# Patient Record
Sex: FEMALE | Race: WHITE | Employment: OTHER | URBAN - METROPOLITAN AREA
[De-identification: names, ages, dates, MRNs, and addresses within clinical notes are randomized per-mention and may not be internally consistent; named-entity substitution may affect disease eponyms.]

---

## 2020-07-19 ENCOUNTER — HOSPITAL ENCOUNTER (INPATIENT)
Age: 85
LOS: 2 days | Discharge: HOME HEALTH | DRG: 291 | End: 2020-07-24
Attending: EMERGENCY MEDICINE | Admitting: HOSPITALIST
Payer: MEDICARE

## 2020-07-19 ENCOUNTER — APPOINTMENT (OUTPATIENT)
Dept: GENERAL RADIOLOGY | Age: 85
DRG: 291 | End: 2020-07-19
Attending: EMERGENCY MEDICINE
Payer: MEDICARE

## 2020-07-19 DIAGNOSIS — I50.21 ACUTE SYSTOLIC CONGESTIVE HEART FAILURE (HCC): Primary | ICD-10-CM

## 2020-07-19 PROBLEM — I50.9 CHF (CONGESTIVE HEART FAILURE) (HCC): Status: ACTIVE | Noted: 2020-07-19

## 2020-07-19 LAB
ALBUMIN SERPL-MCNC: 3.2 G/DL (ref 3.5–5)
ALBUMIN/GLOB SERPL: 0.9 {RATIO} (ref 1.1–2.2)
ALP SERPL-CCNC: 53 U/L (ref 45–117)
ALT SERPL-CCNC: 30 U/L (ref 12–78)
ANION GAP SERPL CALC-SCNC: 7 MMOL/L (ref 5–15)
APPEARANCE UR: CLEAR
APTT PPP: 25.2 SEC (ref 22.1–32)
AST SERPL-CCNC: 31 U/L (ref 15–37)
BACTERIA URNS QL MICRO: NEGATIVE /HPF
BASOPHILS # BLD: 0.1 K/UL (ref 0–0.1)
BASOPHILS NFR BLD: 1 % (ref 0–1)
BILIRUB SERPL-MCNC: 0.7 MG/DL (ref 0.2–1)
BILIRUB UR QL: NEGATIVE
BNP SERPL-MCNC: 2170 PG/ML (ref 0–450)
BUN SERPL-MCNC: 11 MG/DL (ref 6–20)
BUN/CREAT SERPL: 11 (ref 12–20)
CALCIUM SERPL-MCNC: 8.9 MG/DL (ref 8.5–10.1)
CHLORIDE SERPL-SCNC: 101 MMOL/L (ref 97–108)
CO2 SERPL-SCNC: 33 MMOL/L (ref 21–32)
COLOR UR: ABNORMAL
CREAT SERPL-MCNC: 0.98 MG/DL (ref 0.55–1.02)
DIFFERENTIAL METHOD BLD: ABNORMAL
EOSINOPHIL # BLD: 0.5 K/UL (ref 0–0.4)
EOSINOPHIL NFR BLD: 6 % (ref 0–7)
EPITH CASTS URNS QL MICRO: ABNORMAL /LPF
ERYTHROCYTE [DISTWIDTH] IN BLOOD BY AUTOMATED COUNT: 13.5 % (ref 11.5–14.5)
GLOBULIN SER CALC-MCNC: 3.5 G/DL (ref 2–4)
GLUCOSE SERPL-MCNC: 126 MG/DL (ref 65–100)
GLUCOSE UR STRIP.AUTO-MCNC: NEGATIVE MG/DL
HCT VFR BLD AUTO: 37.8 % (ref 35–47)
HGB BLD-MCNC: 12.1 G/DL (ref 11.5–16)
HGB UR QL STRIP: NEGATIVE
IMM GRANULOCYTES # BLD AUTO: 0 K/UL (ref 0–0.04)
IMM GRANULOCYTES NFR BLD AUTO: 0 % (ref 0–0.5)
INR PPP: 1.3 (ref 0.9–1.1)
KETONES UR QL STRIP.AUTO: NEGATIVE MG/DL
LEUKOCYTE ESTERASE UR QL STRIP.AUTO: ABNORMAL
LYMPHOCYTES # BLD: 3 K/UL (ref 0.8–3.5)
LYMPHOCYTES NFR BLD: 36 % (ref 12–49)
MCH RBC QN AUTO: 30.1 PG (ref 26–34)
MCHC RBC AUTO-ENTMCNC: 32 G/DL (ref 30–36.5)
MCV RBC AUTO: 94 FL (ref 80–99)
MONOCYTES # BLD: 0.9 K/UL (ref 0–1)
MONOCYTES NFR BLD: 11 % (ref 5–13)
NEUTS SEG # BLD: 3.8 K/UL (ref 1.8–8)
NEUTS SEG NFR BLD: 46 % (ref 32–75)
NITRITE UR QL STRIP.AUTO: NEGATIVE
NRBC # BLD: 0 K/UL (ref 0–0.01)
NRBC BLD-RTO: 0 PER 100 WBC
PH UR STRIP: 6 [PH] (ref 5–8)
PLATELET # BLD AUTO: 180 K/UL (ref 150–400)
PMV BLD AUTO: 11.6 FL (ref 8.9–12.9)
POTASSIUM SERPL-SCNC: 3.3 MMOL/L (ref 3.5–5.1)
PROT SERPL-MCNC: 6.7 G/DL (ref 6.4–8.2)
PROT UR STRIP-MCNC: NEGATIVE MG/DL
PROTHROMBIN TIME: 12.6 SEC (ref 9–11.1)
RBC # BLD AUTO: 4.02 M/UL (ref 3.8–5.2)
RBC #/AREA URNS HPF: ABNORMAL /HPF (ref 0–5)
RBC MORPH BLD: ABNORMAL
SODIUM SERPL-SCNC: 141 MMOL/L (ref 136–145)
SP GR UR REFRACTOMETRY: 1.01 (ref 1–1.03)
THERAPEUTIC RANGE,PTTT: NORMAL SECS (ref 58–77)
TROPONIN I SERPL-MCNC: <0.05 NG/ML
UA: UC IF INDICATED,UAUC: ABNORMAL
UROBILINOGEN UR QL STRIP.AUTO: 1 EU/DL (ref 0.2–1)
WBC # BLD AUTO: 8.3 K/UL (ref 3.6–11)
WBC URNS QL MICRO: ABNORMAL /HPF (ref 0–4)

## 2020-07-19 PROCEDURE — 85730 THROMBOPLASTIN TIME PARTIAL: CPT

## 2020-07-19 PROCEDURE — 83880 ASSAY OF NATRIURETIC PEPTIDE: CPT

## 2020-07-19 PROCEDURE — 84484 ASSAY OF TROPONIN QUANT: CPT

## 2020-07-19 PROCEDURE — 99218 HC RM OBSERVATION: CPT

## 2020-07-19 PROCEDURE — 74011250636 HC RX REV CODE- 250/636: Performed by: EMERGENCY MEDICINE

## 2020-07-19 PROCEDURE — 87635 SARS-COV-2 COVID-19 AMP PRB: CPT

## 2020-07-19 PROCEDURE — 80053 COMPREHEN METABOLIC PANEL: CPT

## 2020-07-19 PROCEDURE — 85025 COMPLETE CBC W/AUTO DIFF WBC: CPT

## 2020-07-19 PROCEDURE — 93005 ELECTROCARDIOGRAM TRACING: CPT

## 2020-07-19 PROCEDURE — 85610 PROTHROMBIN TIME: CPT

## 2020-07-19 PROCEDURE — 71045 X-RAY EXAM CHEST 1 VIEW: CPT

## 2020-07-19 PROCEDURE — 96374 THER/PROPH/DIAG INJ IV PUSH: CPT

## 2020-07-19 PROCEDURE — 36415 COLL VENOUS BLD VENIPUNCTURE: CPT

## 2020-07-19 PROCEDURE — 81001 URINALYSIS AUTO W/SCOPE: CPT

## 2020-07-19 PROCEDURE — 99285 EMERGENCY DEPT VISIT HI MDM: CPT

## 2020-07-19 RX ORDER — FUROSEMIDE 10 MG/ML
40 INJECTION INTRAMUSCULAR; INTRAVENOUS
Status: COMPLETED | OUTPATIENT
Start: 2020-07-19 | End: 2020-07-19

## 2020-07-19 RX ORDER — FUROSEMIDE 40 MG/1
20 TABLET ORAL DAILY
COMMUNITY
Start: 2020-08-01 | End: 2020-08-17

## 2020-07-19 RX ORDER — IRBESARTAN 300 MG/1
150 TABLET ORAL DAILY
COMMUNITY
Start: 2020-08-01

## 2020-07-19 RX ORDER — CARVEDILOL 12.5 MG/1
3.24 TABLET ORAL 2 TIMES DAILY
COMMUNITY
Start: 2020-08-01

## 2020-07-19 RX ADMIN — FUROSEMIDE 40 MG: 10 INJECTION, SOLUTION INTRAMUSCULAR; INTRAVENOUS at 21:00

## 2020-07-19 NOTE — ED NOTES
Patient assisted up to bedside commode. Patient became dyspneic when getting back in bed spO2 88-90%. Patient reminded to try and take deep breaths. SpO2 keke to 94% within a minute of being back in bed. Daughter is concerned. MD notified.

## 2020-07-19 NOTE — ED PROVIDER NOTES
HPI   The patient is a 71-year-old white female who had been in independent living facility in [place] which had no [place] cases and was picked up by her daughter 5 days ago and driven back from [place] to live with her daughter. Since her daughter got her she seems to be not as strong as usual according to her. She had been admitted 2 months ago to the hospital when she fell and was found to have an infiltrate on chest x-ray. She has a mild cough but no significant production. She has a history of atrial fibrillation and congestive heart failure hypertension and a TIA in the past.  She has no new focal neurologic deficits. She was checked twice for COVID and was negative in [place]. She recently had her Lasix doubled by her PCP. Past Medical History:   Diagnosis Date    A-fib [facility])     CHF (congestive heart failure) (HCC)     EF of 55-60%    Hypertension     TIA (transient ischemic attack)        Past Surgical History:   Procedure Laterality Date    HX CATARACT REMOVAL      HX TONSILLECTOMY           History reviewed. No pertinent family history.     Social History     Socioeconomic History    Marital status: Not on file     Spouse name: Not on file    Number of children: Not on file    Years of education: Not on file    Highest education level: Not on file   Occupational History    Not on file   Social Needs    Financial resource strain: Not on file    Food insecurity     Worry: Not on file     Inability: Not on file    Transportation needs     Medical: Not on file     Non-medical: Not on file   Tobacco Use    Smoking status: Former Smoker     Years: 30.00    Smokeless tobacco: Never Used   Substance and Sexual Activity    Alcohol use: Not Currently    Drug use: Not on file    Sexual activity: Not on file   Lifestyle    Physical activity     Days per week: Not on file     Minutes per session: Not on file    Stress: Not on file   Relationships    Social connections     Talks on phone: Not on file     Gets together: Not on file     Attends Scientologist service: Not on file     Active member of club or organization: Not on file     Attends meetings of clubs or organizations: Not on file     Relationship status: Not on file    Intimate partner violence     Fear of current or ex partner: Not on file     Emotionally abused: Not on file     Physically abused: Not on file     Forced sexual activity: Not on file   Other Topics Concern    Not on file   Social History Narrative    Not on file         ALLERGIES: Pcn [penicillins]    Review of Systems   All other systems reviewed and are negative. Vitals:    07/19/20 1741   BP: (!) 149/92   Pulse: 82   Resp: 21   Temp: 98.5 °F (36.9 °C)   SpO2: 92%   Weight: 63.6 kg (140 lb 3.4 oz)   Height: 5' 3\" (1.6 m)            Physical Exam  Vitals signs and nursing note reviewed. Constitutional:       Appearance: She is well-developed. Comments: Elderly chronically ill debilitated rales bilateral in the bases   HENT:      Head: Normocephalic and atraumatic. Mouth/Throat:      Pharynx: No oropharyngeal exudate. Eyes:      General: No scleral icterus. Conjunctiva/sclera: Conjunctivae normal.   Neck:      Musculoskeletal: Neck supple. Thyroid: No thyromegaly. Cardiovascular:      Rate and Rhythm: Normal rate and regular rhythm. Heart sounds: Normal heart sounds. No murmur. No friction rub. No gallop. Pulmonary:      Effort: Pulmonary effort is normal. No respiratory distress. Breath sounds: Normal breath sounds. No stridor. No wheezing or rales. Abdominal:      General: Bowel sounds are normal.      Palpations: Abdomen is soft. Tenderness: There is no abdominal tenderness. There is no guarding or rebound. Musculoskeletal: Normal range of motion. Lymphadenopathy:      Cervical: No cervical adenopathy. Skin:     General: Skin is warm and dry.    Neurological:      Mental Status: She is alert and oriented to person, place, and time.          MDM       Procedures

## 2020-07-19 NOTE — ED TRIAGE NOTES
Triage Note: Patient arrives to ER complaining of lightheadedness and nausea. Recently had pneumonia 2 months ago. On Friday she was rediagnosed with pneumonia again and started on z-pack. Daughter states she has been more tired since Friday, worse in the morning. Daughter states she had an episode of low BP this morning. She is concern that the patient need IV antibiotics. Denies fever. +productive cough. Patient just arrived from [place] Wednesday. no

## 2020-07-20 LAB
ANION GAP SERPL CALC-SCNC: 7 MMOL/L (ref 5–15)
ATRIAL RATE: 78 BPM
BASOPHILS # BLD: 0.1 K/UL (ref 0–0.1)
BASOPHILS NFR BLD: 1 % (ref 0–1)
BNP SERPL-MCNC: 2120 PG/ML
BUN SERPL-MCNC: 9 MG/DL (ref 6–20)
BUN/CREAT SERPL: 14 (ref 12–20)
CALCIUM SERPL-MCNC: 8.5 MG/DL (ref 8.5–10.1)
CALCULATED R AXIS, ECG10: 78 DEGREES
CALCULATED T AXIS, ECG11: 79 DEGREES
CHLORIDE SERPL-SCNC: 100 MMOL/L (ref 97–108)
CO2 SERPL-SCNC: 33 MMOL/L (ref 21–32)
CREAT SERPL-MCNC: 0.64 MG/DL (ref 0.55–1.02)
DIAGNOSIS, 93000: NORMAL
DIFFERENTIAL METHOD BLD: ABNORMAL
EOSINOPHIL # BLD: 0.6 K/UL (ref 0–0.4)
EOSINOPHIL NFR BLD: 7 % (ref 0–7)
ERYTHROCYTE [DISTWIDTH] IN BLOOD BY AUTOMATED COUNT: 14 % (ref 11.5–14.5)
GLUCOSE SERPL-MCNC: 93 MG/DL (ref 65–100)
HCT VFR BLD AUTO: 40.7 % (ref 35–47)
HGB BLD-MCNC: 12.3 G/DL (ref 11.5–16)
IMM GRANULOCYTES # BLD AUTO: 0 K/UL (ref 0–0.04)
IMM GRANULOCYTES NFR BLD AUTO: 0 % (ref 0–0.5)
LYMPHOCYTES # BLD: 3 K/UL (ref 0.8–3.5)
LYMPHOCYTES NFR BLD: 36 % (ref 12–49)
MAGNESIUM SERPL-MCNC: 2 MG/DL (ref 1.6–2.4)
MCH RBC QN AUTO: 30.5 PG (ref 26–34)
MCHC RBC AUTO-ENTMCNC: 30.2 G/DL (ref 30–36.5)
MCV RBC AUTO: 101 FL (ref 80–99)
MONOCYTES # BLD: 0.9 K/UL (ref 0–1)
MONOCYTES NFR BLD: 11 % (ref 5–13)
NEUTS SEG # BLD: 3.7 K/UL (ref 1.8–8)
NEUTS SEG NFR BLD: 45 % (ref 32–75)
NRBC # BLD: 0 K/UL (ref 0–0.01)
NRBC BLD-RTO: 0 PER 100 WBC
PLATELET # BLD AUTO: 147 K/UL (ref 150–400)
PMV BLD AUTO: 12.8 FL (ref 8.9–12.9)
POTASSIUM SERPL-SCNC: 3.2 MMOL/L (ref 3.5–5.1)
Q-T INTERVAL, ECG07: 440 MS
QRS DURATION, ECG06: 82 MS
QTC CALCULATION (BEZET), ECG08: 478 MS
RBC # BLD AUTO: 4.03 M/UL (ref 3.8–5.2)
RBC MORPH BLD: ABNORMAL
SARS-COV-2, COV2: NOT DETECTED
SODIUM SERPL-SCNC: 140 MMOL/L (ref 136–145)
SOURCE, COVRS: NORMAL
SPECIMEN SOURCE, FCOV2M: NORMAL
VENTRICULAR RATE, ECG03: 71 BPM
WBC # BLD AUTO: 8.3 K/UL (ref 3.6–11)

## 2020-07-20 PROCEDURE — 97530 THERAPEUTIC ACTIVITIES: CPT

## 2020-07-20 PROCEDURE — 74011250637 HC RX REV CODE- 250/637: Performed by: INTERNAL MEDICINE

## 2020-07-20 PROCEDURE — 85025 COMPLETE CBC W/AUTO DIFF WBC: CPT

## 2020-07-20 PROCEDURE — 97535 SELF CARE MNGMENT TRAINING: CPT

## 2020-07-20 PROCEDURE — 99218 HC RM OBSERVATION: CPT

## 2020-07-20 PROCEDURE — 74011250637 HC RX REV CODE- 250/637: Performed by: HOSPITALIST

## 2020-07-20 PROCEDURE — 74011250636 HC RX REV CODE- 250/636: Performed by: INTERNAL MEDICINE

## 2020-07-20 PROCEDURE — 97116 GAIT TRAINING THERAPY: CPT

## 2020-07-20 PROCEDURE — 77010033678 HC OXYGEN DAILY

## 2020-07-20 PROCEDURE — 96376 TX/PRO/DX INJ SAME DRUG ADON: CPT

## 2020-07-20 PROCEDURE — 94760 N-INVAS EAR/PLS OXIMETRY 1: CPT

## 2020-07-20 PROCEDURE — 97166 OT EVAL MOD COMPLEX 45 MIN: CPT

## 2020-07-20 PROCEDURE — 97161 PT EVAL LOW COMPLEX 20 MIN: CPT

## 2020-07-20 PROCEDURE — 83880 ASSAY OF NATRIURETIC PEPTIDE: CPT

## 2020-07-20 PROCEDURE — 36415 COLL VENOUS BLD VENIPUNCTURE: CPT

## 2020-07-20 PROCEDURE — 83735 ASSAY OF MAGNESIUM: CPT

## 2020-07-20 PROCEDURE — 80048 BASIC METABOLIC PNL TOTAL CA: CPT

## 2020-07-20 RX ORDER — CARVEDILOL 12.5 MG/1
12.5 TABLET ORAL 2 TIMES DAILY
Status: DISCONTINUED | OUTPATIENT
Start: 2020-07-20 | End: 2020-07-24 | Stop reason: HOSPADM

## 2020-07-20 RX ORDER — POTASSIUM CHLORIDE 750 MG/1
40 TABLET, FILM COATED, EXTENDED RELEASE ORAL DAILY
Status: COMPLETED | OUTPATIENT
Start: 2020-07-20 | End: 2020-07-22

## 2020-07-20 RX ORDER — LOSARTAN POTASSIUM 50 MG/1
100 TABLET ORAL DAILY
Status: DISCONTINUED | OUTPATIENT
Start: 2020-07-20 | End: 2020-07-24 | Stop reason: HOSPADM

## 2020-07-20 RX ORDER — FUROSEMIDE 10 MG/ML
40 INJECTION INTRAMUSCULAR; INTRAVENOUS ONCE
Status: COMPLETED | OUTPATIENT
Start: 2020-07-20 | End: 2020-07-20

## 2020-07-20 RX ORDER — FUROSEMIDE 10 MG/ML
40 INJECTION INTRAMUSCULAR; INTRAVENOUS 2 TIMES DAILY
Status: DISCONTINUED | OUTPATIENT
Start: 2020-07-20 | End: 2020-07-21

## 2020-07-20 RX ORDER — HYDRALAZINE HYDROCHLORIDE 20 MG/ML
10 INJECTION INTRAMUSCULAR; INTRAVENOUS
Status: DISCONTINUED | OUTPATIENT
Start: 2020-07-20 | End: 2020-07-24 | Stop reason: HOSPADM

## 2020-07-20 RX ORDER — SPIRONOLACTONE 25 MG/1
25 TABLET ORAL DAILY
Status: DISCONTINUED | OUTPATIENT
Start: 2020-07-20 | End: 2020-07-24 | Stop reason: HOSPADM

## 2020-07-20 RX ORDER — POTASSIUM CHLORIDE 750 MG/1
40 TABLET, FILM COATED, EXTENDED RELEASE ORAL
Status: COMPLETED | OUTPATIENT
Start: 2020-07-20 | End: 2020-07-20

## 2020-07-20 RX ADMIN — POTASSIUM CHLORIDE 40 MEQ: 750 TABLET, FILM COATED, EXTENDED RELEASE ORAL at 06:00

## 2020-07-20 RX ADMIN — CARVEDILOL 12.5 MG: 12.5 TABLET, FILM COATED ORAL at 09:00

## 2020-07-20 RX ADMIN — LOSARTAN POTASSIUM 100 MG: 50 TABLET, FILM COATED ORAL at 09:00

## 2020-07-20 RX ADMIN — POTASSIUM CHLORIDE 40 MEQ: 750 TABLET, FILM COATED, EXTENDED RELEASE ORAL at 13:23

## 2020-07-20 RX ADMIN — CARVEDILOL 12.5 MG: 12.5 TABLET, FILM COATED ORAL at 17:07

## 2020-07-20 RX ADMIN — FUROSEMIDE 40 MG: 10 INJECTION, SOLUTION INTRAMUSCULAR; INTRAVENOUS at 17:07

## 2020-07-20 RX ADMIN — RIVAROXABAN 15 MG: 15 TABLET, FILM COATED ORAL at 09:00

## 2020-07-20 RX ADMIN — SPIRONOLACTONE 25 MG: 25 TABLET ORAL at 10:00

## 2020-07-20 RX ADMIN — FUROSEMIDE 40 MG: 10 INJECTION, SOLUTION INTRAMUSCULAR; INTRAVENOUS at 09:00

## 2020-07-20 NOTE — CARDIO/PULMONARY
Cardiac Rehab: Chart review for Michelle Silva based on Cardiac Rehab consult. Michelle Silva with history of DHF and significant dementia. DHF not covered by insurance for OP program and dementia would be an issue with safety concerns as well. Deanne Castillo RN

## 2020-07-20 NOTE — H&P
[address] [facility] Adult  Hospitalist Group  History and Physical    Primary Care Provider: None  Date of Service:  7/20/2020    Subjective:     Tabitha Patrick is a 80 y.o. female with atrial fibrillation, congestive heart failure, hypertension presented emergency room for evaluation of shortness of breath. Patient has significant dementia and is a poor historian unable to provide any history. She denies any complaints at this time. History is provided by her daughter over the phone. Daughter states that several weeks ago patient fell and went to the ER in [place]. At that time she was thought that she had a pneumonia and was sent home with antibiotic therapy. Despite compliance with medication patient still was feeling short of breath and noted to be dyspneic on exertion. She was seen by her primary care who recommended to increase her dose of Lasix from 20-40 as for suspected to be mostly secondary to CHF. Patient was also seen by her cardiologist at [place] about a week ago also recommended to increase the dose of carvedilol but patient has not done it. Family decided to bring the patient from [place] to [place] last Sunday as they feel that she was not getting appropriate care at the independent facility she was she was living. Daughter felt like in the last few days patient has been more short of breath having shallow breathing and this on exertion. Patient has not complained of any chest pain and she has not noted any fever. Daughter has been checking the patient's blood pressures at home which as per report has been noted in the morning but mostly elevated in the afternoon around the 432 systolic for which she has not increased the dose of Coreg. In the emergency room today patient had a chest x-ray that was suggestive of CHF/pulmonary edema. Also she had an elevated proBNP of 2000. She was given a dose of Lasix 40 mg IV. Patient resting comfortable at this time. Still requiring 2 L of oxygen. She does not use oxygen at home. Review of Systems:    Review of systems not obtained due to patient factors. Past Medical History:   Diagnosis Date    A-fib [facility])     CHF (congestive heart failure) (HCC)     EF of 55-60%    Hypertension     TIA (transient ischemic attack)       Past Surgical History:   Procedure Laterality Date    HX CATARACT REMOVAL      HX TONSILLECTOMY       Prior to Admission medications    Medication Sig Start Date End Date Taking? Authorizing Provider   irbesartan (AVAPRO) 300 mg tablet Take 300 mg by mouth daily. Yes Other, MD Sharon   carvediloL (COREG) 12.5 mg tablet Take 12.5 mg by mouth two (2) times a day. Yes Other, MD Sharon   rivaroxaban (Xarelto) 15 mg tab tablet Take 15 mg by mouth daily. Yes Dixie, MD Sharon   furosemide (Lasix) 40 mg tablet Take 40 mg by mouth daily. Yes Other, MD Sharon     Allergies   Allergen Reactions    Pcn [Penicillins] Hives     In childhood        History reviewed. No pertinent family history. SOCIAL HISTORY:  Patient resides at   Patient ambulates with walker  Smoking history: never  Alcohol history: none        Objective:       Physical Exam:   Patient Vitals for the past 12 hrs:   Temp Pulse Resp BP SpO2   07/20/20 0055 97.9 °F (36.6 °C) 80 24 174/78 100 %   07/19/20 2350 97.7 °F (36.5 °C) 79 24 135/74 98 %   07/19/20 2300 98.2 °F (36.8 °C) 81 24 125/68 95 %   07/19/20 2145  88 20 (!) 137/91 98 %   07/19/20 2121     98 %   07/19/20 2100  81 23 (!) 144/95 93 %   07/19/20 2015  85 20 (!) 156/92 95 %   07/19/20 1930  81 23 150/76 93 %   07/19/20 1845  76 23 142/84 93 %   07/19/20 1800  72 27 134/72 94 %   07/19/20 1741 98.5 °F (36.9 °C) 82 21 (!) 149/92 92 %     GEN APPEARANCE: Patient resting in bed in NAD  HEENT: Conjunctiva Clear  CVS: Irregular  LUNGS: Diffuse crackles. ABD: Soft; No TTP; + Normoactive BS  EXT: 1+ pitting edema.   Skin exam: No gross lesions noted on exposed skin surfaces  MENTAL STATUS: Oriented only to person. Not oriented to place or situation. responds to commands. NEURO:  No gross motor or sensory deficits      Data Review:   Recent Results (from the past 24 hour(s))   CBC WITH AUTOMATED DIFF    Collection Time: 07/19/20  5:57 PM   Result Value Ref Range    WBC 8.3 3.6 - 11.0 K/uL    RBC 4.02 3.80 - 5.20 M/uL    HGB 12.1 11.5 - 16.0 g/dL    HCT 37.8 35.0 - 47.0 %    MCV 94.0 80.0 - 99.0 FL    MCH 30.1 26.0 - 34.0 PG    MCHC 32.0 30.0 - 36.5 g/dL    RDW 13.5 11.5 - 14.5 %    PLATELET 958 671 - 500 K/uL    MPV 11.6 8.9 - 12.9 FL    NRBC 0.0 0  WBC    ABSOLUTE NRBC 0.00 0.00 - 0.01 K/uL    NEUTROPHILS 46 32 - 75 %    LYMPHOCYTES 36 12 - 49 %    MONOCYTES 11 5 - 13 %    EOSINOPHILS 6 0 - 7 %    BASOPHILS 1 0 - 1 %    IMMATURE GRANULOCYTES 0 0.0 - 0.5 %    ABS. NEUTROPHILS 3.8 1.8 - 8.0 K/UL    ABS. LYMPHOCYTES 3.0 0.8 - 3.5 K/UL    ABS. MONOCYTES 0.9 0.0 - 1.0 K/UL    ABS. EOSINOPHILS 0.5 (H) 0.0 - 0.4 K/UL    ABS. BASOPHILS 0.1 0.0 - 0.1 K/UL    ABS. IMM. GRANS. 0.0 0.00 - 0.04 K/UL    DF SMEAR SCANNED      RBC COMMENTS NORMOCYTIC, NORMOCHROMIC     METABOLIC PANEL, COMPREHENSIVE    Collection Time: 07/19/20  5:57 PM   Result Value Ref Range    Sodium 141 136 - 145 mmol/L    Potassium 3.3 (L) 3.5 - 5.1 mmol/L    Chloride 101 97 - 108 mmol/L    CO2 33 (H) 21 - 32 mmol/L    Anion gap 7 5 - 15 mmol/L    Glucose 126 (H) 65 - 100 mg/dL    BUN 11 6 - 20 MG/DL    Creatinine 0.98 0.55 - 1.02 MG/DL    BUN/Creatinine ratio 11 (L) 12 - 20      GFR est AA >60 >60 ml/min/1.73m2    GFR est non-AA 53 (L) >60 ml/min/1.73m2    Calcium 8.9 8.5 - 10.1 MG/DL    Bilirubin, total 0.7 0.2 - 1.0 MG/DL    ALT (SGPT) 30 12 - 78 U/L    AST (SGOT) 31 15 - 37 U/L    Alk.  phosphatase 53 45 - 117 U/L    Protein, total 6.7 6.4 - 8.2 g/dL    Albumin 3.2 (L) 3.5 - 5.0 g/dL    Globulin 3.5 2.0 - 4.0 g/dL    A-G Ratio 0.9 (L) 1.1 - 2.2     NT-PRO BNP    Collection Time: 07/19/20  5:57 PM   Result Value Ref Range    NT pro-BNP 2,170 (H) 0 - 450 PG/ML   PROTHROMBIN TIME + INR    Collection Time: 07/19/20  5:57 PM   Result Value Ref Range    INR 1.3 (H) 0.9 - 1.1      Prothrombin time 12.6 (H) 9.0 - 11.1 sec   PTT    Collection Time: 07/19/20  5:57 PM   Result Value Ref Range    aPTT 25.2 22.1 - 32.0 sec    aPTT, therapeutic range     58.0 - 77.0 SECS   TROPONIN I    Collection Time: 07/19/20  5:57 PM   Result Value Ref Range    Troponin-I, Qt. <0.05 <0.05 ng/mL   URINALYSIS W/ REFLEX CULTURE    Collection Time: 07/19/20  6:59 PM    Specimen: Urine   Result Value Ref Range    Color YELLOW/STRAW      Appearance CLEAR CLEAR      Specific gravity 1.010 1.003 - 1.030      pH (UA) 6.0 5.0 - 8.0      Protein Negative NEG mg/dL    Glucose Negative NEG mg/dL    Ketone Negative NEG mg/dL    Bilirubin Negative NEG      Blood Negative NEG      Urobilinogen 1.0 0.2 - 1.0 EU/dL    Nitrites Negative NEG      Leukocyte Esterase MODERATE (A) NEG      WBC 0-4 0 - 4 /hpf    RBC 0-5 0 - 5 /hpf    Epithelial cells MODERATE (A) FEW /lpf    Bacteria Negative NEG /hpf    UA:UC IF INDICATED CULTURE NOT INDICATED BY UA RESULT     SARS-COV-2    Collection Time: 07/19/20  8:55 PM   Result Value Ref Range    Specimen source Nasopharyngeal      SARS-CoV-2 PENDING     SARS-CoV-2 PENDING     Specimen source Nasopharyngeal      COVID-19 rapid test PENDING     COVID-19 PENDING NEG     Xr Chest Port    Result Date: 7/19/2020  IMPRESSION: Congestive failure with interstitial edema and small bilateral pleural effusions. Assessment:     Principal Problem:    CHF (congestive heart failure) ([facility] (7/19/2020)        Plan:     1. Acute hypoxic respiratory failure: H/o diastolic HF   - As per daughter report patient had an echocardiogram back in May 11, 2020 in [place] that showed ejection fraction of 60 to 65%. Patient has known diastolic dysfunction.  -We will hold on echocardiogram today and will try to obtain records.  Cardiology consult.    Repeat dose of Lasix 40 mg IV in the morning  Monitor renal function daily given diuresis  Strict in and outs   Daily weights  Wean off O2 as tolerated    2. Atrial fibrillation: Rate controlled. Will continue with Coreg. Patient on Xarelto. We will continue for now. 3. Hypertension: Elevated at this time. We will continue with home medication. 4.  Dementia: not on medications at home. DVT prophylaxis: Xarelto  CODE STATUS: Full code as per discussion with daughter  Surrogate decision maker is Onel Zhu (daughter and POA). Phone number [phone]. I have discussed case with daughter and updated her with treatment plan.       FUNCTIONAL STATUS PRIOR TO HOSPITALIZATION Ambulatory with Use of Assistive Devices    Signed By: Carolin Knutson MD     July 20, 2020

## 2020-07-20 NOTE — ROUTINE PROCESS
TRANSFER - OUT REPORT:    Verbal report given to Krzysztof Lyons RN (name) on Dede Martin  being transferred to [facility] (unit) for routine progression of care       Report consisted of patients Situation, Background, Assessment and   Recommendations(SBAR). Information from the following report(s) SBAR, ED Summary, Intake/Output, MAR and Recent Results was reviewed with the receiving nurse. Lines:   Peripheral IV 07/19/20 Right Hand (Active)   Site Assessment Clean, dry, & intact 07/19/20 1756   Phlebitis Assessment 0 07/19/20 1756   Infiltration Assessment 0 07/19/20 1756   Dressing Status Clean, dry, & intact 07/19/20 1756   Dressing Type Transparent 07/19/20 1756   Hub Color/Line Status Pink;Flushed;Patent 07/19/20 1756   Action Taken Blood drawn 07/19/20 1756        Opportunity for questions and clarification was provided. Patient transported with:   Monitor     UPDATE: Patient assessment unchanged, no changes to mental status while the ER, tolerating O2 well, VSS, no complaints at this time, waiting on transport to inpatient facility.      Visit Vitals  BP (!) 137/91 (BP 1 Location: Left arm, BP Patient Position: At rest)   Pulse 88   Temp 98.5 °F (36.9 °C)   Resp 20   Ht 5' 3\" (1.6 m)   Wt 63.6 kg (140 lb 3.4 oz)   SpO2 98%   BMI 24.84 kg/m²

## 2020-07-20 NOTE — PROGRESS NOTES
Bedside shift change report given to Nanci (oncoming nurse) by Shaniqua Tadeo (offgoing nurse). Report included the following information SBAR, Kardex, ED Summary, Intake/Output and Accordion. Request form for Medical records faxed to [facility].           Problem: Falls - Risk of  Goal: *Absence of Falls  Description: Document Rosey Sanchez Fall Risk and appropriate interventions in the flowsheet. Outcome: Progressing Towards Goal  Note: Fall Risk Interventions:  Mobility Interventions: Communicate number of staff needed for ambulation/transfer, Patient to call before getting OOB, PT Consult for assist device competence, PT Consult for mobility concerns         Medication Interventions: Evaluate medications/consider consulting pharmacy, Patient to call before getting OOB, Teach patient to arise slowly    Elimination Interventions: Call light in reach, Patient to call for help with toileting needs, Toileting schedule/hourly rounds    History of Falls Interventions: Door open when patient unattended, Evaluate medications/consider consulting pharmacy, Investigate reason for fall, Room close to nurse's station         Problem: Heart Failure: Day 2  Goal: Treatments/Interventions/Procedures  Outcome: Progressing Towards Goal      Diuresed on IV lasix. Strict I&0's. Educated on daily weights, low Na diet and I&O's.

## 2020-07-20 NOTE — CONSULTS
CARDIOLOGY CONSULT                  Subjective:    Date of  Admission: 7/19/2020  5:34 PM     Admission type:Emergency    River Guerra is a 80 y.o. female admitted for CHF (congestive heart failure) ([facility] [I50.9]. Has a known history of HFpEF, AF. She had been having worsening dyspnea, initially with exertion but then with rest. Recently had her lasix increased. She had also recently seen her cardiologist in [place] who had been making adjustments to meds. ED CXR showed pulmonary edema and labs showed an elevated BNP and a normal troponin. Received IV lasix and has had improvement. Patient Active Problem List    Diagnosis Date Noted    CHF (congestive heart failure) ([facility] 07/19/2020      None  Past Medical History:   Diagnosis Date    A-fib ([facility]     CH[facility]congestive [facility]) ([facility])     EF of 55-60%    Hypertension     TIA (transient ischemic attack)       Past Surgical History:   Procedure Laterality Date    HX CATARACT REMOVAL      HX TONSILLECTOMY       Allergies   Allergen Reactions    Pcn [Penicillins] Hives     In childhood        History reviewed. No pertinent family history.    Current Facility-Administered Medications   Medication Dose Route Frequency    carvediloL (COREG) tablet 12.5 mg  12.5 mg Oral BID    losartan (COZAAR) tablet 100 mg  100 mg Oral DAILY    hydrALAZINE (APRESOLINE) 20 mg/mL injection 10 mg  10 mg IntraVENous Q6H PRN    rivaroxaban (XARELTO) tablet 15 mg  15 mg Oral DAILY    furosemide (LASIX) injection 40 mg  40 mg IntraVENous BID    spironolactone (ALDACTONE) tablet 25 mg  25 mg Oral DAILY         Review of Symptoms:  Gen - no F/C/S  Eyes - no vision changes  ENT - no sore throat, rhinorrhea, otalgia  CV - no CP, no palpitations, no orthopnea, no PND, no NARENDRA  Resp no cough, no SOB/COREY  GI - no AP, no n/v/d/c   - no dysuria, no hematuria  MSK - no abnormal joint pains  Skin - no rashes  Neuro - no HA, no numbness, no weakness, no slurred speech  Psych - no change in mood         Physical Exam    Visit Vitals  /71   Pulse 94   Temp 97.8 °F (36.6 °C)   Resp 26   Ht 5' 3\" (1.6 m)   Wt 62.5 kg (137 lb 11.2 oz)   SpO2 94%   BMI 24.39 kg/m²     EXAM PERFORMED VIA INTOUCH DEVICE WITH ASSISTANCE OF RN STAFF    NAD  Skin warm and dry  Nl conjunctiva  Oropharynx without exudate. Neck supple  No resp distress  Abdomen soft and non tender  Pulses 2+ radials  +LLE  Neuro:  Grossly intact  Appropriate      Cardiographics    Telemetry: AFIB  ECG: atrial fibrillation    Labs:   Recent Results (from the past 24 hour(s))   EKG, 12 LEAD, INITIAL    Collection Time: 07/19/20  5:45 PM   Result Value Ref Range    Ventricular Rate 71 BPM    Atrial Rate 78 BPM    QRS Duration 82 ms    Q-T Interval 440 ms    QTC Calculation (Bezet) 478 ms    Calculated R Axis 78 degrees    Calculated T Axis 79 degrees    Diagnosis       Atrial fibrillation  Cannot rule out Anterior infarct , age undetermined  Abnormal ECG  No previous ECGs available  Confirmed by Nahid Kee MD, Danette Villalba ([id number]) on 7/20/2020 9:26:09 AM     CBC WITH AUTOMATED DIFF    Collection Time: 07/19/20  5:57 PM   Result Value Ref Range    WBC 8.3 3.6 - 11.0 K/uL    RBC 4.02 3.80 - 5.20 M/uL    HGB 12.1 11.5 - 16.0 g/dL    HCT 37.8 35.0 - 47.0 %    MCV 94.0 80.0 - 99.0 FL    MCH 30.1 26.0 - 34.0 PG    MCHC 32.0 30.0 - 36.5 g/dL    RDW 13.5 11.5 - 14.5 %    PLATELET 947 476 - 077 K/uL    MPV 11.6 8.9 - 12.9 FL    NRBC 0.0 0  WBC    ABSOLUTE NRBC 0.00 0.00 - 0.01 K/uL    NEUTROPHILS 46 32 - 75 %    LYMPHOCYTES 36 12 - 49 %    MONOCYTES 11 5 - 13 %    EOSINOPHILS 6 0 - 7 %    BASOPHILS 1 0 - 1 %    IMMATURE GRANULOCYTES 0 0.0 - 0.5 %    ABS. NEUTROPHILS 3.8 1.8 - 8.0 K/UL    ABS. LYMPHOCYTES 3.0 0.8 - 3.5 K/UL    ABS. MONOCYTES 0.9 0.0 - 1.0 K/UL    ABS. EOSINOPHILS 0.5 (H) 0.0 - 0.4 K/UL    ABS. BASOPHILS 0.1 0.0 - 0.1 K/UL    ABS. IMM.  GRANS. 0.0 0.00 - 0.04 K/UL    DF SMEAR SCANNED      RBC COMMENTS NORMOCYTIC, NORMOCHROMIC METABOLIC PANEL, COMPREHENSIVE    Collection Time: 07/19/20  5:57 PM   Result Value Ref Range    Sodium 141 136 - 145 mmol/L    Potassium 3.3 (L) 3.5 - 5.1 mmol/L    Chloride 101 97 - 108 mmol/L    CO2 33 (H) 21 - 32 mmol/L    Anion gap 7 5 - 15 mmol/L    Glucose 126 (H) 65 - 100 mg/dL    BUN 11 6 - 20 MG/DL    Creatinine 0.98 0.55 - 1.02 MG/DL    BUN/Creatinine ratio 11 (L) 12 - 20      GFR est AA >60 >60 ml/min/1.73m2    GFR est non-AA 53 (L) >60 ml/min/1.73m2    Calcium 8.9 8.5 - 10.1 MG/DL    Bilirubin, total 0.7 0.2 - 1.0 MG/DL    ALT (SGPT) 30 12 - 78 U/L    AST (SGOT) 31 15 - 37 U/L    Alk.  phosphatase 53 45 - 117 U/L    Protein, total 6.7 6.4 - 8.2 g/dL    Albumin 3.2 (L) 3.5 - 5.0 g/dL    Globulin 3.5 2.0 - 4.0 g/dL    A-G Ratio 0.9 (L) 1.1 - 2.2     NT-PRO BNP    Collection Time: 07/19/20  5:57 PM   Result Value Ref Range    NT pro-BNP 2,170 (H) 0 - 450 PG/ML   PROTHROMBIN TIME + INR    Collection Time: 07/19/20  5:57 PM   Result Value Ref Range    INR 1.3 (H) 0.9 - 1.1      Prothrombin time 12.6 (H) 9.0 - 11.1 sec   PTT    Collection Time: 07/19/20  5:57 PM   Result Value Ref Range    aPTT 25.2 22.1 - 32.0 sec    aPTT, therapeutic range     58.0 - 77.0 SECS   TROPONIN I    Collection Time: 07/19/20  5:57 PM   Result Value Ref Range    Troponin-I, Qt. <0.05 <0.05 ng/mL   URINALYSIS W/ REFLEX CULTURE    Collection Time: 07/19/20  6:59 PM    Specimen: Urine   Result Value Ref Range    Color YELLOW/STRAW      Appearance CLEAR CLEAR      Specific gravity 1.010 1.003 - 1.030      pH (UA) 6.0 5.0 - 8.0      Protein Negative NEG mg/dL    Glucose Negative NEG mg/dL    Ketone Negative NEG mg/dL    Bilirubin Negative NEG      Blood Negative NEG      Urobilinogen 1.0 0.2 - 1.0 EU/dL    Nitrites Negative NEG      Leukocyte Esterase MODERATE (A) NEG      WBC 0-4 0 - 4 /hpf    RBC 0-5 0 - 5 /hpf    Epithelial cells MODERATE (A) FEW /lpf    Bacteria Negative NEG /hpf    UA:UC IF INDICATED CULTURE NOT INDICATED BY UA RESULT     SARS-COV-2    Collection Time: 07/19/20  8:55 PM   Result Value Ref Range    Specimen source Nasopharyngeal      SARS-CoV-2 PENDING     Specimen source Nasopharyngeal     METABOLIC PANEL, BASIC    Collection Time: 07/20/20  6:16 AM   Result Value Ref Range    Sodium 140 136 - 145 mmol/L    Potassium 3.2 (L) 3.5 - 5.1 mmol/L    Chloride 100 97 - 108 mmol/L    CO2 33 (H) 21 - 32 mmol/L    Anion gap 7 5 - 15 mmol/L    Glucose 93 65 - 100 mg/dL    BUN 9 6 - 20 MG/DL    Creatinine 0.64 0.55 - 1.02 MG/DL    BUN/Creatinine ratio 14 12 - 20      GFR est AA >60 >60 ml/min/1.73m2    GFR est non-AA >60 >60 ml/min/1.73m2    Calcium 8.5 8.5 - 10.1 MG/DL   MAGNESIUM    Collection Time: 07/20/20  6:16 AM   Result Value Ref Range    Magnesium 2.0 1.6 - 2.4 mg/dL   NT-PRO BNP    Collection Time: 07/20/20  6:16 AM   Result Value Ref Range    NT pro-BNP 2,120 (H) <450 PG/ML        Assessment and Plan:     This is a 80 yof with acute on chronic diastolic CHF    Increased lasix to BID  Daily BMP  Cont other cardiac meds  Started spironolactone  On NOAC for AF  Had recent echo which showed normal LVEF, no need to repeat    Thank you for the consult, please call with questions     Assessment:

## 2020-07-20 NOTE — ED NOTES
ZARI present in department. Given EMTALA paperwork and facesheet. Patient transferred to transport stretcher without difficulty. No acute distress at time of departure.

## 2020-07-20 NOTE — ED NOTES
RN at bedside updating and explaining admission process to daughter. IV lasix given and purwick in place. Patient requires continuous redirection on purwick due to baseline confusion. RN noticed that spO2 ranging between 89-91% on room air while resting. RN placed patient on O2 2L NC, spO2 now 98%. MD notified. Patient and daughter deny and needs at this time. VSS on monitor x 3, call bell within reach will continue reassessing as needed.

## 2020-07-20 NOTE — PROGRESS NOTES
[facility] Adult  Hospitalist Group                                                                                          Hospitalist Progress Note  Ezio Paredes MD  Answering service: [phone] OR [phone] from in house phone        Date of Service:  2020  NAME:  Tera Gilman  :  1923  MRN:  [id number]    This documentation was facilitated by a Voice Recognition software and may contain inadvertent typographical errors. Admission Summary:   Patient was brought to the ED due to worsening dyspnea. She was diagnosed with pneumonia last Friday and is on antibiotics. She was also treated for presumed pneumonia several weeks ago in [place]. Patient lives in [place] was brought to live with daughter last week. Her significant medical history includes atrial fibrillation congestive heart failure. With her ongoing symptoms of dyspnea and shortness of breath, her doctors (PCP and cardiologist) were increasing her diuretic and carvedilol. Interval history / Subjective:   Ms. Carter Rollins is doing well. She is lying in the bed comfortably. Weaing oxygen via nasal canula 1-2 l/min. Assessment & Plan:    Acute hypoxic respiratory failure likely due to CHF, undetermined if systolic or diastolic, NYHA class IV  -Responded to diuretics, continue Lasix  - As per daughter report patient had an echocardiogram back in May 11, 2020 in [place] that showed ejection fraction of 60 to 65%. Patient has known diastolic dysfunction.  -If records are not available, we may need to obtain echocardiogram once COVID is ruled out  Monitor renal function daily given diuresis  Strict in and outs   Daily weights  Wean off O2 as tolerated  -Cardiology consult by admitting team  -SARS-CoV-2 result pending           Chronic atrial fibrillation: Rate controlled. -Continue beta-blocker carvedilol  -Continue anticoagulation with Xarelto.         Hypertension: Continue carvedilol     Dementia: not on medications at home.   Patient is alert and oriented. If SARS-CoV-2  test is negative, can transfer to non-COVID  telemetry unit     DVT prophylaxis: Xarelto  CODE STATUS: Full code as per discussion with daughter  Surrogate decision maker is Caprice Mims (daughter and POA). Phone number [phone]. I have discussed case with daughter and updated her with treatment plan. Disposition: To be determined based on clinical progress and therapy evaluation  Anticipated discharge: 2-3 days. Hospital Problems  Date Reviewed: 7/19/2020          Codes Class Noted POA    * (Principal) CHF (congestive heart failure) ([address] ICD-10-CM: I50.9  ICD-9-CM: 428.0  7/19/2020 Unknown                Review of Systems:   A comprehensive review of systems was negative except for that written in the HPI. Vital Signs:    Last 24hrs VS reviewed since prior progress note. Most recent are:  Visit Vitals  /63 (BP 1 Location: Left arm, BP Patient Position: At rest)   Pulse 84   Temp 97.8 °F (36.6 °C)   Resp 29   Ht 5' 3\" (1.6 m)   Wt 62.5 kg (137 lb 11.2 oz)   SpO2 92%   BMI 24.39 kg/m²         Intake/Output Summary (Last 24 hours) at 7/20/2020 0656  Last data filed at 7/19/2020 2314  Gross per 24 hour   Intake    Output 900 ml   Net -900 ml        Physical Examination:             Constitutional:  No acute distress, cooperative, pleasant    HEENT:  Atraumatic. Oral mucosa moist,. Non icteric sclera. No pallor. Resp:  Not in distress. On oxygen via nasal cannula. Basal crackles right greater than left   Chest Wall: No deformity   CV:  Regular rhythm, normal rate, no murmurs, gallops, rubs    GI:  Soft, non distended, non tender.  normoactive bowel sounds, no hepatosplenomegaly    :  No CVA or suprapubic tenderness    Musculoskeletal:  + Edema, warm, 2+ pulses throughout    Neurologic:  Mental status:AAOx3,   Cranial nerves II-XII : WNL  Motor exam:Moves all extremities symmetrically       Psych:  Good insight, Not anxious nor agitated. Data Review:    Review and/or order of clinical lab test  Review and/or order of tests in the radiology section of CPT  Review and/or order of tests in the medicine section of [facility]      Labs:     Recent Labs     07/19/20 1757   WBC 8.3   HGB 12.1   HCT 37.8        Recent Labs     07/19/20 1757      K 3.3*      CO2 33*   BUN 11   CREA 0.98   *   CA 8.9     Recent Labs     07/19/20 1757   ALT 30   AP 53   TBILI 0.7   TP 6.7   ALB 3.2*   GLOB 3.5     Recent Labs     07/19/20 1757   INR 1.3*   PTP 12.6*   APTT 25.2      No results for input(s): FE, TIBC, PSAT, FERR in the last 72 hours. No results found for: FOL, RBCF   No results for input(s): PH, PCO2, PO2 in the last 72 hours.   Recent Labs     07/19/20 1757   TROIQ <0.05     No results found for: CHOL, CHOLX, CHLST, CHOLV, HDL, HDLP, LDL, LDLC, DLDLP, TGLX, TRIGL, TRIGP, CHHD, CHHDX  No results found for: [facility]  Lab Results   Component Value Date/Time    Color YELLOW/STRAW 07/19/2020 06:59 PM    Appearance CLEAR 07/19/2020 06:59 PM    Specific gravity 1.010 07/19/2020 06:59 PM    pH (UA) 6.0 07/19/2020 06:59 PM    Protein Negative 07/19/2020 06:59 PM    Glucose Negative 07/19/2020 06:59 PM    Ketone Negative 07/19/2020 06:59 PM    Bilirubin Negative 07/19/2020 06:59 PM    Urobilinogen 1.0 07/19/2020 06:59 PM    Nitrites Negative 07/19/2020 06:59 PM    Leukocyte Esterase MODERATE (A) 07/19/2020 06:59 PM    Epithelial cells MODERATE (A) 07/19/2020 06:59 PM    Bacteria Negative 07/19/2020 06:59 PM    WBC 0-4 07/19/2020 06:59 PM    RBC 0-5 07/19/2020 06:59 PM         Medications Reviewed:     Current Facility-Administered Medications   Medication Dose Route Frequency    carvediloL (COREG) tablet 12.5 mg  12.5 mg Oral BID    losartan (COZAAR) tablet 100 mg  100 mg Oral DAILY    hydrALAZINE (APRESOLINE) 20 mg/mL injection 10 mg  10 mg IntraVENous Q6H PRN    furosemide (LASIX) injection 40 mg  40 mg IntraVENous ONCE ______________________________________________________________________  EXPECTED LENGTH OF STAY: - - -  ACTUAL LENGTH OF STAY:          0                 Rafat Last MD

## 2020-07-20 NOTE — ED NOTES
Daughter Annette Quintero [facility]) can be reached @ [phone]. She states she is available for calls at anytime.

## 2020-07-20 NOTE — PROGRESS NOTES
Problem: Mobility Impaired (Adult and Pediatric)  Goal: *Acute Goals and Plan of Care (Insert Text)  Description: FUNCTIONAL STATUS PRIOR TO ADMISSION: Pt lived at an independent living facility. She was independent with mobility and ADLs, used a RW as needed     HOME SUPPORT PRIOR TO ADMISSION: Pt lives alone in [place], does not drive, daughter lives in [place] and is involved in her care    Physical Therapy Goals  Initiated 7/20/2020  1. Patient will move from supine to sit and sit to supine , scoot up and down, and roll side to side in bed with independence within 7 day(s). 2.  Patient will transfer from bed to chair and chair to bed with modified independence using the least restrictive device within 7 day(s). 3.  Patient will perform sit to stand with modified independence within 7 day(s). 4.  Patient will ambulate with contact guard assist for 50 feet with the least restrictive device within 7 day(s). 5.  Patient will ascend/descend 4 stairs with single handrail(s) with minimal assistance/contact guard assist within 7 day(s). Outcome: Progressing Towards Goal  PHYSICAL THERAPY EVALUATION  Patient: Tera Gilman (63 y.o. female)  Date: 7/20/2020  Primary Diagnosis: [facility] [I50.9]        Precautions: Fall    ASSESSMENT  Based on the objective data described below, the patient presents with generalized weakness, dizziness, impaired balance, decreased endurance, and overall decline in functional mobility compared to baseline function. At baseline, pt lives alone at an independent living facility and was independent with mobility. This date, she transferred supine to sit with mod I, sit<>stand with Maurisio, and took a few steps bed to [facility] to chair with Maurisio and HHA x1. She demonstrated overall unsteadiness, heavy reliance on HHA, and posterior lean during transfer.   Pt performed additional sit<>stand transfers from chair with Maurisio and verbal cuing for sequencing/technique. Recommending HH PT and increased assist from family for mobility upon discharge. Current Level of Function Impacting Discharge (mobility/balance): Maurisio for transfers and short distance ambulation     Functional Outcome Measure: The patient scored 40/100 on the Barthel Index outcome measure which is indicative of 60% impairment and dependence on others for basic mobility and self care tasks. Other factors to consider for discharge: fall risk, lives alone     Patient will benefit from skilled therapy intervention to address the above noted impairments. PLAN :  Recommendations and Planned Interventions: bed mobility training, transfer training, gait training, therapeutic exercises, patient and family training/education, and therapeutic activities      Frequency/Duration: Patient will be followed by physical therapy:  4 times a week to address goals. Recommendation for discharge: (in order for the patient to meet his/her long term goals)  Physical therapy at least 2 days/week in the home AND ensure assist and/or supervision for safety with mobility and ADLs    This discharge recommendation:  Has not yet been discussed the attending provider and/or case management    IF patient discharges home will need the following DME: patient owns DME required for discharge         SUBJECTIVE:   Patient stated I'm just so happy to be out of bed.     OBJECTIVE DATA SUMMARY:   HISTORY:    Past Medical History:   Diagnosis Date    A-fib ([address]     CHF (congestive heart failure) (HCC)     EF of 55-60%    Hypertension     TIA (transient ischemic attack)      Past Surgical History:   Procedure Laterality Date    HX CATARACT REMOVAL      HX TONSILLECTOMY       Home Situation  Home Environment: Independent living  # Steps to Enter: 0  One/Two Story Residence: One story  Living Alone: Yes  Support Systems: Family member(s), Child(anabelle)  Patient Expects to be Discharged to[de-identified] Apartment  Current DME Used/Available at Home: Eliza Dueñas, rollator  Tub or Shower Type: Tub/Shower combination    EXAMINATION/PRESENTATION/DECISION MAKING:   Critical Behavior:  Neurologic State: Alert  Orientation Level: Oriented X4(forgeful sometimes)  Cognition: Follows commands  Safety/Judgement: Awareness of environment, Good awareness of safety precautions  Hearing: Auditory  Auditory Impairment: Hard of hearing, bilateral    Range Of Motion:  AROM: Within functional limits                       Strength:    Strength: Generally decreased, functional                    Tone & Sensation:   Tone: Normal              Sensation: Intact               Coordination:  Coordination: Within functional limits  Vision:      Functional Mobility:  Bed Mobility:     Supine to Sit: Modified independent     Scooting: Modified independent  Transfers:  Sit to Stand: Minimum assistance(VC for positioniong, hand placement, and technique)  Stand to Sit: Minimum assistance        Bed to Chair: Minimum assistance              Balance:   Sitting: Intact; Without support  Standing: Impaired; With support  Standing - Static: Fair;Constant support  Standing - Dynamic : Fair;Constant support  Ambulation/Gait Training:  Distance (ft): 3 Feet (ft)(x2 reps)  Assistive Device: (HHA)  Ambulation - Level of Assistance: Minimal assistance;Assist x1        Gait Abnormalities: Decreased step clearance;Shuffling gait;Trunk sway increased        Base of Support: Narrowed  Stance: Left decreased;Right decreased  Speed/Bethany: Slow;Shuffled  Step Length: Right shortened;Left shortened  Swing Pattern: Left asymmetrical;Right asymmetrical    Functional Measure:  Barthel Index:    Bathin  Bladder: 0  Bowels: 10  Groomin  Dressin  Feeding: 10  Mobility: 0  Stairs: 0  Toilet Use: 5  Transfer (Bed to Chair and Back): 10  Total: 40/100       The Barthel ADL Index: Guidelines  1.  The index should be used as a record of what a patient does, not as a record of what a patient could do. 2. The main aim is to establish degree of independence from any help, physical or verbal, however minor and for whatever reason. 3. The need for supervision renders the patient not independent. 4. A patient's performance should be established using the best available evidence. Asking the patient, friends/relatives and nurses are the usual sources, but direct observation and common sense are also important. However direct testing is not needed. 5. Usually the patient's performance over the preceding 24-48 hours is important, but occasionally longer periods will be relevant. 6. Middle categories imply that the patient supplies over 50 per cent of the effort. 7. Use of aids to be independent is allowed. Collin Bravo., Barthel, D.W. ([id number]). Functional evaluation: the Barthel Index. [facility] (14)2. Kevin Willams lilliam CANDY Avila, Terese Castleman., Nacho Landt., [place], [facility] (1999). Measuring the change indisability after inpatient rehabilitation; comparison of the responsiveness of the Barthel Index and Functional [place] Measure. Journal of Neurology, Neurosurgery, and Psychiatry, 66(4), 832-667. Maria A Lowry, N.J.A, AYO Perez.BARBI, & Abbey Devi, M.A. (2004.) Assessment of post-stroke quality of life in cost-effectiveness studies: The usefulness of the Barthel Index and the EuroQoL-5D.  Quality of Life Research, 15, 180-18           Physical Therapy Evaluation Charge Determination   History Examination Presentation Decision-Making   MEDIUM  Complexity : 1-2 comorbidities / personal factors will impact the outcome/ POC  HIGH Complexity : 4+ Standardized tests and measures addressing body structure, function, activity limitation and / or participation in recreation  LOW Complexity : Stable, uncomplicated  Other outcome measures Barthel  MEDIUM      Based on the above components, the patient evaluation is determined to be of the following complexity level: LOW     Activity Tolerance:   Fair, on 2L O2  Please refer to the flowsheet for vital signs taken during this treatment. After treatment patient left in no apparent distress:   Sitting in chair, Call bell within reach, and Bed / chair alarm activated    COMMUNICATION/EDUCATION:   The patients plan of care was discussed with: Occupational therapist and Registered nurse. Fall prevention education was provided and the patient/caregiver indicated understanding., Patient/family have participated as able in goal setting and plan of care. , and Patient/family agree to work toward stated goals and plan of care.     Thank you for this referral.  Flory Tubbs, PT, DPT   Time Calculation: 32 mins

## 2020-07-20 NOTE — PROGRESS NOTES
Problem: Self Care Deficits Care Plan (Adult)  Goal: *Acute Goals and Plan of Care (Insert Text)  Description:   FUNCTIONAL STATUS PRIOR TO ADMISSION: Patient was modified independent using a walker PRN for functional mobility in the apartment and RW mostly out of apartment. Patient was modified independent for basic ADLs sitting to bath and dress. Moderate A instrumental ADLs - completes her own grocery and medication delivery. Plays bridge - walks down to attend. Family is very supportive. Patient is from [place], visiting family in [place]. HOME SUPPORT: The patient lived alone with family to provide assistance PRN. Occupational Therapy Goals  Initiated 7/20/2020  1. Patient will perform lower body dressing with modified independence within 7 day(s). 2.  Patient will perform gathering ADLs waist height and above 4/4 with modified independence within 7 day(s). 3.  Patient will perform standing at sink to groom 2 mins with supervision/set-up within 7 day(s). 4.  Patient will perform toilet transfers with modified independence within 7 day(s). 5.  Patient will participate in upper extremity therapeutic exercise/activities with supervision/set-up within 7 day(s). 6.  Patient will utilize energy conservation techniques during functional activities with verbal cues within 7 day(s). Outcome: Not Met   OCCUPATIONAL THERAPY EVALUATION  Patient: Dede Martin (61 y.o. female)  Date: 7/20/2020  Primary Diagnosis: [facility] [I50.9]       Precautions: chair alarm       ASSESSMENT  Based on the objective data described below, the patient presents with decreased standing tolerance, pulmonary tolerance, decreased functional reach to lower body, cognition (complex processing, minimal STM loss) and anxiety. New onset, per patient, urinary incontinence. However, she is 80, out of her familiar environment.      Current Level of Function Impacting Discharge (ADLs/self-care): setup upper body ADLs, moderate A lower body ADLs. Other factors to consider for discharge: lives alone but has family to A PRN     Patient will benefit from skilled therapy intervention to address the above noted impairments. PLAN :  Recommendations and Planned Interventions: self care training, functional mobility training, therapeutic exercise, balance training, therapeutic activities, endurance activities, patient education, home safety training and family training/education  Frequency/Duration: Patient will be followed by occupational therapy 3 times a week to address goals. Recommend with staff: allow patient increased time to hear you [facility]), process information and respond cognitively and physically. Patient is able to complete ADLs and functional mobility with increased time, one person assist for stability only. BSC for urinary incontinence, try to wean PureWick. Recommendation for discharge: (in order for the patient to meet his/her long term goals) if discharges home today:   Occupational therapy at least 2 days/week in the home AND ensure assist and/or supervision for safety with functional mobility, basic ADLs, total A instrumental ADLs; assist with O2 line if discharges home with it. Patient is highly motivated to discharge to her home in [facility], she understands that she may need to discharge to local family's home first.     This discharge recommendation:  Has not yet been discussed the attending provider and/or case management    IF patient discharges home will need the following DME: AE: long handled bathing, AE: long handled dressing, and bedside commode       SUBJECTIVE:   Patient stated It feels so good to be out of the bed! It has been a whole day - 24 hours!     OBJECTIVE DATA SUMMARY:   HISTORY:   Past Medical History:   Diagnosis Date    A-fib ([address]     CH[facility]congestive [facility])     EF of 55-60%    Hypertension     TIA (transient ischemic attack)      Past Surgical History: Procedure Laterality Date    HX CATARACT REMOVAL      HX TONSILLECTOMY         Expanded or extensive additional review of patient history:     Home Situation  Home Environment: Independent living  # Steps to Enter: 0  One/Two Story Residence: One story  Living Alone: Yes  Support Systems: Family member(s)  Patient Expects to be Discharged to[de-identified] Apartment  Current DME Used/Available at Home: None  Tub or Shower Type: Tub/Shower combination    Hand dominance: Right    EXAMINATION OF PERFORMANCE DEFICITS:  Cognitive/Behavioral Status:  Neurologic State: Alert  Orientation Level: Oriented X4(forgeful sometimes)  Cognition: Follows commands  Perception: Appears intact  Perseveration: No perseveration noted  Safety/Judgement: Awareness of environment;Good awareness of safety precautions    Skin: PIV R slight blood    Edema: intact    Hearing: Auditory  Auditory Impairment: Hard of hearing, bilateral    Vision/Perceptual:                 Able to read badge and board without difficulty. Range of Motion:    AROM: Within functional limits                         Strength:    Strength: Generally decreased, functional                Coordination:  Coordination: Within functional limits  Fine Motor Skills-Upper: Right Intact; Left Intact    Gross Motor Skills-Upper: Left Intact; Right Intact    Tone & Sensation:    Tone: Normal  Sensation: Intact                      Balance:  Sitting: Intact; Without support  Standing: Impaired; Without support  Standing - Static: Fair  Standing - Dynamic : Fair    Functional Mobility and Transfers for ADLs:  Bed Mobility:  Supine to Sit: Modified independent  Scooting: Modified independent    Transfers:  Sit to Stand: Minimum assistance(EOB)  Stand to Sit: Minimum assistance(Chair)  Bed to Chair: Minimum assistance  Toilet Transfer : Minimum assistance(BSC)  Tub Transfer:  Total assistance(not safe at this time)    ADL Assessment:  Feeding: Independent nurse stating no assist    Oral Facial Hygiene/Grooming: Supervision - setup on beside tray, sitting    Bathing: Moderate assistance(A back, feet and standing balance) CHG bath    Upper Body Dressing: Supervision, setup    Lower Body Dressing: Moderate assistance(standing balance fair, reach to ankles) sitting EOB    Toileting: Moderate assistance(standing balance fair)                ADL Intervention and task modifications: Addressed call bell - able to process all items with increased time and then recall channel changing 50% of time. 100% of scenarios for nurse button use. Cognitive Retraining  Safety/Judgement: Awareness of environment;Good awareness of safety precautions    Therapeutic Exercise:     Functional Measure:      Occupational Therapy Evaluation Charge Determination   History Examination Decision-Making           Based on the above components, the patient evaluation is determined to be of the following complexity level:   Pain Rating:      Activity Tolerance:    requires rest breaks   Vitals:    07/20/20 1104 07/20/20 1433 07/20/20 1440 07/20/20 1453   BP: 102/43 101/42 162/84 125/62   Pulse: 77  81 83   Resp: 27  18 15   Temp: 97.8 °F (36.6 °C)  HOWEVER, PATIENT MOVING ARM AND ANXIOUS. SpO2: 94%  97% 94%   Weight:       Height:           Please refer to the flowsheet for vital signs taken during this treatment. After treatment patient left in no apparent distress:    Sitting in chair, Call bell within reach and Bed / chair alarm activated    COMMUNICATION/EDUCATION:   The patients plan of care was discussed with: Physical therapist and Registered nurse. Home safety education was provided and the patient/caregiver indicated understanding., Patient/family have participated as able in goal setting and plan of care. and Patient/family agree to work toward stated goals and plan of care. This patients plan of care is appropriate for delegation to [facility].     Thank you for this referral.  Lina Perry   Total time 30 mins

## 2020-07-20 NOTE — PROGRESS NOTES
KELLY:  1. MD monitoring renal function. 2. Wean O2 as needed. 3. Plan is to return home with daughter. 4. If HH needed, her family would like [facility]. Care Management Interventions  PCP Verified by CM: Yes  Palliative Care Criteria Met (RRAT>21 & CHF Dx)?: No  Transition of Care Consult (CM Consult): Other  MyChart Signup: No  Discharge Durable Medical Equipment: No  Health Maintenance Reviewed: Yes  Physical Therapy Consult: No  Occupational Therapy Consult: No  Speech Therapy Consult: No  Current Support Network: Relative's Home  Confirm Follow Up Transport: Family  The Plan for Transition of Care is Related to the Following Treatment Goals : Home with family assistance   The Patient and/or Patient Representative was Provided with a Choice of Provider and Agrees with the Discharge Plan?: Yes   Resource Information Provided?: No  Discharge Location  Discharge Placement: Unable to determine at this time    Reason for Admission:   CHF, covid test pending. RUR Score:          N/A           Plan for utilizing home health:      No indication at this time, cm suggested PT/OT to MD.    PCP: Patient recently came from [facility] in [place], was schedule for PCP appointment at [facility] on Friday but appt was canceled. Current Advanced Directive/Advance Care Plan:  AMD not on file, her daughter Tommy Schroeder explained that she has Medical POA. Transition of Care Plan:                    CM noted patient has dementia, and contacted her daughter, Tommy Schroeder. Kirsten Alvarado confirmed patient demographics, insurance([place] Plan),and PCP information. Patient was at an independent living facility in [place], and arrived at her daughter apartment last Tuesday to visit but her daughter says she will be permanently living with her. Prior to admission, patient required minimal assistance with ADLs and IADLs using a walker for ambulation.  Her daughter, Kirsten Alvarado is going to check with her brother about patient Medicaid spend down. Patient is currently on oxygen but does not use any at home. CM will follow for any discharge needs. TATA sent a message to MD through [address] [place] [address] about PT/OT consults if patient is appropriate to work with them. Observation notice provided in writing to patient and/or caregiver as well as verbal explanation of the policy.   Patients who are in outpatient status also receive the Observation notice          Rivas Lemos [facility]

## 2020-07-20 NOTE — NURSE NAVIGATOR
Chart reviewed by Heart Failure Nurse Navigator. Heart Failure database completed. EF:   Per cardiology note recent echo showed normal LVEF    ACEi/ARB/ARNi: not indicated    BB: Coreg    Aldosterone Antagonist: Aldactone    Obstructive Sleep Apnea Screening:No   STOP-BANG score:   Referred to Sleep Medicine:     CRT not indicted     NYHA Functional Class requested via provider message on [facility] [employer]    Heart Failure Teach Back in Patient Education. Heart Failure Avoiding Triggers on Discharge Instructions. Cardiologist: S      Post discharge follow up phone call to be made within 48-72 hours of discharge.

## 2020-07-20 NOTE — PROGRESS NOTES
Patient Vitals for the past 12 hrs:   Temp Pulse Resp BP SpO2   07/20/20 0555  84 29 150/63 92 %   07/20/20 0400  82      07/20/20 0322 97.8 °F (36.6 °C) 82 26 (!) 173/96 94 %   07/20/20 0055 97.9 °F (36.6 °C) 80 24 174/78 100 %   07/19/20 2350 97.7 °F (36.5 °C) 79 24 135/74 98 %   07/19/20 2300 98.2 °F (36.8 °C) 81 24 125/68 95 %   07/19/20 2145  88 20 (!) 137/91 98 %   07/19/20 2121     98 %   07/19/20 2100  81 23 (!) 144/95 93 %   07/19/20 2015  85 20 (!) 156/92 95 %   07/19/20 1930  81 23 150/76 93 %   07/19/20 1845  76 23 142/84 93 %     Last 3 Recorded Weights in this Encounter    07/19/20 1741 07/20/20 0055   Weight: 63.6 kg (140 lb 3.4 oz) 62.5 kg (137 lb 11.2 oz)     Verbal shift change report given to  Perico(oncoming nurse) by Krzysztof Lyons (offgoing nurse). Report included the following information SBAR, Kardex, Intake/Output, Recent Results and Cardiac Rhythm afib. Primary Nurse Audrey Baker and Fredrick Coe RN performed a dual skin assessment on this patient No impairment noted  Reji score is 20. TRANSFER - IN REPORT:    Verbal report received from RN on [facility]  being received from [facility] for routine progression of care      Report consisted of patients Situation, Background, Assessment and   Recommendations(SBAR). Information from the following report(s) SBAR, Kardex, Recent Results and Cardiac Rhythm afib was reviewed with the receiving nurse. Opportunity for questions and clarification was provided. Assessment completed upon patients arrival to unit and care assume. Problem: Falls - Risk of  Goal: *Absence of Falls  Description: Document Isha Bains Fall Risk and appropriate interventions in the flowsheet.   Outcome: Progressing Towards Goal  Note: Fall Risk Interventions:  Mobility Interventions: Communicate number of staff needed for ambulation/transfer, Patient to call before getting OOB         Medication Interventions: Patient to call before getting OOB    Elimination Interventions: Call light in reach, Patient to call for help with toileting needs    History of Falls Interventions: Room close to nurse's station         Problem: Patient Education: Go to Patient Education Activity  Goal: Patient/Family Education  Outcome: Progressing Towards Goal     Problem: Heart Failure: Day 1  Goal: Activity/Safety  Outcome: Progressing Towards Goal  Goal: Consults, if ordered  Outcome: Progressing Towards Goal  Goal: Nutrition/Diet  Outcome: Progressing Towards Goal  Goal: Respiratory  Outcome: Progressing Towards Goal  Goal: Treatments/Interventions/Procedures  Outcome: Progressing Towards Goal     Problem: Heart Failure: Day 2  Goal: Respiratory  Outcome: Progressing Towards Goal  Goal: Treatments/Interventions/Procedures  Outcome: Progressing Towards Goal

## 2020-07-21 LAB
ANION GAP SERPL CALC-SCNC: 3 MMOL/L (ref 5–15)
BNP SERPL-MCNC: 1728 PG/ML
BUN SERPL-MCNC: 14 MG/DL (ref 6–20)
BUN/CREAT SERPL: 14 (ref 12–20)
CALCIUM SERPL-MCNC: 8.5 MG/DL (ref 8.5–10.1)
CHLORIDE SERPL-SCNC: 102 MMOL/L (ref 97–108)
CO2 SERPL-SCNC: 34 MMOL/L (ref 21–32)
CREAT SERPL-MCNC: 1.01 MG/DL (ref 0.55–1.02)
GLUCOSE BLD STRIP.AUTO-MCNC: 137 MG/DL (ref 65–100)
GLUCOSE SERPL-MCNC: 89 MG/DL (ref 65–100)
POTASSIUM SERPL-SCNC: 3.7 MMOL/L (ref 3.5–5.1)
SERVICE CMNT-IMP: ABNORMAL
SODIUM SERPL-SCNC: 139 MMOL/L (ref 136–145)

## 2020-07-21 PROCEDURE — 36415 COLL VENOUS BLD VENIPUNCTURE: CPT

## 2020-07-21 PROCEDURE — 99218 HC RM OBSERVATION: CPT

## 2020-07-21 PROCEDURE — 83880 ASSAY OF NATRIURETIC PEPTIDE: CPT

## 2020-07-21 PROCEDURE — 74011250637 HC RX REV CODE- 250/637: Performed by: INTERNAL MEDICINE

## 2020-07-21 PROCEDURE — 97530 THERAPEUTIC ACTIVITIES: CPT

## 2020-07-21 PROCEDURE — 77010033678 HC OXYGEN DAILY

## 2020-07-21 PROCEDURE — 97116 GAIT TRAINING THERAPY: CPT

## 2020-07-21 PROCEDURE — 80048 BASIC METABOLIC PNL TOTAL CA: CPT

## 2020-07-21 PROCEDURE — 74011250637 HC RX REV CODE- 250/637: Performed by: HOSPITALIST

## 2020-07-21 PROCEDURE — 82962 GLUCOSE BLOOD TEST: CPT

## 2020-07-21 PROCEDURE — 74011250636 HC RX REV CODE- 250/636: Performed by: INTERNAL MEDICINE

## 2020-07-21 PROCEDURE — 96376 TX/PRO/DX INJ SAME DRUG ADON: CPT

## 2020-07-21 RX ORDER — FUROSEMIDE 10 MG/ML
40 INJECTION INTRAMUSCULAR; INTRAVENOUS DAILY
Status: DISCONTINUED | OUTPATIENT
Start: 2020-07-22 | End: 2020-07-22

## 2020-07-21 RX ADMIN — LOSARTAN POTASSIUM 100 MG: 50 TABLET, FILM COATED ORAL at 08:05

## 2020-07-21 RX ADMIN — RIVAROXABAN 15 MG: 15 TABLET, FILM COATED ORAL at 08:06

## 2020-07-21 RX ADMIN — CARVEDILOL 12.5 MG: 12.5 TABLET, FILM COATED ORAL at 17:40

## 2020-07-21 RX ADMIN — SPIRONOLACTONE 25 MG: 25 TABLET ORAL at 08:06

## 2020-07-21 RX ADMIN — FUROSEMIDE 40 MG: 10 INJECTION, SOLUTION INTRAMUSCULAR; INTRAVENOUS at 08:05

## 2020-07-21 RX ADMIN — POTASSIUM CHLORIDE 40 MEQ: 750 TABLET, FILM COATED, EXTENDED RELEASE ORAL at 08:05

## 2020-07-21 RX ADMIN — CARVEDILOL 12.5 MG: 12.5 TABLET, FILM COATED ORAL at 08:06

## 2020-07-21 NOTE — ROUTINE PROCESS
Bedside and Verbal shift change report given to Marilu Goodman RN (oncoming nurse) by Basil King RN (offgoing nurse). Report included the following information SBAR, Kardex, Intake/Output, MAR, Accordion, Recent Results, Med Rec Status and Cardiac Rhythm Afib.

## 2020-07-21 NOTE — PROGRESS NOTES
Cardiology Progress Note  2020     Admit Date: 2020  Admit Diagnosis: CHF (congestive heart failure) ([facility]) [I50.9]  CC: none currently    Assessment:   Principal Problem:    CHF (congestive heart failure) ([facility] [address] (2020)      Plan:     Decreased lasix to once daily dosing  Daily labs  Cont other cardiac meds    Subjective:      Mimi Gomez feels better. LYNDSAY      Objective:    Physical Exam:  Overall VSSAF;    Visit Vitals  /85 (BP 1 Location: Left arm, BP Patient Position: At rest)   Pulse 78   Temp 98.4 °F (36.9 °C)   Resp 20   Ht 5' 3\" (1.6 m)   Wt 64 kg (141 lb)   SpO2 99%   BMI 24.98 kg/m²     Temp (24hrs), Av.1 °F (36.7 °C), Min:97.8 °F (36.6 °C), Max:98.5 °F (36.9 °C)    Patient Vitals for the past 8 hrs:   Pulse   20 0704 78   20 0357 75    Patient Vitals for the past 8 hrs:   Resp   20 0704 20   20 0357 14    Patient Vitals for the past 8 hrs:   BP   20 0704 151/85   20 0357 148/71       1901 -  0700  In: -   Out: 1700 [Urine:1700]      General Appearance: Well developed, well nourished, no acute distress. Ears/Nose/Mouth/Throat:   Normal MM; anicteric. JVP: WNL   Resp:   Improved   Cardiovascular:  Irreg   Abdomen:   Soft, non-tender, bowel sounds are present. Extremities: + edema bilaterally. Skin:  Neuro: Warm and dry.   A/O x3, grossly nonfocal                         Data Review:     Telemetry independently reviewed :   AFIB         Labs:   Recent Results (from the past 24 hour(s))   METABOLIC PANEL, BASIC    Collection Time: 20  4:47 AM   Result Value Ref Range    Sodium 139 136 - 145 mmol/L    Potassium 3.7 3.5 - 5.1 mmol/L    Chloride 102 97 - 108 mmol/L    CO2 34 (H) 21 - 32 mmol/L    Anion gap 3 (L) 5 - 15 mmol/L    Glucose 89 65 - 100 mg/dL    BUN 14 6 - 20 MG/DL    Creatinine 1.01 0.55 - 1.02 MG/DL    BUN/Creatinine ratio 14 12 - 20      GFR est AA >60 >60 ml/min/1.73m2    GFR est non-AA 51 (L) >60 ml/min/1.73m2    Calcium 8.5 8.5 - 10.1 MG/DL   NT-PRO BNP    Collection Time: 07/21/20  4:47 AM   Result Value Ref Range    NT pro-BNP 1,728 (H) <450 PG/ML      Current medications reviewed       Abbie Reyes MD

## 2020-07-21 NOTE — PROGRESS NOTES
TRANSFER - OUT REPORT:    Verbal report given to Mar(name) on Marj Shankar  being transferred to [facility](unit) for routine progression of care       Report consisted of patients Situation, Background, Assessment and   Recommendations(SBAR). Information from the following report(s) SBAR, Kardex, ED Summary, Accordion and Recent Results was reviewed with the receiving nurse. Lines:   Peripheral IV 07/19/20 Right Hand (Active)   Site Assessment Clean, dry, & intact 07/21/20 1620   Phlebitis Assessment 0 07/21/20 1620   Infiltration Assessment 0 07/21/20 1620   Dressing Status Clean, dry, & intact 07/21/20 1620   Dressing Type Tape;Transparent 07/21/20 1620   Hub Color/Line Status Pink;Capped 07/21/20 1620   Action Taken Open ports on tubing capped 07/21/20 1620   Alcohol Cap Used Yes 07/21/20 1620        Opportunity for questions and clarification was provided. Patient transported with:   Tech                    Problem: Falls - Risk of  Goal: *Absence of Falls  Description: Document Janet Fall Risk and appropriate interventions in the flowsheet. Outcome: Progressing Towards Goal  Note: Fall Risk Interventions:  Mobility Interventions: Communicate number of staff needed for ambulation/transfer, Patient to call before getting OOB, PT Consult for mobility concerns, PT Consult for assist device competence         Medication Interventions: Patient to call before getting OOB, Evaluate medications/consider consulting pharmacy, Teach patient to arise slowly    Elimination Interventions: Call light in reach, Patient to call for help with toileting needs, Toileting schedule/hourly rounds    History of Falls Interventions: Door open when patient unattended, Evaluate medications/consider consulting pharmacy, Investigate reason for fall, Room close to nurse's station         Problem: Heart Failure: Day 3  Goal: Respiratory  Outcome: Progressing Towards Goal         Pt on IV lasix.  Maintained 02 sats WDL on 2 L NC. Will wean off as tolerated.

## 2020-07-21 NOTE — PROGRESS NOTES
Verbal shift change report given to [address] [place] [address] (oncoming nurse) by Manuel Hernandez (offgoing nurse). Report included the following information SBAR, Kardex, Intake/Output, MAR and Recent Results.

## 2020-07-21 NOTE — PROGRESS NOTES
Problem: Mobility Impaired (Adult and Pediatric)  Goal: *Acute Goals and Plan of Care (Insert Text)  Description: FUNCTIONAL STATUS PRIOR TO ADMISSION: Pt lived at an independent living facility. She was independent with mobility and ADLs, used a RW as needed     HOME SUPPORT PRIOR TO ADMISSION: Pt lives alone in [facility], does not drive, daughter lives in [place] and is involved in her care    Physical Therapy Goals  Initiated 7/20/2020  1. Patient will move from supine to sit and sit to supine , scoot up and down, and roll side to side in bed with independence within 7 day(s). 2.  Patient will transfer from bed to chair and chair to bed with modified independence using the least restrictive device within 7 day(s). 3.  Patient will perform sit to stand with modified independence within 7 day(s). 4.  Patient will ambulate with contact guard assist for 50 feet with the least restrictive device within 7 day(s). 5.  Patient will ascend/descend 4 stairs with single handrail(s) with minimal assistance/contact guard assist within 7 day(s). Outcome: Progressing Towards Goal  PHYSICAL THERAPY TREATMENT  Patient: Pablo Echeverria (69 y.o. female)  Date: 7/21/2020  Diagnosis: CHF (congestive heart failure) ([facility]) [I50.9]   CHF (congestive heart failure) ([facility] [address]       Precautions:    Chart, physical therapy assessment, plan of care and goals were reviewed. ASSESSMENT  Patient continues with skilled PT services and is progressing towards goals. Pt received seated in a chair on 2L of O2, agreeable to work with therapy and requesting to use the bathroom. She was able to take a few steps chair<>BSC with HHA, though demonstrated overall unsteadiness and reached out for external support. When pt was provided a RW, she was able to progress ambulation to 20 ft- requiring Maurisio for slight postural sway and posterior trunk lean. Pt performed additional sit<>stand transfer from chair with verbal cuing for technqiue.   VSS throughout (HR 90s, and O2 sats >98% with activity). Continuing to recommend [facility] PT with increased assist from family. Current Level of Function Impacting Discharge (mobility/balance): Maurisio for transfers and ambulation     Other factors to consider for discharge: fall risk, lives alone, on 2L O2         PLAN :  Patient continues to benefit from skilled intervention to address the above impairments. Continue treatment per established plan of care. to address goals. Recommendation for discharge: (in order for the patient to meet his/her long term goals)  Physical therapy at least 2 days/week in the home AND ensure assist and/or supervision for safety with mobility and ADLs    This discharge recommendation:  Has not yet been discussed the attending provider and/or case management    IF patient discharges home will need the following DME: patient owns DME required for discharge       SUBJECTIVE:   Patient stated I don't know how I'm doing, I guess I'll know in a minute.     OBJECTIVE DATA SUMMARY:   Critical Behavior:  Neurologic State: Alert  Orientation Level: Disoriented to time, Oriented to person, Oriented to place, Oriented to situation  Cognition: Follows commands  Safety/Judgement: Awareness of environment, Good awareness of safety precautions  Functional Mobility Training:  Bed Mobility:                    Transfers:  Sit to Stand: Minimum assistance  Stand to Sit: Minimum assistance        Bed to Chair: Minimum assistance                    Balance:  Sitting: Intact  Standing: Impaired; With support  Standing - Static: Fair;Constant support  Standing - Dynamic : Fair;Constant support  Ambulation/Gait Training:  Distance (ft): 20 Feet (ft)(plus 3 ft x2 reps)  Assistive Device: Gait belt;Walker, rolling  Ambulation - Level of Assistance: Minimal assistance;Assist x1        Gait Abnormalities: Decreased step clearance;Shuffling gait;Trunk sway increased        Base of Support: Narrowed  Stance: Left decreased;Right decreased  Speed/Bethany: Slow;Shuffled  Step Length: Right shortened;Left shortened      Activity Tolerance:   Good on 2L O2  Please refer to the flowsheet for vital signs taken during this treatment. After treatment patient left in no apparent distress:   Sitting in chair and Call bell within reach    COMMUNICATION/COLLABORATION:   The patients plan of care was discussed with: Registered nurse.      Dieter Monroy, PT, DPT   Time Calculation: 25 mins

## 2020-07-22 ENCOUNTER — APPOINTMENT (OUTPATIENT)
Dept: GENERAL RADIOLOGY | Age: 85
DRG: 291 | End: 2020-07-22
Attending: INTERNAL MEDICINE
Payer: MEDICARE

## 2020-07-22 PROBLEM — J96.00 ACUTE RESPIRATORY FAILURE (HCC): Status: ACTIVE | Noted: 2020-07-22

## 2020-07-22 LAB
ANION GAP SERPL CALC-SCNC: 7 MMOL/L (ref 5–15)
BUN SERPL-MCNC: 24 MG/DL (ref 6–20)
BUN/CREAT SERPL: 18 (ref 12–20)
CALCIUM SERPL-MCNC: 9 MG/DL (ref 8.5–10.1)
CHLORIDE SERPL-SCNC: 101 MMOL/L (ref 97–108)
CO2 SERPL-SCNC: 29 MMOL/L (ref 21–32)
CREAT SERPL-MCNC: 1.31 MG/DL (ref 0.55–1.02)
ERYTHROCYTE [DISTWIDTH] IN BLOOD BY AUTOMATED COUNT: 13.3 % (ref 11.5–14.5)
GLUCOSE SERPL-MCNC: 95 MG/DL (ref 65–100)
HCT VFR BLD AUTO: 40.6 % (ref 35–47)
HGB BLD-MCNC: 13.1 G/DL (ref 11.5–16)
MAGNESIUM SERPL-MCNC: 2.2 MG/DL (ref 1.6–2.4)
MCH RBC QN AUTO: 30.5 PG (ref 26–34)
MCHC RBC AUTO-ENTMCNC: 32.3 G/DL (ref 30–36.5)
MCV RBC AUTO: 94.6 FL (ref 80–99)
NRBC # BLD: 0 K/UL (ref 0–0.01)
NRBC BLD-RTO: 0 PER 100 WBC
PLATELET # BLD AUTO: 179 K/UL (ref 150–400)
PMV BLD AUTO: 11.7 FL (ref 8.9–12.9)
POTASSIUM SERPL-SCNC: 4.3 MMOL/L (ref 3.5–5.1)
RBC # BLD AUTO: 4.29 M/UL (ref 3.8–5.2)
SODIUM SERPL-SCNC: 137 MMOL/L (ref 136–145)
WBC # BLD AUTO: 11.1 K/UL (ref 3.6–11)

## 2020-07-22 PROCEDURE — 80048 BASIC METABOLIC PNL TOTAL CA: CPT

## 2020-07-22 PROCEDURE — 74011250637 HC RX REV CODE- 250/637: Performed by: INTERNAL MEDICINE

## 2020-07-22 PROCEDURE — 74011250637 HC RX REV CODE- 250/637: Performed by: HOSPITALIST

## 2020-07-22 PROCEDURE — 71045 X-RAY EXAM CHEST 1 VIEW: CPT

## 2020-07-22 PROCEDURE — 65660000000 HC RM CCU STEPDOWN

## 2020-07-22 PROCEDURE — 99218 HC RM OBSERVATION: CPT

## 2020-07-22 PROCEDURE — 85027 COMPLETE CBC AUTOMATED: CPT

## 2020-07-22 PROCEDURE — 36415 COLL VENOUS BLD VENIPUNCTURE: CPT

## 2020-07-22 PROCEDURE — 83735 ASSAY OF MAGNESIUM: CPT

## 2020-07-22 PROCEDURE — 97116 GAIT TRAINING THERAPY: CPT

## 2020-07-22 RX ADMIN — POTASSIUM CHLORIDE 40 MEQ: 750 TABLET, FILM COATED, EXTENDED RELEASE ORAL at 09:28

## 2020-07-22 RX ADMIN — LOSARTAN POTASSIUM 100 MG: 50 TABLET, FILM COATED ORAL at 09:29

## 2020-07-22 RX ADMIN — RIVAROXABAN 15 MG: 15 TABLET, FILM COATED ORAL at 09:28

## 2020-07-22 RX ADMIN — SPIRONOLACTONE 25 MG: 25 TABLET ORAL at 09:29

## 2020-07-22 RX ADMIN — CARVEDILOL 12.5 MG: 12.5 TABLET, FILM COATED ORAL at 18:08

## 2020-07-22 RX ADMIN — CARVEDILOL 12.5 MG: 12.5 TABLET, FILM COATED ORAL at 09:28

## 2020-07-22 NOTE — PROGRESS NOTES
Pt A&Ox2, pleasantly confused. Updated daughter Yash Kent this AM on the phone and Dr Verito Alcantar spoke with her as well. Lasix held today d/t renal function. PT/OT  Following. OOB to chair part of the day. Hourly rounding done. Call bell and personal items within reach. Bedside shift change report given to [address] [place] [address] (oncoming nurse) by Ronny Finnegan (offgoing nurse). Report included the following information SBAR, Kardex and MAR.

## 2020-07-22 NOTE — PHYSICIAN ADVISORY
Letter of Status Determination:   Recommend hospitalization status upgraded from   OBSERVATION  to INPATIENT  Status     Pt Name:  Mimi Gomez   MR#   [facility] # [id number] /  [id number]  Payor: Truong Thorpe / Plan: Jaycee Dalal MEDICARE CHOICE PPO/PFFS / Product Type: Managed Care Medicare /    BOB#  [id number]   Room and Hospital  350/01  @ [facility]   Hospitalization date  7/19/2020  5:34 PM   Current Attending Physician  Allan Harris MD   Principal diagnosis  CHF (congestive heart failure) ([facility] [address]      Clinicals    Mimi Gomez is a 80 y.o. female with atrial fibrillation, congestive heart failure, hypertension presented emergency room for evaluation of shortness of breath, Pt w/ h/o dementia  Was started on diuretics, creatinine trending up, lasix held, strict I/O, BP soft, was needing O2 support      [place] (MCG) criteria    M-[id number]     STATUS DETERMINATION  INPATIENT    The final decision of the patient's hospitalization status depends on the attending physician's judgment    Additional comments     Payor: [employer] MEDICARE / Plan: Jaycee Dalal MEDICARE CHOICE PPO/PFFS / Product Type: [employer] Medicare /         Leeann Tiwari MD  Cell: [phone]  Physician Advisor

## 2020-07-22 NOTE — PROGRESS NOTES
Cardiology Progress Note  2020     Admit Date: 2020  Admit Diagnosis: CHF (congestive heart failure) ([facility]) [I50.9]  Acute respiratory failure ([facility]) [J96.00]  CC: none currently    Assessment:   Principal Problem:    CHF (congestive heart failure) ([facility] [address] (2020)    Active Problems:    Acute respiratory failure ([facility] [address] (2020)      Plan:     Hold lasix today  Cont other cardiac meds  Echo recently with normal LVEF  Labs in AM, plan to switch to PO    Subjective:      Read Hinders has no c/o    Objective:    Physical Exam:  Overall VSSAF;    Visit Vitals  /64 (BP 1 Location: Left arm, BP Patient Position: At rest)   Pulse 76   Temp 97.9 °F (36.6 °C)   Resp 18   Ht 5' 3\" (1.6 m)   Wt 61.2 kg (134 lb 14.7 oz)   SpO2 95%   BMI 23.90 kg/m²     Temp (24hrs), Av.9 °F (36.6 °C), Min:97.5 °F (36.4 °C), Max:98.5 °F (36.9 °C)    Patient Vitals for the past 8 hrs:   Pulse   20 1103 76   20 0756 85    Patient Vitals for the past 8 hrs:   Resp   20 1103 18   20 0756 18    Patient Vitals for the past 8 hrs:   BP   20 1103 101/64   20 0756 143/81       1901 -  0700  In: -   Out: 950 [Urine:950]      General Appearance: Well developed, well nourished, no acute distress. Ears/Nose/Mouth/Throat:   Normal MM; anicteric. JVP: WNL   Resp:   Improved   Cardiovascular:  Irreg   Abdomen:   Soft, non-tender, bowel sounds are present. Extremities: + edema bilaterally. Skin:  Neuro: Warm and dry.   A/O x3, grossly nonfocal                         Data Review:     Telemetry independently reviewed :   AFIB         Labs:   Recent Results (from the past 24 hour(s))   GLUCOSE, POC    Collection Time: 20  6:32 PM   Result Value Ref Range    Glucose (POC) 137 (H) 65 - 100 mg/dL    Performed by Jaclyn Holcomb    CBC W/O DIFF    Collection Time: 20  3:00 AM   Result Value Ref Range    WBC 11.1 (H) 3.6 - 11.0 K/uL    RBC 4.29 3.80 - 5.20 M/uL    HGB 13.1 11.5 - 16.0 g/dL    HCT 40.6 35.0 - 47.0 %    MCV 94.6 80.0 - 99.0 FL    MCH 30.5 26.0 - 34.0 PG    MCHC 32.3 30.0 - 36.5 g/dL    RDW 13.3 11.5 - 14.5 %    PLATELET 798 143 - 764 K/uL    MPV 11.7 8.9 - 12.9 FL    NRBC 0.0 0  WBC    ABSOLUTE NRBC 0.00 0.00 - 2.69 K/uL   METABOLIC PANEL, BASIC    Collection Time: 07/22/20  3:00 AM   Result Value Ref Range    Sodium 137 136 - 145 mmol/L    Potassium 4.3 3.5 - 5.1 mmol/L    Chloride 101 97 - 108 mmol/L    CO2 29 21 - 32 mmol/L    Anion gap 7 5 - 15 mmol/L    Glucose 95 65 - 100 mg/dL    BUN 24 (H) 6 - 20 MG/DL    Creatinine 1.31 (H) 0.55 - 1.02 MG/DL    BUN/Creatinine ratio 18 12 - 20      GFR est AA 46 (L) >60 ml/min/1.73m2    GFR est non-AA 38 (L) >60 ml/min/1.73m2    Calcium 9.0 8.5 - 10.1 MG/DL   MAGNESIUM    Collection Time: 07/22/20  3:00 AM   Result Value Ref Range    Magnesium 2.2 1.6 - 2.4 mg/dL      Current medications reviewed       Irina Chao MD

## 2020-07-22 NOTE — PROGRESS NOTES
Bedside and Verbal shift change report given to Mary Hall (oncoming nurse) by Swathi [address] (offgoing nurse). Report included the following information SBAR, ED Summary, Intake/Output, Recent Results, Med Rec Status and Cardiac Rhythm Afib.

## 2020-07-22 NOTE — PROGRESS NOTES
Problem: Mobility Impaired (Adult and Pediatric)  Goal: *Acute Goals and Plan of Care (Insert Text)  Description: FUNCTIONAL STATUS PRIOR TO ADMISSION: Pt lived at an independent living facility. She was independent with mobility and ADLs, used a RW as needed     HOME SUPPORT PRIOR TO ADMISSION: Pt lives alone in [place], does not drive, daughter lives in [place] and is involved in her care    Physical Therapy Goals  Initiated 7/20/2020  1. Patient will move from supine to sit and sit to supine , scoot up and down, and roll side to side in bed with independence within 7 day(s). 2.  Patient will transfer from bed to chair and chair to bed with modified independence using the least restrictive device within 7 day(s). 3.  Patient will perform sit to stand with modified independence within 7 day(s). 4.  Patient will ambulate with contact guard assist for 50 feet with the least restrictive device within 7 day(s). 5.  Patient will ascend/descend 4 stairs with single handrail(s) with minimal assistance/contact guard assist within 7 day(s). Outcome: Progressing Towards Goal    PHYSICAL THERAPY TREATMENT  Patient: River Guerra (22 y.o. female)  Date: 7/22/2020  Diagnosis: CHF (congestive heart failure) ([facility] [I50.9]  Acute respiratory failure (HCC) [J96.00]   [facility]       Precautions:    Chart, physical therapy assessment, plan of care and goals were reviewed. ASSESSMENT  Patient continues with skilled PT services and is progressing towards goals. Patient willing to participate but needed encouragement to initiate movements (possibly anxious). Overall Maurisio-CGA for sit<>stand multiple trials and to ambulate 20ft with RW. Mild dyspnea with activity on RA but recovered quickly (pulse ox not available). Encouraged pursed lip breathing throughout.      Current Level of Function Impacting Discharge (mobility/balance): Maurisio-CGA with RW    Other factors to consider for discharge: PLAN :  Patient continues to benefit from skilled intervention to address the above impairments. Continue treatment per established plan of care. to address goals. Recommendation for discharge: (in order for the patient to meet his/her long term goals)  Physical therapy at least 2 days/week in the home     This discharge recommendation:  Has been made in collaboration with the attending provider and/or case management    IF patient discharges home will need the following DME: to be determined (TBD)       SUBJECTIVE:   Patient stated I don't know if I can.     OBJECTIVE DATA SUMMARY:   Critical Behavior:  Neurologic State: Alert, Confused  Orientation Level: Oriented to person, Oriented to place, Disoriented to time, Disoriented X4  Cognition: Follows commands, Memory loss  Safety/Judgement: Awareness of environment, Good awareness of safety precautions  Functional Mobility Training:  Bed Mobility:                    Transfers:  Sit to Stand: Contact guard assistance; Additional time  Stand to Sit: Contact guard assistance; Additional time                             Balance:  Sitting: Intact  Standing: Impaired  Standing - Static: Good;Constant support  Standing - Dynamic : Fair;Constant support  Ambulation/Gait Training:  Distance (ft): 20 Feet (ft)  Assistive Device: Gait belt;Walker, rolling  Ambulation - Level of Assistance: Contact guard assistance        Gait Abnormalities: Decreased step clearance;Shuffling gait        Base of Support: Narrowed     Speed/Bethany: Slow  Step Length: Right shortened;Left shortened                Therapeutic Exercises:   Performed seated LAQ and marches 2x10  Pain Rating:  Denies pain this session    Activity Tolerance:   Good and requires rest breaks  Please refer to the flowsheet for vital signs taken during this treatment.     After treatment patient left in no apparent distress:   Sitting in chair, Call bell within reach, and Bed / chair alarm activated    COMMUNICATION/COLLABORATION:   The patients plan of care was discussed with: Registered nurse.      Ene Fowler, PT   Time Calculation: 20 mins

## 2020-07-22 NOTE — PROGRESS NOTES
[facility] Adult  Hospitalist Group                                                                                          Hospitalist Progress Note  Jono Verduzco MD  Answering service: [phone] from in house phone        Date of Service:  2020  NAME:  Nicolás Martinez  :  1923  MRN:  [id number]      Admission Summary:     Patient was brought to the ED due to worsening dyspnea. She was diagnosed with pneumonia last Friday and is on antibiotics. She was also treated for presumed pneumonia several weeks ago in [place]. Patient lives in [place] was brought to live with daughter last week. Her significant medical history includes atrial fibrillation congestive heart failure. With her ongoing symptoms of dyspnea and shortness of breath, her doctors (PCP and cardiologist) were increasing her diuretic and carvedilol.       Interval history / Subjective:     Patient is a poor historian, disoriented but she said she feels better     Assessment & Plan:     Acute hypoxic respiratory failure likely due to acute diastolic CHF diastolic, NYHA class IV  -Responded to diuretics but creatinine up and lasix iv held on   -continue coreg, losartan, Spironolactone 25 mg daily initiated  -monitor electrolyte on IV diuretics, daily I/O and weight  -Given recent echocardiogram in May, 2020 in [place] as noted below, no need of echo per cardiology  -As per daughter report patient had an echocardiogram back in May 11, 2020 in [place] that showed ejection fraction of 60 to 65%.  Patient has known diastolic dysfunction.  -Wean off O2 as tolerated. No home O2 at baseline.  -Cardiology on board. Appreciate recommendations. -SARS-CoV-2 result negative     Chronic atrial fibrillation, Rate controlled.   -Continue beta-blocker carvedilol and Xarelto.       HTN  -Continue carvedilol, losartan, lasix, spironolactone, monitor BP     Dementia  - not on medications at home.   Patient is alert and oriented.          Code status: Full Code  DVT prophylaxis: Xarelto    Care Plan discussed with: Patient/Family and Nurse  Anticipated Disposition: Home with Jono Rosario  Anticipated Discharge: 24 hours to 48 hours     Hospital Problems  Date Reviewed: 7/19/2020          Codes Class Noted POA    * (Principal) CHF (congestive heart failure) ([facility] ICD-10-CM: I50.9  ICD-9-CM: 428.0  7/19/2020 Unknown              Vital Signs:    Last 24hrs VS reviewed since prior progress note. Most recent are:  Visit Vitals  /81 (BP 1 Location: Left arm, BP Patient Position: At rest)   Pulse 85   Temp 98.5 °F (36.9 °C)   Resp 18   Ht 5' 3\" (1.6 m)   Wt 61.2 kg (134 lb 14.7 oz)   SpO2 93%   BMI 23.90 kg/m²         Intake/Output Summary (Last 24 hours) at 7/22/2020 0949  Last data filed at 7/21/2020 1701  Gross per 24 hour   Intake    Output 850 ml   Net -850 ml        Physical Examination:             Constitutional:  No acute distress, cooperative, pleasant    ENT:  Oral mucosa moist, oropharynx benign. Resp:  Decrease bronchial breath sound bilaterally. No wheezing/rhonchi/rales. No accessory muscle use   CV:  Regular rhythm, normal rate, no murmurs, gallops, rubs    GI:  Soft, non distended, non tender. normoactive bowel sounds, no hepatosplenomegaly     Musculoskeletal:  No edema     Neurologic:  Moves all extremities.   AAOx3, CN II-XII reviewed     Skin:  Good turgor, no rashes or ulcers       Data Review:    Review and/or order of clinical lab test  Review and/or order of tests in the radiology section of CPT  Review and/or order of tests in the medicine section of CPT      Labs:     Recent Labs     07/22/20  0300 07/20/20  0616   WBC 11.1* 8.3   HGB 13.1 12.3   HCT 40.6 40.7    147*     Recent Labs     07/22/20  0300 07/21/20  0447 07/20/20  0616    139 140   K 4.3 3.7 3.2*    102 100   CO2 29 34* 33*   BUN 24* 14 9   CREA 1.31* 1.01 0.64   GLU 95 89 93   CA 9.0 8.5 8.5   MG 2.2  --  2.0     Recent Labs 07/19/20 1757   ALT 30   AP 53   TBILI 0.7   TP 6.7   ALB 3.2*   GLOB 3.5     Recent Labs     07/19/20 1757   INR 1.3*   PTP 12.6*   APTT 25.2      No results for input(s): FE, TIBC, PSAT, FERR in the last 72 hours. No results found for: FOL, RBCF   No results for input(s): PH, PCO2, PO2 in the last 72 hours.   Recent Labs     07/19/20 1757   TROIQ <0.05     No results found for: CHOL, CHOLX, CHLST, CHOLV, HDL, HDLP, LDL, LDLC, DLDLP, TGLX, TRIGL, TRIGP, CHHD, CHHDX  Lab Results   Component Value Date/Time    Glucose (POC) 137 (H) 07/21/2020 06:32 PM     Lab Results   Component Value Date/Time    Color YELLOW/STRAW 07/19/2020 06:59 PM    Appearance CLEAR 07/19/2020 06:59 PM    Specific gravity 1.010 07/19/2020 06:59 PM    pH (UA) 6.0 07/19/2020 06:59 PM    Protein Negative 07/19/2020 06:59 PM    Glucose Negative 07/19/2020 06:59 PM    Ketone Negative 07/19/2020 06:59 PM    Bilirubin Negative 07/19/2020 06:59 PM    Urobilinogen 1.0 07/19/2020 06:59 PM    Nitrites Negative 07/19/2020 06:59 PM    Leukocyte Esterase MODERATE (A) 07/19/2020 06:59 PM    Epithelial cells MODERATE (A) 07/19/2020 06:59 PM    Bacteria Negative 07/19/2020 06:59 PM    WBC 0-4 07/19/2020 06:59 PM    RBC 0-5 07/19/2020 06:59 PM         Medications Reviewed:     Current Facility-Administered Medications   Medication Dose Route Frequency    carvediloL (COREG) tablet 12.5 mg  12.5 mg Oral BID    losartan (COZAAR) tablet 100 mg  100 mg Oral DAILY    hydrALAZINE (APRESOLINE) 20 mg/mL injection 10 mg  10 mg IntraVENous Q6H PRN    rivaroxaban (XARELTO) tablet 15 mg  15 mg Oral DAILY    spironolactone (ALDACTONE) tablet 25 mg  25 mg Oral DAILY     ______________________________________________________________________  EXPECTED LENGTH OF STAY: - - -  ACTUAL LENGTH OF STAY:          0                 Minus MD Miguel

## 2020-07-22 NOTE — PROGRESS NOTES
Problem: Falls - Risk of  Goal: *Absence of Falls  Description: Document Donata Carrel Fall Risk and appropriate interventions in the flowsheet.   Outcome: Progressing Towards Goal  Note: Fall Risk Interventions:  Mobility Interventions: Bed/chair exit alarm, Patient to call before getting OOB, Strengthening exercises (ROM-active/passive)    Mentation Interventions: Bed/chair exit alarm, Adequate sleep, hydration, pain control, Increase mobility, More frequent rounding, Toileting rounds, Update white board    Medication Interventions: Bed/chair exit alarm, Assess postural VS orthostatic hypotension, Patient to call before getting OOB, Teach patient to arise slowly, Utilize gait belt for transfers/ambulation    Elimination Interventions: Call light in reach, Bed/chair exit alarm, Patient to call for help with toileting needs, Toileting schedule/hourly rounds    History of Falls Interventions: Bed/chair exit alarm, Assess for delayed presentation/identification of injury for 48 hrs (comment for end date), Room close to nurse's station, Investigate reason for fall

## 2020-07-22 NOTE — PROGRESS NOTES
7/22/2020 -   KELLY:  - RUR: 11%  - Likely disposition is for discharge to daughter's home with transport via daughter  - New PCP appt with [facility] will need to be rescheduled; likely to follow up with cardio  - Per previous CM Note and Handoff, this CM has submitted HH referral to [facility] via [facility]    - Diuretics on hold 7/22 due to Cr trending up  - Patient is now on room air    Medicare pt's daughter has verbally by phone received and reviewed, and verbally consented for signed first IM letter informing them of their right to appeal the discharge. Signed copy has been placed on pt bedside chart. CM submitted [facility] referral to [facility] via [address]; due to patient not having an established community based physician, patient may have difficulty establishing [facility] upon discharge until initial PCP appt. CM to follow.   CRM: Cal Torres, MPH, [address] [place] [address]; Z: [phone]

## 2020-07-22 NOTE — PROGRESS NOTES
[facility] Adult  Hospitalist Group                                                                                          Hospitalist Progress Note  Mario Nolasco MD  Answering service: [phone] OR [phone] from in house phone        Date of Service:  2020  NAME:  Se Miner  :  1923  MRN:  [id number]    This documentation was facilitated by a Voice Recognition software and may contain inadvertent typographical errors. Admission Summary:   Patient was brought to the ED due to worsening dyspnea. She was diagnosed with pneumonia last Friday and is on antibiotics. She was also treated for presumed pneumonia several weeks ago in [place]. Patient lives in [place] was brought to live with daughter last week. Her significant medical history includes atrial fibrillation congestive heart failure. With her ongoing symptoms of dyspnea and shortness of breath, her doctors (PCP and cardiologist) were increasing her diuretic and carvedilol. Interval history / Subjective:   Ms. Valdez Headley is doing well. Down to 2LPM O2 NC. Good I/O negative balance. No Home O2 at baseline. Breathing is improving. No other concerns or overnight events. Assessment & Plan:   # Acute hypoxic respiratory failure likely due to CHF, undetermined if systolic or diastolic, NYHA class IV  -Responded to diuretics, continue Lasix  -Cardiology increase Lasix to 40 mg IV twice daily  -Spironolactone 25 mg daily initiated  -Given recent echocardiogram in May, 2020 in [place] as noted below, no need of echo per cardiology  -As per daughter report patient had an echocardiogram back in May 11, 2020 in [place] that showed ejection fraction of 60 to 65%. Patient has known diastolic dysfunction. -BMP monitoring while on IV diuretics  -Daily weights, I/Os  -Wean off O2 as tolerated. No home O2 at baseline.  -Cardiology on board. Appreciate recommendations.   -SARS-CoV-2 result negative     # Chronic atrial fibrillation: Rate controlled. -Continue beta-blocker carvedilol  -Continue anticoagulation with Xarelto. # Hypertension: Continue carvedilol     # Dementia: not on medications at home. Patient is alert and oriented. DVT prophylaxis: Xarelto  CODE STATUS: Full code as per discussion with daughter  Surrogate decision maker is Aliyah Pearce (daughter and POA). Phone number [phone]. I have discussed case with daughter and updated her with treatment plan. Disposition: To be determined based on clinical progress and therapy evaluation  Anticipated discharge: 2-3 days. Hospital Problems  Date Reviewed: 7/19/2020          Codes Class Noted POA    * (Principal) CHF (congestive heart failure) ([facility] [address] ICD-10-CM: I50.9  ICD-9-CM: 428.0  7/19/2020 Unknown                Review of Systems:   A comprehensive review of systems was negative except for that written in the HPI. Vital Signs:    Last 24hrs VS reviewed since prior progress note. Most recent are:  Visit Vitals  /85 (BP 1 Location: Left arm, BP Patient Position: At rest)   Pulse 78   Temp 98.4 °F (36.9 °C)   Resp 20   Ht 5' 3\" (1.6 m)   Wt 64 kg (141 lb)   SpO2 99%   BMI 24.98 kg/m²         Intake/Output Summary (Last 24 hours) at 7/21/2020 0737  Last data filed at 7/20/2020 2030  Gross per 24 hour   Intake    Output 800 ml   Net -800 ml        Physical Examination:             Constitutional:  No acute distress, cooperative, pleasant    HEENT:  Atraumatic. Oral mucosa moist,. Non icteric sclera. No pallor. Resp:  Not in distress. On oxygen via nasal cannula. Mild basal crackles bilaterally   Chest Wall: No deformity   CV:  Regular rhythm, normal rate, no murmurs, gallops, rubs    GI:  Soft, non distended, non tender.  normoactive bowel sounds, no hepatosplenomegaly    :  No CVA or suprapubic tenderness    Musculoskeletal:  + Edema, warm, 2+ pulses throughout    Neurologic:  Mental status:AAOx3,   Cranial nerves II-XII : WNL  Motor exam:Moves all extremities symmetrically       Psych:  Good insight, Not anxious nor agitated. Data Review:    Review and/or order of clinical lab test  Review and/or order of tests in the radiology section of CPT  Review and/or order of tests in the medicine section of CPT    Xr Chest Port    Result Date: 7/19/2020  IMPRESSION: Congestive failure with interstitial edema and small bilateral pleural effusions. Labs:     Recent Labs     07/20/20 0616 07/19/20 1757   WBC 8.3 8.3   HGB 12.3 12.1   HCT 40.7 37.8   * 180     Recent Labs     07/21/20  0447 07/20/20 0616 07/19/20 1757    140 141   K 3.7 3.2* 3.3*    100 101   CO2 34* 33* 33*   BUN 14 9 11   CREA 1.01 0.64 0.98   GLU 89 93 126*   CA 8.5 8.5 8.9   MG  --  2.0  --      Recent Labs     07/19/20 1757   ALT 30   AP 53   TBILI 0.7   TP 6.7   ALB 3.2*   GLOB 3.5     Recent Labs     07/19/20 1757   INR 1.3*   PTP 12.6*   APTT 25.2      No results for input(s): FE, TIBC, PSAT, FERR in the last 72 hours. No results found for: FOL, RBCF   No results for input(s): PH, PCO2, PO2 in the last 72 hours.   Recent Labs     07/19/20 1757   TROIQ <0.05     No results found for: CHOL, CHOLX, CHLST, CHOLV, HDL, HDLP, LDL, LDLC, DLDLP, TGLX, TRIGL, TRIGP, CHHD, CHHDX  No results found for: [facility]  Lab Results   Component Value Date/Time    Color YELLOW/STRAW 07/19/2020 06:59 PM    Appearance CLEAR 07/19/2020 06:59 PM    Specific gravity 1.010 07/19/2020 06:59 PM    pH (UA) 6.0 07/19/2020 06:59 PM    Protein Negative 07/19/2020 06:59 PM    Glucose Negative 07/19/2020 06:59 PM    Ketone Negative 07/19/2020 06:59 PM    Bilirubin Negative 07/19/2020 06:59 PM    Urobilinogen 1.0 07/19/2020 06:59 PM    Nitrites Negative 07/19/2020 06:59 PM    Leukocyte Esterase MODERATE (A) 07/19/2020 06:59 PM    Epithelial cells MODERATE (A) 07/19/2020 06:59 PM    Bacteria Negative 07/19/2020 06:59 PM    WBC 0-4 07/19/2020 06:59 PM    RBC 0-5 07/19/2020 06:59 PM Medications Reviewed:     Current Facility-Administered Medications   Medication Dose Route Frequency    carvediloL (COREG) tablet 12.5 mg  12.5 mg Oral BID    losartan (COZAAR) tablet 100 mg  100 mg Oral DAILY    hydrALAZINE (APRESOLINE) 20 mg/mL injection 10 mg  10 mg IntraVENous Q6H PRN    rivaroxaban (XARELTO) tablet 15 mg  15 mg Oral DAILY    furosemide (LASIX) injection 40 mg  40 mg IntraVENous BID    spironolactone (ALDACTONE) tablet 25 mg  25 mg Oral DAILY    potassium chloride SR (KLOR-CON 10) tablet 40 mEq  40 mEq Oral DAILY     ______________________________________________________________________  EXPECTED LENGTH OF STAY: - - -  ACTUAL LENGTH OF STAY:          0                 Vidhya Rubi MD

## 2020-07-22 NOTE — ACP (ADVANCE CARE PLANNING)
[facility]  Hospitalist Group                                      Advance Care Planning Note    Name: Eugene Daniels  YOB: 1923  MRN: [id number]  Admission Date: 7/19/2020  5:34 PM    Date of discussion: 7/21/2020    Active Diagnoses:    Hospital Problems  Date Reviewed: 7/19/2020          Codes Class Noted POA    * (Principal) CHF (congestive heart failure) ([facility] [address] ICD-10-CM: I50.9  ICD-9-CM: 428.0  7/19/2020 Unknown              These active diagnoses are of sufficient risk that focused discussion on advance care planning is indicated in order to allow the patient to thoughtfully consider personal goals of care, and if situations arise that prevent the ability to personally give input, to ensure appropriate representation of their personal desires for different levels and aggressiveness of care. Discussion:     Persons present and participating in discussion: Eugene DanielsSonny MD     Discussion:   I had a detailed discussion regarding patient's CODE STATUS with patient. I explained to him why we need to have a discussion regarding the CODE STATUS, what the discussion entails, and what we want accomplished with this discussion. I explained to him what CODE STATUS means, the various kinds of code statuses including DNR/DNI, partial code, full code. Explained to him what each of that entails, the resuscitative modalities and partial code and full code, what DNR/DNI entails, and explained to him in detail the expectation associated with each CODE STATUS. I answered all his questions and concerns. He reports that he would like him to be a full code at this time  CODE STATUS full code. Time Spent:     Total time spent face-to-face in education and discussion: 17 minutes.      Sonny Robin MD  Date of Service:  7/21/2020  9:48 PM

## 2020-07-22 NOTE — PROGRESS NOTES
Problem: Falls - Risk of  Goal: *Absence of Falls  Description: Document Tanner Ryan Fall Risk and appropriate interventions in the flowsheet.   Outcome: Progressing Towards Goal  Note: Fall Risk Interventions:  Mobility Interventions: Bed/chair exit alarm, Communicate number of staff needed for ambulation/transfer, Utilize walker, cane, or other assistive device, Patient to call before getting OOB    Mentation Interventions: Bed/chair exit alarm, Door open when patient unattended, Room close to nurse's station, Reorient patient, Update white board    Medication Interventions: Bed/chair exit alarm, Patient to call before getting OOB, Teach patient to arise slowly    Elimination Interventions: Bed/chair exit alarm, Call light in reach, Toileting schedule/hourly rounds, Patient to call for help with toileting needs    History of Falls Interventions: Door open when patient unattended, Room close to nurse's station, Investigate reason for fall         Problem: Patient Education: Go to Patient Education Activity  Goal: Patient/Family Education  Outcome: Progressing Towards Goal

## 2020-07-23 LAB
ALBUMIN SERPL-MCNC: 3 G/DL (ref 3.5–5)
ALBUMIN/GLOB SERPL: 0.8 {RATIO} (ref 1.1–2.2)
ALP SERPL-CCNC: 50 U/L (ref 45–117)
ALT SERPL-CCNC: 25 U/L (ref 12–78)
ANION GAP SERPL CALC-SCNC: 5 MMOL/L (ref 5–15)
AST SERPL-CCNC: 22 U/L (ref 15–37)
BILIRUB SERPL-MCNC: 0.8 MG/DL (ref 0.2–1)
BNP SERPL-MCNC: 1930 PG/ML
BUN SERPL-MCNC: 22 MG/DL (ref 6–20)
BUN/CREAT SERPL: 19 (ref 12–20)
CALCIUM SERPL-MCNC: 8.7 MG/DL (ref 8.5–10.1)
CHLORIDE SERPL-SCNC: 104 MMOL/L (ref 97–108)
CO2 SERPL-SCNC: 30 MMOL/L (ref 21–32)
CREAT SERPL-MCNC: 1.16 MG/DL (ref 0.55–1.02)
GLOBULIN SER CALC-MCNC: 3.6 G/DL (ref 2–4)
GLUCOSE SERPL-MCNC: 94 MG/DL (ref 65–100)
POTASSIUM SERPL-SCNC: 4.5 MMOL/L (ref 3.5–5.1)
PROT SERPL-MCNC: 6.6 G/DL (ref 6.4–8.2)
SODIUM SERPL-SCNC: 139 MMOL/L (ref 136–145)

## 2020-07-23 PROCEDURE — 74011250637 HC RX REV CODE- 250/637: Performed by: INTERNAL MEDICINE

## 2020-07-23 PROCEDURE — 80053 COMPREHEN METABOLIC PANEL: CPT

## 2020-07-23 PROCEDURE — 97110 THERAPEUTIC EXERCISES: CPT

## 2020-07-23 PROCEDURE — 94760 N-INVAS EAR/PLS OXIMETRY 1: CPT

## 2020-07-23 PROCEDURE — 97535 SELF CARE MNGMENT TRAINING: CPT

## 2020-07-23 PROCEDURE — 83880 ASSAY OF NATRIURETIC PEPTIDE: CPT

## 2020-07-23 PROCEDURE — 74011250637 HC RX REV CODE- 250/637: Performed by: HOSPITALIST

## 2020-07-23 PROCEDURE — 97116 GAIT TRAINING THERAPY: CPT

## 2020-07-23 PROCEDURE — 65660000000 HC RM CCU STEPDOWN

## 2020-07-23 PROCEDURE — 36415 COLL VENOUS BLD VENIPUNCTURE: CPT

## 2020-07-23 RX ORDER — FUROSEMIDE 40 MG/1
40 TABLET ORAL DAILY
Status: DISCONTINUED | OUTPATIENT
Start: 2020-07-23 | End: 2020-07-24 | Stop reason: HOSPADM

## 2020-07-23 RX ADMIN — CARVEDILOL 12.5 MG: 12.5 TABLET, FILM COATED ORAL at 18:27

## 2020-07-23 RX ADMIN — CARVEDILOL 12.5 MG: 12.5 TABLET, FILM COATED ORAL at 08:38

## 2020-07-23 RX ADMIN — FUROSEMIDE 40 MG: 40 TABLET ORAL at 11:34

## 2020-07-23 RX ADMIN — SPIRONOLACTONE 25 MG: 25 TABLET ORAL at 08:38

## 2020-07-23 RX ADMIN — LOSARTAN POTASSIUM 100 MG: 50 TABLET, FILM COATED ORAL at 08:38

## 2020-07-23 RX ADMIN — RIVAROXABAN 15 MG: 15 TABLET, FILM COATED ORAL at 08:38

## 2020-07-23 NOTE — PROGRESS NOTES
Problem: Falls - Risk of  Goal: *Absence of Falls  Description: Document Anna Ruts Fall Risk and appropriate interventions in the flowsheet. Outcome: Progressing Towards Goal  Note: Fall Risk Interventions:  Mobility Interventions: Bed/chair exit alarm, Communicate number of staff needed for ambulation/transfer, Patient to call before getting OOB, Utilize walker, cane, or other assistive device    Mentation Interventions: Bed/chair exit alarm, Door open when patient unattended, Increase mobility, Room close to nurse's station, Toileting rounds, Update white board    Medication Interventions: Bed/chair exit alarm, Patient to call before getting OOB, Teach patient to arise slowly    Elimination Interventions: Bed/chair exit alarm, Call light in reach, Patient to call for help with toileting needs, Stay With Me (per policy), Toileting schedule/hourly rounds    History of Falls Interventions: Bed/chair exit alarm, Door open when patient unattended, Room close to nurse's station         Problem: Pressure Injury - Risk of  Goal: *Prevention of pressure injury  Description: Document Reji Scale and appropriate interventions in the flowsheet.   Outcome: Progressing Towards Goal  Note: Pressure Injury Interventions:       Moisture Interventions: Absorbent underpads, Apply protective barrier, creams and emollients, Offer toileting Q_hr    Activity Interventions: Increase time out of bed    Mobility Interventions: Pressure redistribution bed/mattress (bed type), Float heels    Nutrition Interventions: Document food/fluid/supplement intake    Friction and Shear Interventions: HOB 30 degrees or less, Minimize layers

## 2020-07-23 NOTE — PROGRESS NOTES
Problem: Falls - Risk of  Goal: *Absence of Falls  Description: Document F[place] Fall Risk and appropriate interventions in the flowsheet. Outcome: Progressing Towards Goal  Note: Fall Risk Interventions:  Mobility Interventions: Bed/chair exit alarm, Patient to call before getting OOB, Utilize walker, cane, or other assistive device    Mentation Interventions: Bed/chair exit alarm, Door open when patient unattended, Toileting rounds    Medication Interventions: Bed/chair exit alarm    Elimination Interventions: Bed/chair exit alarm, Call light in reach, Patient to call for help with toileting needs, Stay With Me (per policy), Toilet paper/wipes in reach    History of Falls Interventions: Bed/chair exit alarm, Door open when patient unattended, Room close to nurse's station         Problem: Pressure Injury - Risk of  Goal: *Prevention of pressure injury  Description: Document Reji Scale and appropriate interventions in the flowsheet.   Outcome: Progressing Towards Goal  Note: Pressure Injury Interventions:       Moisture Interventions: Apply protective barrier, creams and emollients, Check for incontinence Q2 hours and as needed, Moisture barrier, Minimize layers    Activity Interventions: Increase time out of bed    Mobility Interventions: HOB 30 degrees or less    Nutrition Interventions: Document food/fluid/supplement intake, Offer support with meals,snacks and hydration, Discuss nutritional consult with provider    Friction and Shear Interventions: Feet elevated on foot rest, Apply protective barrier, creams and emollients, Foam dressings/transparent film/skin sealants, Minimize layers

## 2020-07-23 NOTE — PROGRESS NOTES
Problem: Self Care Deficits Care Plan (Adult)  Goal: *Acute Goals and Plan of Care (Insert Text)  Description:   FUNCTIONAL STATUS PRIOR TO ADMISSION: Patient was modified independent using a walker PRN for functional mobility in the apartment and RW mostly out of apartment. Patient was modified independent for basic ADLs sitting to bath and dress. Moderate A instrumental ADLs - completes her own grocery and medication delivery. Plays bridge - walks down to attend. Family is very supportive. Patient is from [facility], visiting family in [place]. HOME SUPPORT: The patient lived alone with family to provide assistance PRN. Occupational Therapy Goals  Initiated 7/20/2020  1. Patient will perform lower body dressing with modified independence within 7 day(s). 2.  Patient will perform gathering ADLs waist height and above 4/4 with modified independence within 7 day(s). 3.  Patient will perform standing at sink to groom 2 mins with supervision/set-up within 7 day(s). 4.  Patient will perform toilet transfers with modified independence within 7 day(s). 5.  Patient will participate in upper extremity therapeutic exercise/activities with supervision/set-up within 7 day(s). 6.  Patient will utilize energy conservation techniques during functional activities with verbal cues within 7 day(s). Outcome: Progressing Towards Goal    OCCUPATIONAL THERAPY TREATMENT  Patient: Debra Memos (22 y.o. female)  Date: 7/23/2020  Diagnosis: [facility]congestive heart failure[facility] [I50.9]  Acute respiratory failure (HCC) [J96.00]   CHF (congestive heart failure) ([facility]       Precautions:    Chart, occupational therapy assessment, plan of care, and goals were reviewed. ASSESSMENT  Patient continues with skilled OT services and is progressing towards goals. Pt greeted sitting up in chair having finished breakfast and requesting to use commode before returning to bed 2/2 fatigue.  Pt performed ADL mobility with CGA and walker with min A 2/2 brief LOB x 1 when performing sri area hygiene. Max A for bowel hygiene overall. Transfer on/off BSC 2/2 pt reports of fatigue. Return to bed with min A for sit to supine. Pt will benefit from OT during hospital stay to facilitate engagement in ADLs and maximize functional outcomes. Recommend HHOT at d/c if pt is able to d/c home with family support. Current Level of Function Impacting Discharge (ADLs): up to max A for aspects of toileting, LB self-care    Other factors to consider for discharge: lives alone, but has family to assist if needed         PLAN :  Patient continues to benefit from skilled intervention to address the above impairments. Continue treatment per established plan of care. to address goals. Recommend with staff: encourage participation in ADLs, encourage pt to get up to chair for meals    Recommend next OT session: toileting, grooming in combination sitting/standing    Recommendation for discharge: (in order for the patient to meet his/her long term goals)  Occupational therapy at least 2 days/week in the home AND ensure assist and/or supervision for safety with functional mobility, basic ADLs, total A instrumental ADLs; assist with O2 line if discharges home with it. Patient is highly motivated to discharge to her home in [facility], she understands that she may need to discharge to local family's home first.     This discharge recommendation:  Has not yet been discussed the attending provider and/or case management    IF patient discharges home will need the following DME: AE: long handled bathing, AE: long handled dressing, and bedside commode       SUBJECTIVE:   Patient stated I need to use the bathroom.     OBJECTIVE DATA SUMMARY:   Cognitive/Behavioral Status:  Neurologic State: Confused;Drowsy  Orientation Level: Oriented to person;Oriented to place  Cognition: Follows commands             Functional Mobility and Transfers for ADLs:  Bed Mobility:   Sit to supine: min A    Transfers:  Sit to Stand: Contact guard assistance; Adaptive equipment; Additional time          Balance:  Sitting: Intact  Standing: Impaired  Standing - Static: Good;Constant support  Standing - Dynamic : Fair;Constant support    ADL Intervention:                           Lower Body Dressing Assistance  Socks: Moderate assistance    Toileting  Toileting Assistance: Maximum assistance  Bladder Hygiene: Minimum assistance  Bowel Hygiene: Maximum assistance  Clothing Management: Moderate assistance  Cues: Tactile cues provided;Verbal cues provided  Adaptive Equipment: (BSC)         Pain:  Not rated/reported    Activity Tolerance:   Fair  Please refer to the flowsheet for vital signs taken during this treatment. After treatment patient left in no apparent distress:   Supine in bed, Call bell within reach, Bed / chair alarm activated, and Side rails x 3    COMMUNICATION/COLLABORATION:   The patients plan of care was discussed with: Registered nurse.      Jayshree Kee OT  Time Calculation: 24 mins

## 2020-07-23 NOTE — PROGRESS NOTES
Cardiology Progress Note  2020     Admit Date: 2020  Admit Diagnosis: CHF (congestive heart failure) ([facility]) [I50.9]  Acute respiratory failure (HCC) [J96.00]  CC: none currently    Assessment:   Principal Problem:    CH[facility]congestive [facility] failure) ([facility] (2020)    Active Problems:    Acute respiratory failure ([facility] (2020)      Plan:     Started PO lasix  Cont other cardiac meds  Echo recently with normal LVEF  No further IP cardiac plans    Subjective:      Eugene Daniels has no c/o    Objective:    Physical Exam:  Overall VSSAF;    Visit Vitals  /67 (BP 1 Location: Right arm, BP Patient Position: At rest)   Pulse 72   Temp 97.4 °F (36.3 °C)   Resp 16   Ht 5' 3\" (1.6 m)   Wt 62.5 kg (137 lb 12.6 oz)   SpO2 94%   BMI 24.41 kg/m²     Temp (24hrs), Av °F (36.7 °C), Min:97.3 °F (36.3 °C), Max:98.5 °F (36.9 °C)    Patient Vitals for the past 8 hrs:   Pulse   20 1131 72   20 0830 83    Patient Vitals for the past 8 hrs:   Resp   20 1131 16   20 0830 16    Patient Vitals for the past 8 hrs:   BP   20 1131 114/67   20 0830 134/77       1901 -  0700  In: -   Out: 500 [Urine:500]      General Appearance: Well developed, well nourished, no acute distress. Ears/Nose/Mouth/Throat:   Normal MM; anicteric. JVP: WNL   Resp:   Improved   Cardiovascular:  Irreg   Abdomen:   Soft, non-tender, bowel sounds are present. Extremities: + edema bilaterally. Skin:  Neuro: Warm and dry.   A/O x3, grossly nonfocal                         Data Review:     Telemetry independently reviewed :   AFIB         Labs:   Recent Results (from the past 24 hour(s))   METABOLIC PANEL, COMPREHENSIVE    Collection Time: 20  5:02 AM   Result Value Ref Range    Sodium 139 136 - 145 mmol/L    Potassium 4.5 3.5 - 5.1 mmol/L    Chloride 104 97 - 108 mmol/L    CO2 30 21 - 32 mmol/L    Anion gap 5 5 - 15 mmol/L    Glucose 94 65 - 100 mg/dL    BUN 22 (H) 6 - 20 MG/DL Creatinine 1.16 (H) 0.55 - 1.02 MG/DL    BUN/Creatinine ratio 19 12 - 20      GFR est AA 52 (L) >60 ml/min/1.73m2    GFR est non-AA 43 (L) >60 ml/min/1.73m2    Calcium 8.7 8.5 - 10.1 MG/DL    Bilirubin, total 0.8 0.2 - 1.0 MG/DL    ALT (SGPT) 25 12 - 78 U/L    AST (SGOT) 22 15 - 37 U/L    Alk.  phosphatase 50 45 - 117 U/L    Protein, total 6.6 6.4 - 8.2 g/dL    Albumin 3.0 (L) 3.5 - 5.0 g/dL    Globulin 3.6 2.0 - 4.0 g/dL    A-G Ratio 0.8 (L) 1.1 - 2.2     NT-PRO BNP    Collection Time: 07/23/20  5:02 AM   Result Value Ref Range    NT pro-BNP 1,930 (H) <450 PG/ML      Current medications reviewed       Brandie Sanchez MD

## 2020-07-23 NOTE — PROGRESS NOTES
7/23/2020 -   KELLY:  - RUR: 10%  - Likely disposition is for discharge to daughter's home with transport via daughter  - Family is requesting a new PCP with [facility]  - HH setup will be difficult until AFTER patient establishes with new PCP due to [facility] requirement of having a community based physician following the patient  - Patient may be ready for discharge tomorrow, 7/24    - O2 is being weaned    12:25 -   CM discussed patient's [facility] plan with [facility]. Due to patient currently not being established with a local PCP, [facility] orders will need to be held until initial PCP visit. A local provider must be following the patient for ongoing orders. CM contacted patient's daughter Amadou Cui to discuss the above. Daughter requested to have a [facility] PCP setup. CM submitted scheduling request via email. Medicare pt has received, reviewed, and signed 2nd IM letter informing them of their right to appeal the discharge. Signed copy has been placed on pt bedside chart.     CRM: Merlin Mantis, MPH, [address] [place] [address]; Z: [phone]

## 2020-07-23 NOTE — PROGRESS NOTES
Problem: Mobility Impaired (Adult and Pediatric)  Goal: *Acute Goals and Plan of Care (Insert Text)  Description: FUNCTIONAL STATUS PRIOR TO ADMISSION: Pt lived at an independent living facility. She was independent with mobility and ADLs, used a RW as needed     HOME SUPPORT PRIOR TO ADMISSION: Pt lives alone in [place], does not drive, daughter lives in [place] and is involved in her care    Physical Therapy Goals  Initiated 7/20/2020  1. Patient will move from supine to sit and sit to supine , scoot up and down, and roll side to side in bed with independence within 7 day(s). 2.  Patient will transfer from bed to chair and chair to bed with modified independence using the least restrictive device within 7 day(s). 3.  Patient will perform sit to stand with modified independence within 7 day(s). 4.  Patient will ambulate with contact guard assist for 50 feet with the least restrictive device within 7 day(s). 5.  Patient will ascend/descend 4 stairs with single handrail(s) with minimal assistance/contact guard assist within 7 day(s). Outcome: Progressing Towards Goal   PHYSICAL THERAPY TREATMENT  Patient: Marj Shankar (76 y.o. female)  Date: 7/23/2020  Diagnosis: CHF (congestive heart failure) ([facility] [I50.9]  Acute respiratory failure (HCC) [J96.00]   [facility]       Precautions:    Chart, physical therapy assessment, plan of care and goals were reviewed. ASSESSMENT  Patient continues with skilled PT services and is progressing towards goals. Pt was received asleep on 2 liters of 02 and resting 02 sats at 98%. Transitioned her to room air and at rest 02 sats on room air 92%. She amb on room air and her lowest 02 sat was briefly to 88% which quickly keke to 90% with a few good pursed lip breaths. Gait was stable with walker support. Pt remained up to the chair post session.  Pt clarified for me her social situation as follows: she resides in [place] in an independent living setting (and in the recent past had lived in [place]). She is here visiting her daughter who lives in an apartment with elevator access. Pt remains on target for discharge to the home of said daughter. Current Level of Function Impacting Discharge (mobility/balance): provided contact guard at most.    Other factors to consider for discharge: Does not use supplemental 02 at baseline but has been using some here. PLAN :  Patient continues to benefit from skilled intervention to address the above impairments. Continue treatment per established plan of care. to address goals. Recommendation for discharge: (in order for the patient to meet his/her long term goals)  Physical therapy at least 2 days/week in the home AND ensure assist and/or supervision for safety with mobility. This discharge recommendation:  A follow-up discussion with the attending provider and/or case management is planned    IF patient discharges home will need the following DME: none       SUBJECTIVE:   Patient stated I want to brush my hair.     OBJECTIVE DATA SUMMARY:   Chart checked, pt cleared by nursing  Critical Behavior:  Neurologic State: received sleeping then quickly woke up and remained alert. Orientation Level: Disoriented to situation, Oriented to person, Oriented to place, Oriented to time  Cognition: Follows commands  Safety/Judgement: Awareness of environment, Good awareness of safety precautions  Functional Mobility Training:  Bed Mobility:     Supine to Sit: Supervision              Transfers:  Sit to Stand: Contact guard assistance  Stand to Sit: Contact guard assistance                             Balance:  Sitting: Intact; Without support  Standing: Impaired  Standing - Static: Constant support;Good  Standing - Dynamic : Constant support;Good  Ambulation/Gait Training:  Distance (ft): 60 Feet (ft)  Assistive Device: Gait belt;Walker, rolling  Ambulation - Level of Assistance: Contact guard assistance Gait Abnormalities: Decreased step clearance        Base of Support: Narrowed     Speed/Bethany: Slow  Step Length: Left shortened;Right shortened                    Stairs: Therapeutic Exercises:   Pursed lip breathing, and 10 reps of :seated marching and LAQs combined with ankle pumps  Pain Rating:  None rated    Activity Tolerance:   Good, SpO2 stable on RA, and see assessment  Please refer to the flowsheet for vital signs taken during this treatment. After treatment patient left in no apparent distress:   Sitting in chair, Call bell within reach, and Bed / chair alarm activated    COMMUNICATION/COLLABORATION:   The patients plan of care was discussed with: Registered nurse.      Ernie Olmedo   Time Calculation: 26 mins

## 2020-07-23 NOTE — PROGRESS NOTES
Bedside shift change report given to Dano Box RN (oncoming nurse) by Luan Garcia RN (offgoing nurse). Report included the following information SBAR, Kardex, MAR, Recent Results and Cardiac Rhythm Afib.

## 2020-07-23 NOTE — PROGRESS NOTES
[facility] Adult  Hospitalist Group                                                                                          Hospitalist Progress Note  Jaci Bang MD  Answering service: [phone] from in house phone        Date of Service:  2020  NAME:  Maria De Jesus Jean  :  1923  MRN:  [id number]      Admission Summary:   Patient was brought to the ED due to worsening dyspnea. She was diagnosed with pneumonia last Friday and is on antibiotics. She was also treated for presumed pneumonia several weeks ago in [place]. Patient lives in [place] was brought to live with daughter last week. Her significant medical history includes atrial fibrillation congestive heart failure. With her ongoing symptoms of dyspnea and shortness of breath, her doctors (PCP and cardiologist) were increasing her diuretic and carvedilol. Interval history / Subjective:   Patient is a poor historian, feels fairly well, says she slept ok, still on 2L NC. Will try to wean off -keep sats >90%. Assessment & Plan:     Acute hypoxic respiratory failure likely due to acute diastolic CHF , NYHA IV  -Responded to diuretics but creatinine up and lasix iv held on   -continue coreg, losartan, Spironolactone 25 mg daily initiated  -monitor electrolyte on IV diuretics, daily I/O and weight  -Given recent echocardiogram in May, 2020 no need of echo per cardiology  -As per daughter report patient had an echocardiogram back in May 11, 2020 in [place] that showed ejection fraction of 60 to 65%.  Patient has known diastolic dysfunction.  -Wean off O2 as tolerated. No home O2 at baseline.  -Cardiology on board. Appreciate recommendations. -SARS-CoV-2 result negative- PT/OT eval- home health for dc.     Chronic atrial fibrillation, Rate controlled-Continue carvedilol and Xarelto.     HTN -Continue carvedilol, losartan, lasix, spironolactone, monitor BP  Dementia- not on medications at home.   Patient is alert and oriented.     Code status: Full Code  DVT prophylaxis: Xarelto  Care Plan discussed with: Patient/Family and Nurse  Anticipated Disposition: Home with Jono Rosario  Anticipated Discharge: 24 hours to 48 hours     Hospital Problems  Date Reviewed: 7/19/2020          Codes Class Noted POA    Acute respiratory failure ([facility] ICD-10-CM: J96.00  ICD-9-CM: 518.81  7/22/2020 Unknown        * (Principal) CHF (congestive heart failure) ([facility] ICD-10-CM: I50.9  ICD-9-CM: 428.0  7/19/2020 Unknown              Vital Signs:    Last 24hrs VS reviewed since prior progress note. Most recent are:  Visit Vitals  /77 (BP 1 Location: Left arm, BP Patient Position: Sitting)   Pulse 83   Temp 98 °F (36.7 °C)   Resp 16   Ht 5' 3\" (1.6 m)   Wt 62.5 kg (137 lb 12.6 oz)   SpO2 92%   BMI 24.41 kg/m²         Intake/Output Summary (Last 24 hours) at 7/23/2020 0848  Last data filed at 7/22/2020 1736  Gross per 24 hour   Intake    Output 500 ml   Net -500 ml        Physical Examination:     Constitutional:  No acute distress, cooperative, pleasant        Resp:  No wheezing, few creps R side. No accessory muscle use   CV:  Regular rhythm, normal rate, no murmurs    GI:  Soft, non distended, non tender. Musculoskeletal:  No edema     Neurologic:  Moves all extremities.   AAOx3     Skin:  Good turgor, no rashes or ulcers       Data Review:    Review and/or order of clinical lab test  Review and/or order of tests in the radiology section of CPT  Review and/or order of tests in the medicine section of CPT      Labs:     Recent Labs     07/22/20  0300   WBC 11.1*   HGB 13.1   HCT 40.6        Recent Labs     07/23/20  0502 07/22/20  0300 07/21/20  0447    137 139   K 4.5 4.3 3.7    101 102   CO2 30 29 34*   BUN 22* 24* 14   CREA 1.16* 1.31* 1.01   GLU 94 95 89   CA 8.7 9.0 8.5   MG  --  2.2  --      Recent Labs     07/23/20  0502   ALT 25   AP 50   TBILI 0.8   TP 6.6   ALB 3.0*   GLOB 3.6     No results for input(s): INR, PTP, APTT, INREXT, INREXT in the last 72 hours. No results for input(s): FE, TIBC, PSAT, FERR in the last 72 hours. No results found for: FOL, RBCF   No results for input(s): PH, PCO2, PO2 in the last 72 hours. No results for input(s): CPK, CKNDX, TROIQ in the last 72 hours.     No lab exists for component: CPKMB  No results found for: CHOL, CHOLX, CHLST, CHOLV, HDL, HDLP, LDL, LDLC, DLDLP, TGLX, TRIGL, TRIGP, CHHD, CHHDX  Lab Results   Component Value Date/Time    Glucose (POC) 137 (H) 07/21/2020 06:32 PM     Lab Results   Component Value Date/Time    Color YELLOW/STRAW 07/19/2020 06:59 PM    Appearance CLEAR 07/19/2020 06:59 PM    Specific gravity 1.010 07/19/2020 06:59 PM    pH (UA) 6.0 07/19/2020 06:59 PM    Protein Negative 07/19/2020 06:59 PM    Glucose Negative 07/19/2020 06:59 PM    Ketone Negative 07/19/2020 06:59 PM    Bilirubin Negative 07/19/2020 06:59 PM    Urobilinogen 1.0 07/19/2020 06:59 PM    Nitrites Negative 07/19/2020 06:59 PM    Leukocyte Esterase MODERATE (A) 07/19/2020 06:59 PM    Epithelial cells MODERATE (A) 07/19/2020 06:59 PM    Bacteria Negative 07/19/2020 06:59 PM    WBC 0-4 07/19/2020 06:59 PM    RBC 0-5 07/19/2020 06:59 PM         Medications Reviewed:     Current Facility-Administered Medications   Medication Dose Route Frequency    carvediloL (COREG) tablet 12.5 mg  12.5 mg Oral BID    losartan (COZAAR) tablet 100 mg  100 mg Oral DAILY    hydrALAZINE (APRESOLINE) 20 mg/mL injection 10 mg  10 mg IntraVENous Q6H PRN    rivaroxaban (XARELTO) tablet 15 mg  15 mg Oral DAILY    spironolactone (ALDACTONE) tablet 25 mg  25 mg Oral DAILY     ______________________________________________________________________  EXPECTED LENGTH OF STAY: 3d 14h  ACTUAL LENGTH OF STAY:          1                 Bretta Sandhoff, MD

## 2020-07-24 ENCOUNTER — HOME HEALTH ADMISSION (OUTPATIENT)
Dept: HOME HEALTH SERVICES | Facility: HOME HEALTH | Age: 85
End: 2020-07-24
Payer: MEDICARE

## 2020-07-24 VITALS
WEIGHT: 132.94 LBS | BODY MASS INDEX: 23.55 KG/M2 | DIASTOLIC BLOOD PRESSURE: 96 MMHG | TEMPERATURE: 97.7 F | HEART RATE: 77 BPM | OXYGEN SATURATION: 93 % | RESPIRATION RATE: 18 BRPM | SYSTOLIC BLOOD PRESSURE: 170 MMHG | HEIGHT: 63 IN

## 2020-07-24 PROCEDURE — 74011250637 HC RX REV CODE- 250/637: Performed by: INTERNAL MEDICINE

## 2020-07-24 PROCEDURE — 97116 GAIT TRAINING THERAPY: CPT

## 2020-07-24 PROCEDURE — 74011250637 HC RX REV CODE- 250/637: Performed by: HOSPITALIST

## 2020-07-24 RX ORDER — SPIRONOLACTONE 25 MG/1
25 TABLET ORAL DAILY
Qty: 30 TAB | Refills: 0 | Status: SHIPPED | OUTPATIENT
Start: 2020-07-24 | End: 2020-08-10 | Stop reason: SDUPTHER

## 2020-07-24 RX ADMIN — FUROSEMIDE 40 MG: 40 TABLET ORAL at 08:09

## 2020-07-24 RX ADMIN — CARVEDILOL 12.5 MG: 12.5 TABLET, FILM COATED ORAL at 08:09

## 2020-07-24 RX ADMIN — SPIRONOLACTONE 25 MG: 25 TABLET ORAL at 08:10

## 2020-07-24 RX ADMIN — LOSARTAN POTASSIUM 100 MG: 50 TABLET, FILM COATED ORAL at 08:09

## 2020-07-24 RX ADMIN — RIVAROXABAN 15 MG: 15 TABLET, FILM COATED ORAL at 10:45

## 2020-07-24 NOTE — NURSE NAVIGATOR
[facility] called for follow up appointment. Waiting return call. Follow up with Dr. Cara Dc on 7/30/20 at 11:30 am.  Information on After Visit Summary.

## 2020-07-24 NOTE — DISCHARGE INSTRUCTIONS
Discharge Instructions       PATIENT ID: Satish Rajan  MRN: [id number]   YOB: 1923    DATE OF ADMISSION: 7/19/2020  5:34 PM    DATE OF DISCHARGE: 7/24/2020    PRIMARY CARE PROVIDER: None     ATTENDING PHYSICIAN: Heather Casillas MD  DISCHARGING PROVIDER: Yaquelin Burks MD    To contact this individual call [phone] and ask the  to page. If unavailable ask to be transferred the Adult Hospitalist Department. DISCHARGE DIAGNOSES Acute on chronic diastolic CHF with respiratory failure    CONSULTATIONS: IP CONSULT TO CARDIOLOGY    PROCEDURES/SURGERIES: * No surgery found *    FOLLOW UP APPOINTMENTS:   Follow-up Information     Follow up With Specialties Details Why Contact Info    Merlyn Fuchs MD Internal Medicine On 7/29/2020 Hospital follow up PCP appointment Wednesday, 7/29/20 @ 12:15 p.m. Please arrive 15 minutes early with photo ID, insurance card and a listing of all medications. Please wear face covering. [facility]  [phone]      None  In 1 week  None (395) Patient stated that they have no PCP        In 4 weeks  Cardiology           ADDITIONAL CARE RECOMMENDATIONS: follow up with PCP and Cardiology    DIET: Resume previous diet    DISCHARGE MEDICATIONS:  Added spironolactone    · It is important that you take the medication exactly as they are prescribed. · Keep your medication in the bottles provided by the pharmacist and keep a list of the medication names, dosages, and times to be taken in your wallet. · Do not take other medications without consulting your doctor. NOTIFY YOUR PHYSICIAN FOR ANY OF THE FOLLOWING:   Fever over 101 degrees for 24 hours. Chest pain, shortness of breath, fever, chills, nausea, vomiting, diarrhea, change in mentation, falling, weakness, bleeding. Severe pain or pain not relieved by medications. Or, any other signs or symptoms that you may have questions about.   It was our pleasure to help take care of you and we hope you get well very soon.     DISPOSITION:    Home With:   OT  PT  HH  RN       SNF/Inpatient Rehab/LTAC   x Independent/assisted living    Hospice    Other:     CDMP Checked:   Yes x     PROBLEM LIST Updated:  Yes x     Signed:   Yamilet Nur MD  7/24/2020  7:35 AM

## 2020-07-24 NOTE — PROGRESS NOTES
Problem: Falls - Risk of  Goal: *Absence of Falls  Description: Document Isha Nara Fall Risk and appropriate interventions in the flowsheet. Outcome: Progressing Towards Goal  Note: Fall Risk Interventions:  Mobility Interventions: Bed/chair exit alarm, Patient to call before getting OOB, Utilize walker, cane, or other assistive device    Mentation Interventions: Bed/chair exit alarm, Door open when patient unattended, Reorient patient, Toileting rounds, Room close to nurse's station, Update white board    Medication Interventions: Bed/chair exit alarm, Patient to call before getting OOB, Teach patient to arise slowly    Elimination Interventions: Bed/chair exit alarm, Call light in reach, Stay With Me (per policy), Toileting schedule/hourly rounds, Patient to call for help with toileting needs    History of Falls Interventions: Bed/chair exit alarm, Door open when patient unattended, Room close to nurse's station         Problem: Discharge Planning  Goal: *Discharge to safe environment  Outcome: Progressing Towards Goal     Problem: Pressure Injury - Risk of  Goal: *Prevention of pressure injury  Description: Document Reji Scale and appropriate interventions in the flowsheet.   Outcome: Progressing Towards Goal  Note: Pressure Injury Interventions:       Moisture Interventions: Absorbent underpads, Offer toileting Q_hr, Minimize layers    Activity Interventions: Increase time out of bed, Pressure redistribution bed/mattress(bed type)    Mobility Interventions: Pressure redistribution bed/mattress (bed type)    Nutrition Interventions: Document food/fluid/supplement intake    Friction and Shear Interventions: Minimize layers

## 2020-07-24 NOTE — DISCHARGE SUMMARY
Discharge Summary       PATIENT ID: River Guerra  MRN: [id number]   YOB: 1923    DATE OF ADMISSION: 7/19/2020  5:34 PM    DATE OF DISCHARGE: 7/24/2020   PRIMARY CARE PROVIDER: None     ATTENDING PHYSICIAN: Tay Jewell MD  DISCHARGING PROVIDER: Tay Jewell MD    To contact this individual call [phone] and ask the  to page. If unavailable ask to be transferred the Adult Hospitalist Department. CONSULTATIONS: IP CONSULT TO CARDIOLOGY    PROCEDURES/SURGERIES: * No surgery found *    ADMITTING DIAGNOSES & HOSPITAL COURSE:   Treated for respiratory failure caused by acute on chronic diastolic CHF. Made good recovery, evaluated by PT/OT. Dc with home health. F/u with PCP and cardiology    Risk of Re-Admission: high  DISCHARGE DIAGNOSES / PLAN:      acute on chronic diastolic CHF , NYHA IV on admission, NYHA class 3 on discharge. Acute hypoxic respiratory failure likely due to above- resolved. -SARS-CoV-2 test negative  -Responded to diuretics but creatinine up and lasix iv held on 7/22  -continue coreg, losartan, Spironolactone 25 mg daily initiated  -monitor electrolyte on IV diuretics, daily I/O and weight  -Given recent echocardiogram in May, 2020 no need of echo per cardiology  -As per daughter report patient had an echocardiogram back in May 11, 2020 in [place] that showed ejection fraction of 60 to 65%.  Patient has known diastolic dysfunction.  -Weaned off O2 as tolerated.  No home O2 at baseline.  -Cardiology on board.  Appreciate recommendations.  - PT/OT eval- home health for dc. F/u with PCP and cardiology     Chronic atrial fibrillation, Rate controlled-Continue carvedilol and Xarelto.    HTN -Continue carvedilol, losartan, lasix, spironolactone, monitor BP  Dementia- not on medications at home.   Patient is alert and partially oriented.      FOLLOW UP APPOINTMENTS:    Follow-up Information     Follow up With Specialties Details Why Hector Rose MD Internal Medicine On 7/29/2020 Hospital follow up PCP appointment Wednesday, 7/29/20 @ 12:15 p.m. Please arrive 15 minutes early with photo ID, insurance card and a listing of all medications. Please wear face covering. [facility]  [phone]      None  In 1 week  None (395) Patient stated that they have no PCP        In 4 weeks  Cardiology         ADDITIONAL CARE RECOMMENDATIONS:  Follow up with PCP and Cardiology    DIET: Resume previous diet    ACTIVITY: Activity as tolerated    DISCHARGE MEDICATIONS:  Current Discharge Medication List      START taking these medications    Details   spironolactone (ALDACTONE) 25 mg tablet Take 1 Tab by mouth daily for 30 days. Qty: 30 Tab, Refills: 0         CONTINUE these medications which have NOT CHANGED    Details   irbesartan (AVAPRO) 300 mg tablet Take 300 mg by mouth daily. carvediloL (COREG) 12.5 mg tablet Take 12.5 mg by mouth two (2) times a day. rivaroxaban (Xarelto) 15 mg tab tablet Take 15 mg by mouth daily. furosemide (Lasix) 40 mg tablet Take 40 mg by mouth daily. NOTIFY YOUR PHYSICIAN FOR ANY OF THE FOLLOWING:   Fever over 101 degrees for 24 hours. Chest pain, shortness of breath, fever, chills, nausea, vomiting, diarrhea, change in mentation, falling, weakness, bleeding. Severe pain or pain not relieved by medications. Or, any other signs or symptoms that you may have questions about.     DISPOSITION:    Home With:   OT  PT  HH  RN       Long term SNF/Inpatient Rehab   x Independent/assisted living    Hospice    Other:     PATIENT CONDITION AT DISCHARGE:     Functional status    Poor     Deconditioned     Independent      Cognition     Lucid     Forgetful     Dementia      Catheters/lines (plus indication)    [place]     PICC     PEG     None      Code status     Full code     DNR      PHYSICAL EXAMINATION AT DISCHARGE:  Patient seen and examined at bedside, Condition stable, explained discharge and follow up plans. /73   Pulse 77   Temp 97.6 °F (36.4 °C)   Resp 18   Ht 5' 3\" (1.6 m)   Wt 60.3 kg (132 lb 15 oz)   SpO2 92%   BMI 23.55 kg/m²   General:  Alert, oriented, No acute distress. Frail, elderly. Pleasant. Resp:  No accessory muscle use, Good AE. Neuro:  Grossly normal, no focal neuro deficits, follows commands     CHRONIC MEDICAL DIAGNOSES:  Problem List as of 7/24/2020 Date Reviewed: 7/19/2020          Codes Class Noted - Resolved    Acute respiratory failure ([facility] ICD-10-CM: J96.00  ICD-9-CM: 518.81  7/22/2020 - Present        * (Principal) CHF (congestive heart failure) ([facility] ICD-10-CM: I50.9  ICD-9-CM: 428.0  7/19/2020 - Present              37 minutes were spent with the patient on counseling and coordination of care.     Signed:   Luke Crzu MD  7/24/2020  7:36 AM

## 2020-07-24 NOTE — PROGRESS NOTES
Hospital follow-up visit has been scheduled with [facility] for Sunday, 7/26/20. Office will contact patient's daughter Surekha Madrid with arrival time.   David Rodriguez, Care Management Specialist.

## 2020-07-24 NOTE — PROGRESS NOTES
7/24/2020 -   KELLY:  - RUR: 10%  - Patient has been cleared for discharge today, 7/24  - Disposition is for discharge to daughter's home with transport via daughter  - Daughter to be on site at around 11:00; nursing aware of the above  - New patient appointment scheduled for 7/29 at 15:00 with Dr. Dottie Floyd; info is on AVS  - Family is in agreement to [facility] visit over the weekend  - CM submitted update to [facility] via [address] requesting for agency to follow up with Dr. Dottie Floyd on 7/29 to see patient for [facility] 7/30  - Daughter is aware of and in agreement to the above [facility] plan and [facility] contact information is on AVS  - 2nd IM Letter was discussed with daughter 7/23  CRM: Ty Bajwa, MPH, CHES; Z: [phone]    09:30 -   CM asked cardio if they would be willing to follow patient for [facility] orders until 7/29 new PCP appointment. CM waiting for response. CRM: Ty Bajwa, MPH, CHES; Z: [phone]    11:00 -   CM is continuing to follow potential [facility] setup with [facility] with cardio following until 7/29 PCP appointment post patient's discharge. CRM: Ty Bajwa, MPH, CHES; Z: [phone]    12:30 -   CM received return call from [facility] indicating that agency can accept the patient with plan for [facility] this weekend. Cardio, Dr. Mone Kendrick will follow patient for orders until 7/29 new PCP appointment with Dr. Dottie Floyd.   Patient's daughter is aware of:  [facility] for [address] [facility] on 7/26  New PCP on 7/29  Cardio on 7/30  CRM: Ty Bajwa, MPH,  Tamika German; Z: [phone]

## 2020-07-24 NOTE — PROGRESS NOTES
Problem: Mobility Impaired (Adult and Pediatric)  Goal: *Acute Goals and Plan of Care (Insert Text)  Description: FUNCTIONAL STATUS PRIOR TO ADMISSION: Pt lived at an independent living facility. She was independent with mobility and ADLs, used a RW as needed     HOME SUPPORT PRIOR TO ADMISSION: Pt lives alone in [place], does not drive, daughter lives in [place] and is involved in her care    Physical Therapy Goals  Initiated 7/20/2020  1. Patient will move from supine to sit and sit to supine , scoot up and down, and roll side to side in bed with independence within 7 day(s). 2.  Patient will transfer from bed to chair and chair to bed with modified independence using the least restrictive device within 7 day(s). 3.  Patient will perform sit to stand with modified independence within 7 day(s). 4.  Patient will ambulate with contact guard assist for 50 feet with the least restrictive device within 7 day(s). 5.  Patient will ascend/descend 4 stairs with single handrail(s) with minimal assistance/contact guard assist within 7 day(s). Outcome: Progressing Towards Goal   PHYSICAL THERAPY TREATMENT including an attempt at a six minute walk test  Patient: Angela Spencer (23 y.o. female)  Date: 7/24/2020  Diagnosis: CHF (congestive heart failure) ([facility] [I50.9]  Acute respiratory failure (H[facility]) [J96.00]   CHF (congestive heart failure) ([facility]       Precautions:  fall  Chart, physical therapy assessment, plan of care and goals were reviewed. ASSESSMENT  Patient continues with skilled PT services and is progressing towards goals. Today pt was received up to the chair agreeable to PT and a six minute walk test (per heart failure protocol). As we progressed to right before the test she became very quiet and reported that she was OK and would try the test, see subjective. She opted to not complete the test, requested to turn around before she even walked as far as the door to her room.  When asked why she had wanted to turn around at that point this 80year old lady said that she just did not know. She appeared overwhelmed to me. Once she was seated back in her chair she was very chatty. Her 02 sats today ranged even at rest, 87-95% very quickly fluctuating not requiring any supplemental 02 to manage. Above discussed with her nurse, I suggested keeping an eye on pt's 02 sats on room air, she has a discharge order for today. .     Current Level of Function Impacting Discharge (mobility/balance): just contact guard    Other factors to consider for discharge: age of 80         PLAN :  Patient continues to benefit from skilled intervention to address the above impairments. Continue treatment per established plan of care. to address goals. Recommendation for discharge: (in order for the patient to meet his/her long term goals)  Physical therapy at least 2 days/week in the home AND ensure assist and/or supervision for safety with mobility    This discharge recommendation:  A follow-up discussion with the attending provider and/or case management is planned    IF patient discharges home will need the following DME: none       SUBJECTIVE:   Patient stated I'm whelmed, when I asked pt if she felt overwhelmed. OBJECTIVE DATA SUMMARY:   Chart checked, pt cleared by nursing  Critical Behavior:  Neurologic State: Alert,   Orientation Level: Disoriented to situation, Oriented to time, Oriented to place, Oriented to person  Cognition: Follows commands  Safety/Judgement: Awareness of environment, Good awareness of safety precautions  Functional Mobility Training:  Bed Mobility:         Not assessed           Transfers:  Sit to Stand: Contact guard assistance  Stand to Sit: Contact guard assistance                             Balance:  Sitting: Intact; Without support  Standing: Impaired  Standing - Static: Constant support;Good  Standing - Dynamic : Constant support;Good  Ambulation/Gait Training:  Distance (ft): 25 Feet (ft)  Assistive Device: Gait belt;Walker, rolling  Ambulation - Level of Assistance: Contact guard assistance        Gait Abnormalities: Decreased step clearance        Base of Support: Narrowed     Speed/Bethany: Slow  Step Length: Left shortened;Right shortened                  6 MWT results:   Distance Walked in Feet (ft): 25 ft.(in 1 minute and 27 seconds on room air)    Pre Heart Rate: 75    Pre O2 Saturation: 90        Post Heart Rate: 79    Post O2 Saturation: 88    Assistive device used: Assistive Device: Gait belt;Walker, rolling        Normative data:   Men 39-80 years old = 1889 feet; Women 3680 years lba=0106 feet  Modified 10 point Amy RPE scale utilized:  0 = no breathlessness at all ---> 10 = maximum exertion  Please refer to the flowsheet for any additional vital signs taken during this treatment. Stairs: Therapeutic Exercises:   Pursed lip breathing   Pain Rating:  None rated    Activity Tolerance:   See assessment above  Please refer to the flowsheet for vital signs taken during this treatment. After treatment patient left in no apparent distress:   Sitting in chair, Call bell within reach, and Bed / chair alarm activated    COMMUNICATION/COLLABORATION:   The patients plan of care was discussed with: Registered nursePorfirio Khoury   Time Calculation: 19 mins

## 2020-07-24 NOTE — PROGRESS NOTES
Problem: Falls - Risk of  Goal: *Absence of Falls  Description: Document Estefani Cintron Fall Risk and appropriate interventions in the flowsheet. Outcome: Progressing Towards Goal  Note: Fall Risk Interventions:  Mobility Interventions: Bed/chair exit alarm, Communicate number of staff needed for ambulation/transfer, Patient to call before getting OOB, Utilize walker, cane, or other assistive device    Mentation Interventions: Bed/chair exit alarm, Door open when patient unattended, Increase mobility, Room close to nurse's station, Toileting rounds, Update white board    Medication Interventions: Bed/chair exit alarm, Patient to call before getting OOB, Teach patient to arise slowly    Elimination Interventions: Bed/chair exit alarm, Call light in reach, Patient to call for help with toileting needs, Stay With Me (per policy), Toileting schedule/hourly rounds    History of Falls Interventions: Bed/chair exit alarm, Door open when patient unattended, Room close to nurse's station         Problem: Pressure Injury - Risk of  Goal: *Prevention of pressure injury  Description: Document Reji Scale and appropriate interventions in the flowsheet.   Outcome: Progressing Towards Goal  Note: Pressure Injury Interventions:       Moisture Interventions: Absorbent underpads, Apply protective barrier, creams and emollients, Offer toileting Q_hr    Activity Interventions: Increase time out of bed    Mobility Interventions: Pressure redistribution bed/mattress (bed type), Float heels    Nutrition Interventions: Document food/fluid/supplement intake    Friction and Shear Interventions: HOB 30 degrees or less, Minimize layers

## 2020-07-25 ENCOUNTER — PATIENT OUTREACH (OUTPATIENT)
Dept: CASE MANAGEMENT | Age: 85
End: 2020-07-25

## 2020-07-25 ENCOUNTER — HOME CARE VISIT (OUTPATIENT)
Dept: SCHEDULING | Facility: HOME HEALTH | Age: 85
End: 2020-07-25
Payer: MEDICARE

## 2020-07-25 VITALS
WEIGHT: 132 LBS | OXYGEN SATURATION: 98 % | RESPIRATION RATE: 18 BRPM | TEMPERATURE: 97.9 F | BODY MASS INDEX: 21.21 KG/M2 | SYSTOLIC BLOOD PRESSURE: 126 MMHG | HEIGHT: 66 IN | DIASTOLIC BLOOD PRESSURE: 70 MMHG | HEART RATE: 76 BPM

## 2020-07-25 PROCEDURE — 400013 HH SOC

## 2020-07-25 PROCEDURE — G0299 HHS/HOSPICE OF RN EA 15 MIN: HCPCS

## 2020-07-25 PROCEDURE — 3331090002 HH PPS REVENUE DEBIT

## 2020-07-25 PROCEDURE — 3331090001 HH PPS REVENUE CREDIT

## 2020-07-25 NOTE — PROGRESS NOTES
Patient contacted regarding recent discharge and COVID-19 risk. Discussed COVID-19 related testing which was available at this time. Test results were negative. Patient informed of results, if available? yes    Care Transition Nurse/ Ambulatory Care Manager contacted the family by telephone to perform post discharge assessment. Verified name and  with family as identifiers. Patient has following risk factors of: heart failure, acute respiratory failure and HTN. CTN/ACM reviewed discharge instructions, medical action plan and red flags related to discharge diagnosis. Reviewed and educated them on any new and changed medications related to discharge diagnosis. Advised obtaining a 90-day supply of all daily and as-needed medications. Advance Care Planning:   Does patient have an Advance Directive: decision makers updated     Education provided regarding infection prevention, and signs and symptoms of COVID-19 and when to seek medical attention with family who verbalized understanding. Discussed exposure protocols and quarantine from [address] you at higher risk for severe illness  and given an opportunity for questions and concerns. The family agrees to contact the COVID-19 hotline [phone] or PCP office for questions related to their healthcare. CTN/ACM provided contact information for future reference. From CDC: Are you at higher risk for severe illness?  Wash your hands often.  Avoid close contact (6 feet, which is about two arm lengths) with people who are sick.  Put distance between yourself and other people if COVID-19 is spreading in your community.  Clean and disinfect frequently touched surfaces.  Avoid all cruise travel and non-essential air travel.  Call your healthcare professional if you have concerns about COVID-19 and your underlying condition or if you are sick.     For more information on steps you can take to protect yourself, see CDC's How to Protect Yourself      Patient/family/caregiver given information for [employer] [facility] and agrees to enroll no    Plan for follow-up call in 7-14 days based on severity of symptoms and risk factors.

## 2020-07-26 PROCEDURE — 3331090002 HH PPS REVENUE DEBIT

## 2020-07-26 PROCEDURE — 3331090001 HH PPS REVENUE CREDIT

## 2020-07-27 ENCOUNTER — HOME CARE VISIT (OUTPATIENT)
Dept: SCHEDULING | Facility: HOME HEALTH | Age: 85
End: 2020-07-27
Payer: MEDICARE

## 2020-07-27 ENCOUNTER — HOME CARE VISIT (OUTPATIENT)
Dept: HOME HEALTH SERVICES | Facility: HOME HEALTH | Age: 85
End: 2020-07-27
Payer: MEDICARE

## 2020-07-27 PROCEDURE — G0151 HHCP-SERV OF PT,EA 15 MIN: HCPCS

## 2020-07-27 PROCEDURE — 3331090001 HH PPS REVENUE CREDIT

## 2020-07-27 PROCEDURE — 3331090002 HH PPS REVENUE DEBIT

## 2020-07-28 ENCOUNTER — HOME CARE VISIT (OUTPATIENT)
Dept: SCHEDULING | Facility: HOME HEALTH | Age: 85
End: 2020-07-28
Payer: MEDICARE

## 2020-07-28 ENCOUNTER — HOME CARE VISIT (OUTPATIENT)
Dept: HOME HEALTH SERVICES | Facility: HOME HEALTH | Age: 85
End: 2020-07-28
Payer: MEDICARE

## 2020-07-28 VITALS
OXYGEN SATURATION: 98 % | HEART RATE: 77 BPM | DIASTOLIC BLOOD PRESSURE: 62 MMHG | SYSTOLIC BLOOD PRESSURE: 90 MMHG | TEMPERATURE: 98.7 F

## 2020-07-28 PROCEDURE — 3331090002 HH PPS REVENUE DEBIT

## 2020-07-28 PROCEDURE — 3331090001 HH PPS REVENUE CREDIT

## 2020-07-28 PROCEDURE — G0152 HHCP-SERV OF OT,EA 15 MIN: HCPCS

## 2020-07-28 PROCEDURE — G0300 HHS/HOSPICE OF LPN EA 15 MIN: HCPCS

## 2020-07-29 ENCOUNTER — HOME CARE VISIT (OUTPATIENT)
Dept: SCHEDULING | Facility: HOME HEALTH | Age: 85
End: 2020-07-29
Payer: MEDICARE

## 2020-07-29 ENCOUNTER — HOME CARE VISIT (OUTPATIENT)
Dept: HOME HEALTH SERVICES | Facility: HOME HEALTH | Age: 85
End: 2020-07-29
Payer: MEDICARE

## 2020-07-29 VITALS
RESPIRATION RATE: 17 BRPM | TEMPERATURE: 98.5 F | SYSTOLIC BLOOD PRESSURE: 89 MMHG | DIASTOLIC BLOOD PRESSURE: 52 MMHG | OXYGEN SATURATION: 98 % | HEART RATE: 62 BPM

## 2020-07-29 PROCEDURE — G0151 HHCP-SERV OF PT,EA 15 MIN: HCPCS

## 2020-07-29 PROCEDURE — 3331090002 HH PPS REVENUE DEBIT

## 2020-07-29 PROCEDURE — 3331090001 HH PPS REVENUE CREDIT

## 2020-07-30 ENCOUNTER — HOME CARE VISIT (OUTPATIENT)
Dept: SCHEDULING | Facility: HOME HEALTH | Age: 85
End: 2020-07-30
Payer: MEDICARE

## 2020-07-30 VITALS
RESPIRATION RATE: 18 BRPM | HEART RATE: 75 BPM | SYSTOLIC BLOOD PRESSURE: 108 MMHG | WEIGHT: 132 LBS | DIASTOLIC BLOOD PRESSURE: 70 MMHG | BODY MASS INDEX: 21.31 KG/M2 | OXYGEN SATURATION: 93 % | TEMPERATURE: 98.1 F

## 2020-07-30 PROCEDURE — 3331090002 HH PPS REVENUE DEBIT

## 2020-07-30 PROCEDURE — 3331090001 HH PPS REVENUE CREDIT

## 2020-07-31 ENCOUNTER — HOME CARE VISIT (OUTPATIENT)
Dept: HOME HEALTH SERVICES | Facility: HOME HEALTH | Age: 85
End: 2020-07-31
Payer: MEDICARE

## 2020-07-31 ENCOUNTER — HOME CARE VISIT (OUTPATIENT)
Dept: SCHEDULING | Facility: HOME HEALTH | Age: 85
End: 2020-07-31
Payer: MEDICARE

## 2020-07-31 VITALS
OXYGEN SATURATION: 94 % | TEMPERATURE: 98.6 F | HEART RATE: 71 BPM | SYSTOLIC BLOOD PRESSURE: 80 MMHG | DIASTOLIC BLOOD PRESSURE: 48 MMHG

## 2020-07-31 PROCEDURE — G0151 HHCP-SERV OF PT,EA 15 MIN: HCPCS

## 2020-07-31 PROCEDURE — 3331090001 HH PPS REVENUE CREDIT

## 2020-07-31 PROCEDURE — G0299 HHS/HOSPICE OF RN EA 15 MIN: HCPCS

## 2020-07-31 PROCEDURE — G0152 HHCP-SERV OF OT,EA 15 MIN: HCPCS

## 2020-07-31 PROCEDURE — 3331090002 HH PPS REVENUE DEBIT

## 2020-08-01 PROCEDURE — 3331090002 HH PPS REVENUE DEBIT

## 2020-08-01 PROCEDURE — 3331090001 HH PPS REVENUE CREDIT

## 2020-08-02 PROCEDURE — 3331090002 HH PPS REVENUE DEBIT

## 2020-08-02 PROCEDURE — 3331090001 HH PPS REVENUE CREDIT

## 2020-08-03 ENCOUNTER — HOME CARE VISIT (OUTPATIENT)
Dept: SCHEDULING | Facility: HOME HEALTH | Age: 85
End: 2020-08-03
Payer: MEDICARE

## 2020-08-03 ENCOUNTER — HOME CARE VISIT (OUTPATIENT)
Dept: HOME HEALTH SERVICES | Facility: HOME HEALTH | Age: 85
End: 2020-08-03
Payer: MEDICARE

## 2020-08-03 VITALS
SYSTOLIC BLOOD PRESSURE: 98 MMHG | OXYGEN SATURATION: 97 % | TEMPERATURE: 99 F | DIASTOLIC BLOOD PRESSURE: 60 MMHG | HEART RATE: 70 BPM

## 2020-08-03 VITALS
OXYGEN SATURATION: 98 % | DIASTOLIC BLOOD PRESSURE: 52 MMHG | TEMPERATURE: 98.2 F | SYSTOLIC BLOOD PRESSURE: 90 MMHG | RESPIRATION RATE: 17 BRPM | HEART RATE: 67 BPM

## 2020-08-03 VITALS
OXYGEN SATURATION: 95 % | RESPIRATION RATE: 18 BRPM | TEMPERATURE: 96.7 F | SYSTOLIC BLOOD PRESSURE: 90 MMHG | HEART RATE: 72 BPM | DIASTOLIC BLOOD PRESSURE: 50 MMHG

## 2020-08-03 PROCEDURE — 3331090001 HH PPS REVENUE CREDIT

## 2020-08-03 PROCEDURE — G0152 HHCP-SERV OF OT,EA 15 MIN: HCPCS

## 2020-08-03 PROCEDURE — 3331090002 HH PPS REVENUE DEBIT

## 2020-08-03 PROCEDURE — G0151 HHCP-SERV OF PT,EA 15 MIN: HCPCS

## 2020-08-04 PROCEDURE — 3331090002 HH PPS REVENUE DEBIT

## 2020-08-04 PROCEDURE — 3331090001 HH PPS REVENUE CREDIT

## 2020-08-05 ENCOUNTER — HOME CARE VISIT (OUTPATIENT)
Dept: SCHEDULING | Facility: HOME HEALTH | Age: 85
End: 2020-08-05
Payer: MEDICARE

## 2020-08-05 ENCOUNTER — HOME CARE VISIT (OUTPATIENT)
Dept: HOME HEALTH SERVICES | Facility: HOME HEALTH | Age: 85
End: 2020-08-05
Payer: MEDICARE

## 2020-08-05 VITALS
OXYGEN SATURATION: 99 % | TEMPERATURE: 98 F | DIASTOLIC BLOOD PRESSURE: 64 MMHG | SYSTOLIC BLOOD PRESSURE: 100 MMHG | RESPIRATION RATE: 17 BRPM | HEART RATE: 72 BPM

## 2020-08-05 PROCEDURE — G0151 HHCP-SERV OF PT,EA 15 MIN: HCPCS

## 2020-08-05 PROCEDURE — 3331090001 HH PPS REVENUE CREDIT

## 2020-08-05 PROCEDURE — 3331090002 HH PPS REVENUE DEBIT

## 2020-08-06 ENCOUNTER — HOME CARE VISIT (OUTPATIENT)
Dept: SCHEDULING | Facility: HOME HEALTH | Age: 85
End: 2020-08-06
Payer: MEDICARE

## 2020-08-06 ENCOUNTER — HOME CARE VISIT (OUTPATIENT)
Dept: HOME HEALTH SERVICES | Facility: HOME HEALTH | Age: 85
End: 2020-08-06
Payer: MEDICARE

## 2020-08-06 VITALS
DIASTOLIC BLOOD PRESSURE: 58 MMHG | SYSTOLIC BLOOD PRESSURE: 92 MMHG | OXYGEN SATURATION: 97 % | TEMPERATURE: 98.8 F | HEART RATE: 81 BPM

## 2020-08-06 PROCEDURE — 3331090002 HH PPS REVENUE DEBIT

## 2020-08-06 PROCEDURE — 3331090001 HH PPS REVENUE CREDIT

## 2020-08-06 PROCEDURE — G0152 HHCP-SERV OF OT,EA 15 MIN: HCPCS

## 2020-08-07 ENCOUNTER — HOME CARE VISIT (OUTPATIENT)
Dept: SCHEDULING | Facility: HOME HEALTH | Age: 85
End: 2020-08-07
Payer: MEDICARE

## 2020-08-07 PROCEDURE — 3331090002 HH PPS REVENUE DEBIT

## 2020-08-07 PROCEDURE — 3331090001 HH PPS REVENUE CREDIT

## 2020-08-07 PROCEDURE — G0299 HHS/HOSPICE OF RN EA 15 MIN: HCPCS

## 2020-08-08 PROCEDURE — 3331090002 HH PPS REVENUE DEBIT

## 2020-08-08 PROCEDURE — 3331090001 HH PPS REVENUE CREDIT

## 2020-08-09 VITALS
HEART RATE: 91 BPM | DIASTOLIC BLOOD PRESSURE: 68 MMHG | TEMPERATURE: 97.5 F | SYSTOLIC BLOOD PRESSURE: 104 MMHG | OXYGEN SATURATION: 95 %

## 2020-08-09 PROCEDURE — 3331090002 HH PPS REVENUE DEBIT

## 2020-08-09 PROCEDURE — 3331090001 HH PPS REVENUE CREDIT

## 2020-08-10 ENCOUNTER — HOME CARE VISIT (OUTPATIENT)
Dept: HOME HEALTH SERVICES | Facility: HOME HEALTH | Age: 85
End: 2020-08-10
Payer: MEDICARE

## 2020-08-10 ENCOUNTER — HOME CARE VISIT (OUTPATIENT)
Dept: SCHEDULING | Facility: HOME HEALTH | Age: 85
End: 2020-08-10
Payer: MEDICARE

## 2020-08-10 VITALS
OXYGEN SATURATION: 98 % | DIASTOLIC BLOOD PRESSURE: 62 MMHG | HEART RATE: 77 BPM | SYSTOLIC BLOOD PRESSURE: 100 MMHG | TEMPERATURE: 98.7 F

## 2020-08-10 PROCEDURE — G0152 HHCP-SERV OF OT,EA 15 MIN: HCPCS

## 2020-08-10 PROCEDURE — 3331090002 HH PPS REVENUE DEBIT

## 2020-08-10 PROCEDURE — 3331090001 HH PPS REVENUE CREDIT

## 2020-08-10 RX ORDER — SPIRONOLACTONE 25 MG/1
12.5 TABLET ORAL DAILY
Qty: 30 TAB | Refills: 0 | Status: SHIPPED | OUTPATIENT
Start: 2020-08-10 | End: 2020-09-09

## 2020-08-11 ENCOUNTER — HOME CARE VISIT (OUTPATIENT)
Dept: SCHEDULING | Facility: HOME HEALTH | Age: 85
End: 2020-08-11
Payer: MEDICARE

## 2020-08-11 ENCOUNTER — PATIENT OUTREACH (OUTPATIENT)
Dept: CASE MANAGEMENT | Age: 85
End: 2020-08-11

## 2020-08-11 PROCEDURE — 3331090001 HH PPS REVENUE CREDIT

## 2020-08-11 PROCEDURE — G0157 HHC PT ASSISTANT EA 15: HCPCS

## 2020-08-11 PROCEDURE — 3331090002 HH PPS REVENUE DEBIT

## 2020-08-11 PROCEDURE — G0299 HHS/HOSPICE OF RN EA 15 MIN: HCPCS

## 2020-08-11 NOTE — PROGRESS NOTES
Patient resolved from Transition of Care episode on 08/11/20. ACM/CTN was unsuccessful at contacting this patient today. Patient/family was provided the following resources and education related to COVID-19 during the initial call:                         Signs, symptoms and red flags related to COVID-19            CDC exposure and quarantine guidelines            [facility] exposure contact - [phone]            Contact for their local Department of Health                 Patient has not had any additional ED or hospital visits. No further outreach scheduled with this CTN/ACM. Episode of Care resolved. Patient has this CTN/ACM contact information if future needs arise.

## 2020-08-12 VITALS
SYSTOLIC BLOOD PRESSURE: 102 MMHG | HEART RATE: 70 BPM | TEMPERATURE: 96.8 F | RESPIRATION RATE: 16 BRPM | DIASTOLIC BLOOD PRESSURE: 58 MMHG | OXYGEN SATURATION: 96 %

## 2020-08-12 PROCEDURE — 3331090001 HH PPS REVENUE CREDIT

## 2020-08-12 PROCEDURE — 3331090002 HH PPS REVENUE DEBIT

## 2020-08-13 ENCOUNTER — HOME CARE VISIT (OUTPATIENT)
Dept: HOME HEALTH SERVICES | Facility: HOME HEALTH | Age: 85
End: 2020-08-13
Payer: MEDICARE

## 2020-08-13 ENCOUNTER — HOME CARE VISIT (OUTPATIENT)
Dept: SCHEDULING | Facility: HOME HEALTH | Age: 85
End: 2020-08-13
Payer: MEDICARE

## 2020-08-13 VITALS
DIASTOLIC BLOOD PRESSURE: 62 MMHG | OXYGEN SATURATION: 98 % | RESPIRATION RATE: 18 BRPM | HEART RATE: 68 BPM | SYSTOLIC BLOOD PRESSURE: 101 MMHG | TEMPERATURE: 98.2 F

## 2020-08-13 VITALS
TEMPERATURE: 98.2 F | OXYGEN SATURATION: 97 % | SYSTOLIC BLOOD PRESSURE: 102 MMHG | DIASTOLIC BLOOD PRESSURE: 68 MMHG | HEART RATE: 68 BPM

## 2020-08-13 VITALS
OXYGEN SATURATION: 97 % | DIASTOLIC BLOOD PRESSURE: 58 MMHG | TEMPERATURE: 98.1 F | SYSTOLIC BLOOD PRESSURE: 90 MMHG | HEART RATE: 87 BPM | RESPIRATION RATE: 18 BRPM

## 2020-08-13 PROCEDURE — G0157 HHC PT ASSISTANT EA 15: HCPCS

## 2020-08-13 PROCEDURE — G0152 HHCP-SERV OF OT,EA 15 MIN: HCPCS

## 2020-08-13 PROCEDURE — 3331090002 HH PPS REVENUE DEBIT

## 2020-08-13 PROCEDURE — 3331090001 HH PPS REVENUE CREDIT

## 2020-08-14 ENCOUNTER — HOME CARE VISIT (OUTPATIENT)
Dept: SCHEDULING | Facility: HOME HEALTH | Age: 85
End: 2020-08-14
Payer: MEDICARE

## 2020-08-14 VITALS
BODY MASS INDEX: 20.86 KG/M2 | SYSTOLIC BLOOD PRESSURE: 102 MMHG | WEIGHT: 129.25 LBS | HEART RATE: 87 BPM | DIASTOLIC BLOOD PRESSURE: 54 MMHG | TEMPERATURE: 97.9 F | RESPIRATION RATE: 18 BRPM | OXYGEN SATURATION: 98 %

## 2020-08-14 PROCEDURE — G0300 HHS/HOSPICE OF LPN EA 15 MIN: HCPCS

## 2020-08-14 PROCEDURE — 3331090001 HH PPS REVENUE CREDIT

## 2020-08-14 PROCEDURE — 3331090002 HH PPS REVENUE DEBIT

## 2020-08-15 PROCEDURE — 3331090002 HH PPS REVENUE DEBIT

## 2020-08-15 PROCEDURE — 3331090001 HH PPS REVENUE CREDIT

## 2020-08-16 PROCEDURE — 3331090001 HH PPS REVENUE CREDIT

## 2020-08-16 PROCEDURE — 3331090002 HH PPS REVENUE DEBIT

## 2020-08-17 PROCEDURE — 3331090001 HH PPS REVENUE CREDIT

## 2020-08-17 PROCEDURE — 3331090002 HH PPS REVENUE DEBIT

## 2020-08-18 ENCOUNTER — HOME CARE VISIT (OUTPATIENT)
Dept: SCHEDULING | Facility: HOME HEALTH | Age: 85
End: 2020-08-18
Payer: MEDICARE

## 2020-08-18 ENCOUNTER — HOME CARE VISIT (OUTPATIENT)
Dept: HOME HEALTH SERVICES | Facility: HOME HEALTH | Age: 85
End: 2020-08-18
Payer: MEDICARE

## 2020-08-18 VITALS
RESPIRATION RATE: 17 BRPM | DIASTOLIC BLOOD PRESSURE: 62 MMHG | HEART RATE: 67 BPM | SYSTOLIC BLOOD PRESSURE: 102 MMHG | TEMPERATURE: 98.2 F | OXYGEN SATURATION: 97 %

## 2020-08-18 PROCEDURE — G0151 HHCP-SERV OF PT,EA 15 MIN: HCPCS

## 2020-08-18 PROCEDURE — 3331090001 HH PPS REVENUE CREDIT

## 2020-08-18 PROCEDURE — 3331090002 HH PPS REVENUE DEBIT

## 2020-08-19 PROCEDURE — 3331090001 HH PPS REVENUE CREDIT

## 2020-08-19 PROCEDURE — 3331090002 HH PPS REVENUE DEBIT

## 2020-08-20 ENCOUNTER — HOME CARE VISIT (OUTPATIENT)
Dept: HOME HEALTH SERVICES | Facility: HOME HEALTH | Age: 85
End: 2020-08-20
Payer: MEDICARE

## 2020-08-20 ENCOUNTER — HOME CARE VISIT (OUTPATIENT)
Dept: SCHEDULING | Facility: HOME HEALTH | Age: 85
End: 2020-08-20
Payer: MEDICARE

## 2020-08-20 PROCEDURE — 3331090001 HH PPS REVENUE CREDIT

## 2020-08-20 PROCEDURE — 3331090002 HH PPS REVENUE DEBIT

## 2020-08-21 ENCOUNTER — HOME CARE VISIT (OUTPATIENT)
Dept: HOME HEALTH SERVICES | Facility: HOME HEALTH | Age: 85
End: 2020-08-21
Payer: MEDICARE

## 2020-08-21 PROCEDURE — 3331090002 HH PPS REVENUE DEBIT

## 2020-08-21 PROCEDURE — 3331090001 HH PPS REVENUE CREDIT

## 2020-08-22 PROCEDURE — 3331090002 HH PPS REVENUE DEBIT

## 2020-08-22 PROCEDURE — 3331090001 HH PPS REVENUE CREDIT

## 2020-08-23 PROCEDURE — 3331090002 HH PPS REVENUE DEBIT

## 2020-08-23 PROCEDURE — 3331090001 HH PPS REVENUE CREDIT

## 2020-08-24 PROCEDURE — 3331090001 HH PPS REVENUE CREDIT

## 2020-08-24 PROCEDURE — 3331090002 HH PPS REVENUE DEBIT

## 2020-08-25 PROCEDURE — 3331090002 HH PPS REVENUE DEBIT

## 2020-08-25 PROCEDURE — 3331090001 HH PPS REVENUE CREDIT

## 2020-08-26 PROCEDURE — 3331090001 HH PPS REVENUE CREDIT

## 2020-08-26 PROCEDURE — 3331090002 HH PPS REVENUE DEBIT

## 2020-08-27 ENCOUNTER — HOME CARE VISIT (OUTPATIENT)
Dept: SCHEDULING | Facility: HOME HEALTH | Age: 85
End: 2020-08-27
Payer: MEDICARE

## 2020-08-27 VITALS
DIASTOLIC BLOOD PRESSURE: 72 MMHG | OXYGEN SATURATION: 97 % | RESPIRATION RATE: 18 BRPM | SYSTOLIC BLOOD PRESSURE: 120 MMHG | HEART RATE: 63 BPM | TEMPERATURE: 97.5 F

## 2020-08-27 PROCEDURE — 3331090002 HH PPS REVENUE DEBIT

## 2020-08-27 PROCEDURE — G0299 HHS/HOSPICE OF RN EA 15 MIN: HCPCS

## 2020-08-27 PROCEDURE — 400013 HH SOC

## 2020-08-27 PROCEDURE — 3331090001 HH PPS REVENUE CREDIT

## 2020-08-28 PROCEDURE — 3331090001 HH PPS REVENUE CREDIT

## 2020-08-28 PROCEDURE — 3331090002 HH PPS REVENUE DEBIT

## 2020-08-29 PROCEDURE — 3331090002 HH PPS REVENUE DEBIT

## 2020-08-29 PROCEDURE — 3331090001 HH PPS REVENUE CREDIT

## 2020-08-30 PROCEDURE — 3331090001 HH PPS REVENUE CREDIT

## 2020-08-30 PROCEDURE — 3331090002 HH PPS REVENUE DEBIT

## 2020-08-31 PROCEDURE — 3331090002 HH PPS REVENUE DEBIT

## 2020-08-31 PROCEDURE — 3331090001 HH PPS REVENUE CREDIT

## 2020-09-01 PROCEDURE — 3331090002 HH PPS REVENUE DEBIT

## 2020-09-01 PROCEDURE — 3331090001 HH PPS REVENUE CREDIT

## 2020-09-02 PROCEDURE — 3331090002 HH PPS REVENUE DEBIT

## 2020-09-02 PROCEDURE — 3331090001 HH PPS REVENUE CREDIT

## 2020-09-03 ENCOUNTER — HOME CARE VISIT (OUTPATIENT)
Dept: SCHEDULING | Facility: HOME HEALTH | Age: 85
End: 2020-09-03
Payer: MEDICARE

## 2020-09-03 VITALS
RESPIRATION RATE: 16 BRPM | DIASTOLIC BLOOD PRESSURE: 64 MMHG | OXYGEN SATURATION: 97 % | SYSTOLIC BLOOD PRESSURE: 102 MMHG | HEART RATE: 71 BPM

## 2020-09-03 PROCEDURE — 3331090001 HH PPS REVENUE CREDIT

## 2020-09-03 PROCEDURE — G0299 HHS/HOSPICE OF RN EA 15 MIN: HCPCS

## 2020-09-03 PROCEDURE — 3331090002 HH PPS REVENUE DEBIT

## 2022-03-19 PROBLEM — I50.9 CHF (CONGESTIVE HEART FAILURE) (HCC): Status: ACTIVE | Noted: 2020-07-19

## 2022-03-19 PROBLEM — J96.00 ACUTE RESPIRATORY FAILURE (HCC): Status: ACTIVE | Noted: 2020-07-22

## 2022-05-08 ENCOUNTER — APPOINTMENT (OUTPATIENT)
Dept: GENERAL RADIOLOGY | Age: 87
End: 2022-05-08
Attending: EMERGENCY MEDICINE
Payer: MEDICARE

## 2022-05-08 ENCOUNTER — APPOINTMENT (OUTPATIENT)
Dept: CT IMAGING | Age: 87
End: 2022-05-08
Attending: EMERGENCY MEDICINE
Payer: MEDICARE

## 2022-05-08 ENCOUNTER — HOSPITAL ENCOUNTER (EMERGENCY)
Age: 87
Discharge: HOME | End: 2022-05-08
Attending: EMERGENCY MEDICINE
Payer: MEDICARE

## 2022-05-08 VITALS
WEIGHT: 151.01 LBS | TEMPERATURE: 98.4 F | SYSTOLIC BLOOD PRESSURE: 149 MMHG | DIASTOLIC BLOOD PRESSURE: 84 MMHG | HEART RATE: 89 BPM | OXYGEN SATURATION: 93 % | BODY MASS INDEX: 24.37 KG/M2 | RESPIRATION RATE: 25 BRPM

## 2022-05-08 DIAGNOSIS — N39.0 URINARY TRACT INFECTION WITHOUT HEMATURIA, SITE UNSPECIFIED: Primary | ICD-10-CM

## 2022-05-08 DIAGNOSIS — S00.83XA CONTUSION OF FACE, INITIAL ENCOUNTER: ICD-10-CM
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
## 2022-05-08 DIAGNOSIS — S90.02XA CONTUSION OF LEFT ANKLE, INITIAL ENCOUNTER: ICD-10-CM

## 2022-05-08 LAB
ALBUMIN SERPL-MCNC: 3.3 G/DL (ref 3.5–5)
ALBUMIN/GLOB SERPL: 0.8 {RATIO} (ref 1.1–2.2)
ALP SERPL-CCNC: 78 U/L (ref 45–117)
ALT SERPL-CCNC: 22 U/L (ref 12–78)
ANION GAP SERPL CALC-SCNC: 4 MMOL/L (ref 5–15)
APPEARANCE UR: ABNORMAL
APTT PPP: 29.2 SEC (ref 22.1–31)
AST SERPL-CCNC: 38 U/L (ref 15–37)
BACTERIA URNS QL MICRO: ABNORMAL /HPF
BASOPHILS # BLD: 0.1 K/UL (ref 0–0.1)
BASOPHILS NFR BLD: 1 % (ref 0–1)
BILIRUB SERPL-MCNC: 0.8 MG/DL (ref 0.2–1)
BILIRUB UR QL: NEGATIVE
BUN SERPL-MCNC: 31 MG/DL (ref 6–20)
BUN/CREAT SERPL: 22 (ref 12–20)
CALCIUM SERPL-MCNC: 9.2 MG/DL (ref 8.5–10.1)
CHLORIDE SERPL-SCNC: 103 MMOL/L (ref 97–108)
CO2 SERPL-SCNC: 28 MMOL/L (ref 21–32)
COLOR UR: ABNORMAL
COMMENT, HOLDF: NORMAL
CREAT SERPL-MCNC: 1.41 MG/DL (ref 0.55–1.02)
DIFFERENTIAL METHOD BLD: ABNORMAL
EOSINOPHIL # BLD: 0.5 K/UL (ref 0–0.4)
EOSINOPHIL NFR BLD: 3 % (ref 0–7)
EPITH CASTS URNS QL MICRO: ABNORMAL /LPF
ERYTHROCYTE [DISTWIDTH] IN BLOOD BY AUTOMATED COUNT: 13.1 % (ref 11.5–14.5)
GLOBULIN SER CALC-MCNC: 4.4 G/DL (ref 2–4)
GLUCOSE SERPL-MCNC: 115 MG/DL (ref 65–100)
GLUCOSE UR STRIP.AUTO-MCNC: NEGATIVE MG/DL
HCT VFR BLD AUTO: 42.4 % (ref 35–47)
HGB BLD-MCNC: 14 G/DL (ref 11.5–16)
HGB UR QL STRIP: ABNORMAL
HYALINE CASTS URNS QL MICRO: ABNORMAL /LPF (ref 0–5)
IMM GRANULOCYTES # BLD AUTO: 0.1 K/UL (ref 0–0.04)
IMM GRANULOCYTES NFR BLD AUTO: 1 % (ref 0–0.5)
KETONES UR QL STRIP.AUTO: NEGATIVE MG/DL
LEUKOCYTE ESTERASE UR QL STRIP.AUTO: ABNORMAL
LYMPHOCYTES # BLD: 2.6 K/UL (ref 0.8–3.5)
LYMPHOCYTES NFR BLD: 18 % (ref 12–49)
MCH RBC QN AUTO: 31.6 PG (ref 26–34)
MCHC RBC AUTO-ENTMCNC: 33 G/DL (ref 30–36.5)
MCV RBC AUTO: 95.7 FL (ref 80–99)
MONOCYTES # BLD: 1.5 K/UL (ref 0–1)
MONOCYTES NFR BLD: 10 % (ref 5–13)
NEUTS SEG # BLD: 10 K/UL (ref 1.8–8)
NEUTS SEG NFR BLD: 67 % (ref 32–75)
NITRITE UR QL STRIP.AUTO: POSITIVE
NRBC # BLD: 0 K/UL (ref 0–0.01)
NRBC BLD-RTO: 0 PER 100 WBC
PH UR STRIP: 5 [PH] (ref 5–8)
PLATELET # BLD AUTO: 188 K/UL (ref 150–400)
PMV BLD AUTO: 11.4 FL (ref 8.9–12.9)
POTASSIUM SERPL-SCNC: 4.8 MMOL/L (ref 3.5–5.1)
PROT SERPL-MCNC: 7.7 G/DL (ref 6.4–8.2)
PROT UR STRIP-MCNC: NEGATIVE MG/DL
RBC # BLD AUTO: 4.43 M/UL (ref 3.8–5.2)
RBC #/AREA URNS HPF: ABNORMAL /HPF (ref 0–5)
SAMPLES BEING HELD,HOLD: NORMAL
SODIUM SERPL-SCNC: 135 MMOL/L (ref 136–145)
SP GR UR REFRACTOMETRY: 1.01 (ref 1–1.03)
THERAPEUTIC RANGE,PTTT: NORMAL SECS (ref 58–77)
UROBILINOGEN UR QL STRIP.AUTO: 0.2 EU/DL (ref 0.2–1)
WBC # BLD AUTO: 14.9 K/UL (ref 3.6–11)
WBC URNS QL MICRO: ABNORMAL /HPF (ref 0–4)

## 2022-05-08 PROCEDURE — 85025 COMPLETE CBC W/AUTO DIFF WBC: CPT

## 2022-05-08 PROCEDURE — 93005 ELECTROCARDIOGRAM TRACING: CPT

## 2022-05-08 PROCEDURE — 70450 CT HEAD/BRAIN W/O DYE: CPT

## 2022-05-08 PROCEDURE — 74011250636 HC RX REV CODE- 250/636: Performed by: EMERGENCY MEDICINE

## 2022-05-08 PROCEDURE — 87086 URINE CULTURE/COLONY COUNT: CPT

## 2022-05-08 PROCEDURE — 96365 THER/PROPH/DIAG IV INF INIT: CPT

## 2022-05-08 PROCEDURE — 99285 EMERGENCY DEPT VISIT HI MDM: CPT

## 2022-05-08 PROCEDURE — 85730 THROMBOPLASTIN TIME PARTIAL: CPT

## 2022-05-08 PROCEDURE — 81001 URINALYSIS AUTO W/SCOPE: CPT

## 2022-05-08 PROCEDURE — 71045 X-RAY EXAM CHEST 1 VIEW: CPT

## 2022-05-08 PROCEDURE — 80053 COMPREHEN METABOLIC PANEL: CPT

## 2022-05-08 PROCEDURE — 70486 CT MAXILLOFACIAL W/O DYE: CPT

## 2022-05-08 PROCEDURE — 87077 CULTURE AEROBIC IDENTIFY: CPT

## 2022-05-08 PROCEDURE — 36415 COLL VENOUS BLD VENIPUNCTURE: CPT

## 2022-05-08 PROCEDURE — 87186 SC STD MICRODIL/AGAR DIL: CPT

## 2022-05-08 PROCEDURE — 73610 X-RAY EXAM OF ANKLE: CPT

## 2022-05-08 RX ORDER — LEVOFLOXACIN 500 MG/1
250 TABLET, FILM COATED ORAL DAILY
Qty: 7 TABLET | Refills: 0 | Status: SHIPPED | OUTPATIENT
Start: 2022-05-08 | End: 2022-05-08 | Stop reason: CLARIF

## 2022-05-08 RX ORDER — LEVOFLOXACIN 5 MG/ML
500 INJECTION, SOLUTION INTRAVENOUS
Status: COMPLETED | OUTPATIENT
Start: 2022-05-08 | End: 2022-05-08

## 2022-05-08 RX ADMIN — LEVOFLOXACIN 500 MG: 500 INJECTION, SOLUTION INTRAVENOUS at 12:23

## 2022-05-08 NOTE — ED PROVIDER NOTES
This is a 72-year-old female who lives at [facility] and apparently fell at some point during the night. She has some dementia and is unable to provide any meaningful history. She states that she had some pain in her left ankle and her face. She denies any pain in her shoulder. She did not lose consciousness. She has no neck pain. There is some swelling on the right side of her face with bruising around the right eye. She has no chest pain, she states she is not short of breath. She denies abdominal pain or hip pain and says her ankle and shoulder do not hurt. She voices no other acute complaint. The home has reported no new infections or acute problems. Past Medical History:   Diagnosis Date    A-fib [facility])     CHF (congestive heart failure) ([facility])     EF of 55-60%    Hypertension     TIA (transient ischemic attack)        Past Surgical History:   Procedure Laterality Date    HX CATARACT REMOVAL      HX TONSILLECTOMY           No family history on file. Social History     Socioeconomic History    Marital status:      Spouse name: Not on file    Number of children: Not on file    Years of education: Not on file    Highest education level: Not on file   Occupational History    Not on file   Tobacco Use    Smoking status: Former Smoker     Years: 30.00    Smokeless tobacco: Never Used   Substance and Sexual Activity    Alcohol use: Not Currently    Drug use: Not on file    Sexual activity: Not on file   Other Topics Concern    Not on file   Social History Narrative    Not on file     Social Determinants of Health     Financial Resource Strain:     Difficulty of Paying Living Expenses: Not on file   Food Insecurity:     Worried About [address] in the Last Year: Not on file    Bean of Food in the Last Year: Not on file   Transportation Needs:     Lack of Transportation (Medical): Not on file    Lack of Transportation (Non-Medical):  Not on file   Physical Activity:     Days of Exercise per Week: Not on file    Minutes of Exercise per Session: Not on file   Stress:     Feeling of Stress : Not on file   Social Connections:     Frequency of Communication with Friends and Family: Not on file    Frequency of Social Gatherings with Friends and Family: Not on file    Attends [employer] Services: Not on file    Active Member of [address] [place] [address] or Organizations: Not on file    Attends Club or Organization Meetings: Not on file    Marital Status: Not on file   Intimate Partner Violence:     Fear of Current or Ex-Partner: Not on file    Emotionally Abused: Not on file    Physically Abused: Not on file    Sexually Abused: Not on file   Housing Stability:     Unable to Pay for Housing in the Last Year: Not on file    Number of [place] in the Last Year: Not on file    Unstable Housing in the Last Year: Not on file         ALLERGIES: Pcn [penicillins]    Review of Systems   Unable to perform ROS: Dementia       Vitals:    05/08/22 0852   Temp: 98.4 °F (36.9 °C)            Physical Exam  Constitutional:       General: She is not in acute distress. Appearance: She is not ill-appearing or diaphoretic. Comments: This is a 61-year-old female who was a bit confused and presents with some swelling in the right side of her face and a dressing on her left ankle. Vital signs are as noted. HENT:      Head: Normocephalic. Comments: There is some swelling over the malar eminence of the face as well as some ecchymosis on the lateral aspect of the lower lid on the right. Right Ear: External ear normal.      Left Ear: External ear normal.      Nose: Nose normal.      Mouth/Throat:      Mouth: Mucous membranes are moist.   Eyes:      Extraocular Movements: Extraocular movements intact. Conjunctiva/sclera: Conjunctivae normal.      Pupils: Pupils are equal, round, and reactive to light. Cardiovascular:      Rate and Rhythm: Normal rate. Rhythm irregular. Pulses: Normal pulses. Heart sounds: No murmur heard. Pulmonary:      Effort: Pulmonary effort is normal.      Breath sounds: Rales ( Rales bilaterally) present. Abdominal:      General: Abdomen is flat. There is no distension. Palpations: Abdomen is soft. Tenderness: There is no abdominal tenderness. There is no guarding. Musculoskeletal:         General: No swelling, tenderness, deformity or signs of injury. Cervical back: Normal range of motion and neck supple. Comments: There is no significant swelling noted over the left ankle. The patient is able to dorsiflex and plantarflex against pressure without difficulty. No significant local tenderness is noted. This ankle was dressed and ice by EMS. He apparently complained of pain in that area. Hips and shoulders are nontender. Extremities otherwise unremarkable. Skin:     General: Skin is warm and dry. Capillary Refill: Capillary refill takes 2 to 3 seconds. Coloration: Skin is pale. Neurological:      General: No focal deficit present. Mental Status: She is disoriented.       Comments: Patient has dementia   Psychiatric:         Mood and Affect: Mood normal.         Behavior: Behavior normal.          MDM  Number of Diagnoses or Management Options     Amount and/or Complexity of Data Reviewed  Clinical lab tests: ordered and reviewed  Tests in the radiology section of CPT®: ordered and reviewed  Decide to obtain previous medical records or to obtain history from someone other than the patient: yes  Review and summarize past medical records: yes  Independent visualization of images, tracings, or specimens: yes    Risk of Complications, Morbidity, and/or Mortality  Presenting problems: high  Diagnostic procedures: high  Management options: high    Patient Progress  Patient progress: stable         Procedures    This is a 70-year-old female who presents with an swelling and apparent trauma of the right side of her face from a fall. She also initially complained of pain in the left ankle. No other evidence of trauma is noted. It is unclear why she actually fell. We will obtain some labs, urine and CTs of the head and x-ray of the left ankle. We will also x-ray her chest due to some Rales bilaterally. No consolidation is noted. Will reevaluate upon completion of studies. ED MD EKG interpretation: There is an atrial fibrillation noted with possible ectopic beats. Left axis shift is noted. Ventricular rate is at 91 beats a minute. Poor R wave progression is noted. No acute ischemic changes are appreciated. Abbie Avendano MD    Patient's films were all negative. The urinalysis suggest an infection. We will culture the urine and give her a dose of IV Levaquin here and send her home on Levaquin. She will follow-up with her own physician in several days for further evaluation and treatment as needed.

## 2022-05-08 NOTE — DISCHARGE INSTRUCTIONS
You will need to use the medications as directed plenty of fluids for the urinary infection. See your own doctor in the next week to have the urine rechecked and make sure you are getting better from that infection. Use cool compresses to the areas that are injured from the fall. Do these 3-4 times daily for 20 or 30 minutes at a time.

## 2022-05-08 NOTE — ED TRIAGE NOTES
Pt arrives via EMS c/o GLF overnight. Fall was unwitnessed and down time is unknown. Unknown LOC. Upon arrival, pt is alert and c/o R cheek/orbital pain, R shoulder pain, and L ankle pain. PMS to all extremities intact. Pt has slight bruising to R side of face. A&O x3, clear speech, follows commands, NAD.

## 2022-05-09 LAB
CALCULATED R AXIS, ECG10: -72 DEGREES
CALCULATED T AXIS, ECG11: 101 DEGREES
DIAGNOSIS, 93000: NORMAL
Q-T INTERVAL, ECG07: 396 MS
QRS DURATION, ECG06: 110 MS
QTC CALCULATION (BEZET), ECG08: 487 MS
VENTRICULAR RATE, ECG03: 91 BPM

## 2022-05-10 LAB
BACTERIA SPEC CULT: ABNORMAL
BACTERIA SPEC CULT: ABNORMAL
CC UR VC: ABNORMAL
SERVICE CMNT-IMP: ABNORMAL

## 2022-05-10 NOTE — PROGRESS NOTES
[facility]   I called and left message with staff to call back concerning urine culture result and need for antibiotic and fax number

## 2022-05-11 NOTE — PROGRESS NOTES
I was able to speak with [facility] staff. Pt was discharged with levaquin and taking levaquin.  No further Subjective:      Patient ID: Winnie Gillespie 4/6/2016    Procedure: COLONOSCOPY;  Surgeon: Sharath Tang MD;  Location: Baptist Memorial Cole Andersen MD;  Location: [facility];  NATALIIA ZuñigaM

## 2023-03-05 ENCOUNTER — HOSPITAL ENCOUNTER (INPATIENT)
Age: 88
LOS: 5 days | Discharge: NURSING, REHAB OR LONG-TERM CARE | End: 2023-03-10
Attending: STUDENT IN AN ORGANIZED HEALTH CARE EDUCATION/TRAINING PROGRAM | Admitting: FAMILY MEDICINE
Payer: MEDICARE

## 2023-03-05 ENCOUNTER — APPOINTMENT (OUTPATIENT)
Dept: GENERAL RADIOLOGY | Age: 88
End: 2023-03-05
Attending: STUDENT IN AN ORGANIZED HEALTH CARE EDUCATION/TRAINING PROGRAM
Payer: MEDICARE

## 2023-03-05 DIAGNOSIS — R65.20 SEPSIS WITH ACUTE HYPOXIC RESPIRATORY FAILURE WITHOUT SEPTIC SHOCK, DUE TO UNSPECIFIED ORGANISM (HCC): Primary | ICD-10-CM

## 2023-03-05 DIAGNOSIS — J96.01 SEPSIS WITH ACUTE HYPOXIC RESPIRATORY FAILURE WITHOUT SEPTIC SHOCK, DUE TO UNSPECIFIED ORGANISM (HCC): Primary | ICD-10-CM

## 2023-03-05 DIAGNOSIS — A41.9 SEPSIS WITH ACUTE HYPOXIC RESPIRATORY FAILURE WITHOUT SEPTIC SHOCK, DUE TO UNSPECIFIED ORGANISM (HCC): Primary | ICD-10-CM

## 2023-03-05 PROBLEM — N17.9 ACUTE KIDNEY INJURY SUPERIMPOSED ON CHRONIC KIDNEY DISEASE (HCC): Status: ACTIVE | Noted: 2023-03-05

## 2023-03-05 PROBLEM — R50.9 FEVER: Status: ACTIVE | Noted: 2023-03-05

## 2023-03-05 PROBLEM — N18.9 ACUTE KIDNEY INJURY SUPERIMPOSED ON CHRONIC KIDNEY DISEASE (HCC): Status: ACTIVE | Noted: 2023-03-05

## 2023-03-05 LAB
ALBUMIN SERPL-MCNC: 3.7 G/DL (ref 3.5–5)
ALBUMIN/GLOB SERPL: 0.8 (ref 1.1–2.2)
ALP SERPL-CCNC: 76 U/L (ref 45–117)
ALT SERPL-CCNC: 20 U/L (ref 12–78)
ANION GAP SERPL CALC-SCNC: 9 MMOL/L (ref 5–15)
APPEARANCE UR: CLEAR
AST SERPL-CCNC: 24 U/L (ref 15–37)
BACTERIA URNS QL MICRO: NEGATIVE /HPF
BASOPHILS # BLD: 0.2 K/UL (ref 0–0.1)
BASOPHILS NFR BLD: 1 % (ref 0–1)
BILIRUB SERPL-MCNC: 0.5 MG/DL (ref 0.2–1)
BILIRUB UR QL: NEGATIVE
BUN SERPL-MCNC: 26 MG/DL (ref 6–20)
BUN/CREAT SERPL: 17 (ref 12–20)
CALCIUM SERPL-MCNC: 9.6 MG/DL (ref 8.5–10.1)
CHLORIDE SERPL-SCNC: 96 MMOL/L (ref 97–108)
CO2 SERPL-SCNC: 29 MMOL/L (ref 21–32)
COLOR UR: YELLOW
CREAT SERPL-MCNC: 1.53 MG/DL (ref 0.55–1.02)
DIFFERENTIAL METHOD BLD: ABNORMAL
EOSINOPHIL # BLD: 1.1 K/UL (ref 0–0.4)
EOSINOPHIL NFR BLD: 7 % (ref 0–7)
EPITH CASTS URNS QL MICRO: ABNORMAL /LPF
ERYTHROCYTE [DISTWIDTH] IN BLOOD BY AUTOMATED COUNT: 12.6 % (ref 11.5–14.5)
GLOBULIN SER CALC-MCNC: 4.8 G/DL (ref 2–4)
GLUCOSE SERPL-MCNC: 120 MG/DL (ref 65–100)
GLUCOSE UR STRIP.AUTO-MCNC: NEGATIVE MG/DL
HCT VFR BLD AUTO: 42.7 % (ref 35–47)
HGB BLD-MCNC: 14 G/DL (ref 11.5–16)
HGB UR QL STRIP: NEGATIVE
IMM GRANULOCYTES # BLD AUTO: 0.2 K/UL (ref 0–0.04)
IMM GRANULOCYTES NFR BLD AUTO: 1 % (ref 0–0.5)
KETONES UR QL STRIP.AUTO: ABNORMAL MG/DL
LACTATE SERPL-SCNC: 1.9 MMOL/L (ref 0.4–2)
LEUKOCYTE ESTERASE UR QL STRIP.AUTO: NEGATIVE
LYMPHOCYTES # BLD: 2.1 K/UL (ref 0.8–3.5)
LYMPHOCYTES NFR BLD: 14 % (ref 12–49)
MCH RBC QN AUTO: 31 PG (ref 26–34)
MCHC RBC AUTO-ENTMCNC: 32.8 G/DL (ref 30–36.5)
MCV RBC AUTO: 94.7 FL (ref 80–99)
MONOCYTES # BLD: 1.7 K/UL (ref 0–1)
MONOCYTES NFR BLD: 11 % (ref 5–13)
NEUTS SEG # BLD: 9.8 K/UL (ref 1.8–8)
NEUTS SEG NFR BLD: 66 % (ref 32–75)
NITRITE UR QL STRIP.AUTO: NEGATIVE
NRBC # BLD: 0 K/UL (ref 0–0.01)
NRBC BLD-RTO: 0 PER 100 WBC
PH UR STRIP: 6 (ref 5–8)
PLATELET # BLD AUTO: 271 K/UL (ref 150–400)
PMV BLD AUTO: 10.5 FL (ref 8.9–12.9)
POTASSIUM SERPL-SCNC: 4.3 MMOL/L (ref 3.5–5.1)
PROT SERPL-MCNC: 8.5 G/DL (ref 6.4–8.2)
PROT UR STRIP-MCNC: NEGATIVE MG/DL
RBC # BLD AUTO: 4.51 M/UL (ref 3.8–5.2)
RBC #/AREA URNS HPF: ABNORMAL /HPF (ref 0–5)
RBC MORPH BLD: ABNORMAL
SARS-COV-2 RDRP RESP QL NAA+PROBE: NOT DETECTED
SODIUM SERPL-SCNC: 134 MMOL/L (ref 136–145)
SOURCE, COVRS: NORMAL
SP GR UR REFRACTOMETRY: 1.01 (ref 1–1.03)
TROPONIN I SERPL HS-MCNC: 15 NG/L (ref 0–51)
UA: UC IF INDICATED,UAUC: ABNORMAL
UROBILINOGEN UR QL STRIP.AUTO: 0.2 EU/DL (ref 0.2–1)
WBC # BLD AUTO: 15.1 K/UL (ref 3.6–11)
WBC URNS QL MICRO: ABNORMAL /HPF (ref 0–4)

## 2023-03-05 PROCEDURE — 87040 BLOOD CULTURE FOR BACTERIA: CPT

## 2023-03-05 PROCEDURE — 83605 ASSAY OF LACTIC ACID: CPT

## 2023-03-05 PROCEDURE — 99285 EMERGENCY DEPT VISIT HI MDM: CPT

## 2023-03-05 PROCEDURE — 83880 ASSAY OF NATRIURETIC PEPTIDE: CPT

## 2023-03-05 PROCEDURE — 65660000001 HC RM ICU INTERMED STEPDOWN

## 2023-03-05 PROCEDURE — 80053 COMPREHEN METABOLIC PANEL: CPT

## 2023-03-05 PROCEDURE — 74011250636 HC RX REV CODE- 250/636: Performed by: STUDENT IN AN ORGANIZED HEALTH CARE EDUCATION/TRAINING PROGRAM

## 2023-03-05 PROCEDURE — 93005 ELECTROCARDIOGRAM TRACING: CPT

## 2023-03-05 PROCEDURE — 85025 COMPLETE CBC W/AUTO DIFF WBC: CPT

## 2023-03-05 PROCEDURE — 36415 COLL VENOUS BLD VENIPUNCTURE: CPT

## 2023-03-05 PROCEDURE — 87635 SARS-COV-2 COVID-19 AMP PRB: CPT

## 2023-03-05 PROCEDURE — 74011000250 HC RX REV CODE- 250: Performed by: STUDENT IN AN ORGANIZED HEALTH CARE EDUCATION/TRAINING PROGRAM

## 2023-03-05 PROCEDURE — 74011250637 HC RX REV CODE- 250/637: Performed by: STUDENT IN AN ORGANIZED HEALTH CARE EDUCATION/TRAINING PROGRAM

## 2023-03-05 PROCEDURE — 96365 THER/PROPH/DIAG IV INF INIT: CPT

## 2023-03-05 PROCEDURE — 71045 X-RAY EXAM CHEST 1 VIEW: CPT

## 2023-03-05 PROCEDURE — 65270000029 HC RM PRIVATE

## 2023-03-05 PROCEDURE — 96366 THER/PROPH/DIAG IV INF ADDON: CPT

## 2023-03-05 PROCEDURE — 96375 TX/PRO/DX INJ NEW DRUG ADDON: CPT

## 2023-03-05 PROCEDURE — 84484 ASSAY OF TROPONIN QUANT: CPT

## 2023-03-05 PROCEDURE — 96361 HYDRATE IV INFUSION ADD-ON: CPT

## 2023-03-05 PROCEDURE — 81001 URINALYSIS AUTO W/SCOPE: CPT

## 2023-03-05 RX ORDER — LEVOFLOXACIN 5 MG/ML
750 INJECTION, SOLUTION INTRAVENOUS
Status: COMPLETED | OUTPATIENT
Start: 2023-03-05 | End: 2023-03-05

## 2023-03-05 RX ORDER — ACETAMINOPHEN 500 MG
1000 TABLET ORAL ONCE
Status: COMPLETED | OUTPATIENT
Start: 2023-03-05 | End: 2023-03-05

## 2023-03-05 RX ORDER — SODIUM CHLORIDE 0.9 % (FLUSH) 0.9 %
5-10 SYRINGE (ML) INJECTION AS NEEDED
Status: DISCONTINUED | OUTPATIENT
Start: 2023-03-05 | End: 2023-03-10 | Stop reason: HOSPADM

## 2023-03-05 RX ADMIN — ACETAMINOPHEN 1000 MG: 500 TABLET ORAL at 20:40

## 2023-03-05 RX ADMIN — CEFEPIME 2 G: 2 INJECTION, POWDER, FOR SOLUTION INTRAVENOUS at 20:37

## 2023-03-05 RX ADMIN — SODIUM CHLORIDE 500 ML: 9 INJECTION, SOLUTION INTRAVENOUS at 20:37

## 2023-03-05 RX ADMIN — LEVOFLOXACIN 750 MG: 5 INJECTION, SOLUTION INTRAVENOUS at 20:38

## 2023-03-06 LAB
BNP SERPL-MCNC: 2723 PG/ML (ref 0–450)
PROCALCITONIN SERPL-MCNC: 0.19 NG/ML

## 2023-03-06 PROCEDURE — 84145 PROCALCITONIN (PCT): CPT

## 2023-03-06 PROCEDURE — 74011250636 HC RX REV CODE- 250/636: Performed by: NURSE PRACTITIONER

## 2023-03-06 PROCEDURE — 74011250637 HC RX REV CODE- 250/637: Performed by: NURSE PRACTITIONER

## 2023-03-06 PROCEDURE — 65660000001 HC RM ICU INTERMED STEPDOWN

## 2023-03-06 PROCEDURE — 36415 COLL VENOUS BLD VENIPUNCTURE: CPT

## 2023-03-06 PROCEDURE — 74011000250 HC RX REV CODE- 250: Performed by: NURSE PRACTITIONER

## 2023-03-06 RX ORDER — SODIUM CHLORIDE 0.9 % (FLUSH) 0.9 %
5-40 SYRINGE (ML) INJECTION AS NEEDED
Status: DISCONTINUED | OUTPATIENT
Start: 2023-03-06 | End: 2023-03-10 | Stop reason: HOSPADM

## 2023-03-06 RX ORDER — ACETAMINOPHEN 325 MG/1
650 TABLET ORAL
Status: DISCONTINUED | OUTPATIENT
Start: 2023-03-06 | End: 2023-03-10 | Stop reason: HOSPADM

## 2023-03-06 RX ORDER — ENOXAPARIN SODIUM 100 MG/ML
30 INJECTION SUBCUTANEOUS EVERY 24 HOURS
Status: DISCONTINUED | OUTPATIENT
Start: 2023-03-07 | End: 2023-03-07

## 2023-03-06 RX ORDER — FUROSEMIDE 10 MG/ML
20 INJECTION INTRAMUSCULAR; INTRAVENOUS ONCE
Status: COMPLETED | OUTPATIENT
Start: 2023-03-06 | End: 2023-03-06

## 2023-03-06 RX ORDER — SPIRONOLACTONE 25 MG/1
12.5 TABLET ORAL DAILY
COMMUNITY
End: 2023-03-10

## 2023-03-06 RX ORDER — SPIRONOLACTONE 25 MG/1
12.5 TABLET ORAL DAILY
Status: DISCONTINUED | OUTPATIENT
Start: 2023-03-07 | End: 2023-03-10 | Stop reason: HOSPADM

## 2023-03-06 RX ORDER — ONDANSETRON 2 MG/ML
4 INJECTION INTRAMUSCULAR; INTRAVENOUS
Status: DISCONTINUED | OUTPATIENT
Start: 2023-03-06 | End: 2023-03-10 | Stop reason: HOSPADM

## 2023-03-06 RX ORDER — ONDANSETRON 4 MG/1
4 TABLET, ORALLY DISINTEGRATING ORAL
Status: DISCONTINUED | OUTPATIENT
Start: 2023-03-06 | End: 2023-03-10 | Stop reason: HOSPADM

## 2023-03-06 RX ORDER — ACETAMINOPHEN 650 MG/1
650 SUPPOSITORY RECTAL
Status: DISCONTINUED | OUTPATIENT
Start: 2023-03-06 | End: 2023-03-10 | Stop reason: HOSPADM

## 2023-03-06 RX ORDER — LOSARTAN POTASSIUM 50 MG/1
50 TABLET ORAL DAILY
Status: DISCONTINUED | OUTPATIENT
Start: 2023-03-07 | End: 2023-03-07

## 2023-03-06 RX ORDER — CARVEDILOL 3.12 MG/1
3.24 TABLET ORAL 2 TIMES DAILY
Status: DISCONTINUED | OUTPATIENT
Start: 2023-03-06 | End: 2023-03-08

## 2023-03-06 RX ORDER — POLYETHYLENE GLYCOL 3350 17 G/17G
17 POWDER, FOR SOLUTION ORAL DAILY PRN
Status: DISCONTINUED | OUTPATIENT
Start: 2023-03-06 | End: 2023-03-10 | Stop reason: HOSPADM

## 2023-03-06 RX ORDER — ENOXAPARIN SODIUM 100 MG/ML
40 INJECTION SUBCUTANEOUS DAILY
Status: DISCONTINUED | OUTPATIENT
Start: 2023-03-07 | End: 2023-03-06 | Stop reason: DRUGHIGH

## 2023-03-06 RX ORDER — LEVOFLOXACIN 5 MG/ML
750 INJECTION, SOLUTION INTRAVENOUS
Status: DISCONTINUED | OUTPATIENT
Start: 2023-03-07 | End: 2023-03-07

## 2023-03-06 RX ORDER — EPINEPHRINE 0.22MG
AEROSOL WITH ADAPTER (ML) INHALATION
COMMUNITY

## 2023-03-06 RX ORDER — SODIUM CHLORIDE 0.9 % (FLUSH) 0.9 %
5-40 SYRINGE (ML) INJECTION EVERY 8 HOURS
Status: DISCONTINUED | OUTPATIENT
Start: 2023-03-06 | End: 2023-03-10 | Stop reason: HOSPADM

## 2023-03-06 RX ADMIN — CARVEDILOL 3.12 MG: 3.12 TABLET, FILM COATED ORAL at 18:49

## 2023-03-06 RX ADMIN — FUROSEMIDE 20 MG: 10 INJECTION, SOLUTION INTRAVENOUS at 15:22

## 2023-03-06 RX ADMIN — SODIUM CHLORIDE, PRESERVATIVE FREE 10 ML: 5 INJECTION INTRAVENOUS at 15:22

## 2023-03-06 RX ADMIN — SODIUM CHLORIDE, PRESERVATIVE FREE 10 ML: 5 INJECTION INTRAVENOUS at 21:02

## 2023-03-06 NOTE — ED NOTES
Emergency Room Nursing Communication Tool        Bedside and Verbal shift change report given to Jaun Duane, RN (incoming nurse) by Akbar Cardoza RN (outgoing nurse) on Elviswyn Minus a 80 y.o. female and born 4/18/1923 who arrived at the hospital on 3/5/2023  8:03 PM. Report included the following information SBAR, K[place], [place], and Recent Results. Significant changes during shift: Awaiting for bed placement to [facility].       Issues for physician to address: none            Code Status: Prior     Chief Complaint: Fever     Admit Diagnosis: Sepsis with acute hypoxic respiratory failure without septic shock, due to unspecified organism ([facility] [A41.9, R65.20, J96.01]  Acute kidney injury superimposed on chronic kidney disease ([facility] [N17.9, N18.9]  Fever [R50.9]     Admitting Provider: Jonah Funk MD     Surgery: * No surgery found *     Infections: No current active infections     Allergies: Pcn [penicillins]     Current diet: No diet orders on file     Lines:   Peripheral IV 03/05/23 Left Antecubital (Active)       Peripheral IV 03/05/23 Right Wrist (Active)   Site Assessment Clean, dry, & intact 03/05/23 2056   Phlebitis Assessment 0 03/05/23 2056   Infiltration Assessment 0 03/05/23 2056                Vital Signs:   Patient Vitals for the past 12 hrs:   Temp Pulse Resp BP SpO2   03/06/23 0600 -- (!) 106 26 (!) 148/79 100 %   03/06/23 0500 -- 84 24 (!) 110/50 100 %   03/06/23 0400 -- 83 -- (!) 142/80 96 %   03/06/23 0300 -- 79 24 (!) 118/97 99 %   03/06/23 0200 -- 86 17 101/71 98 %   03/06/23 0100 -- (!) 102 28 (!) 125/92 92 %   03/06/23 0000 -- 84 27 (!) 119/51 96 %   03/05/23 2330 -- 79 27 108/65 94 %   03/05/23 2300 -- 81 23 116/65 95 %   03/05/23 2230 -- 95 30 -- 95 %   03/05/23 2200 -- (!) 101 27 (!) 139/59 94 %   03/05/23 2100 -- 75 (!) 39 (!) 164/84 98 %   03/05/23 2026 -- 72 (!) 36 (!) 172/105 96 %   03/05/23 2016 -- 84 21 (!) 182/87 95 %   03/05/23 2015 -- -- -- -- (!) 88 % 03/05/23 2014 (!) 100.7 °F (38.2 °C) (!) 112 24 (!) 183/117 93 %      Intake & Output:     Intake/Output Summary (Last 24 hours) at 3/6/2023 0732  Last data filed at 3/6/2023 0259  Gross per 24 hour   Intake 500 ml   Output --   Net 500 ml      Laboratory Results:     Recent Results (from the past 12 hour(s))   EKG, 12 LEAD, INITIAL    Collection Time: 03/05/23  8:19 PM   Result Value Ref Range    Ventricular Rate 99 BPM    Atrial Rate 93 BPM    QRS Duration 78 ms    Q-T Interval 366 ms    QTC Calculation (Bezet) 469 ms    Calculated R Axis 59 degrees    Calculated T Axis 30 degrees    Diagnosis       Atrial fibrillation  Low voltage QRS  Septal infarct (cited on or before 19-JUL-2020)  Abnormal ECG  When compared with ECG of 08-MAY-2022 08:58,  QRS duration has decreased  Questionable change in initial forces of Septal leads  ST now depressed in Anterior leads     NT-PRO BNP    Collection Time: 03/05/23  8:26 PM   Result Value Ref Range    NT pro-BNP 2,723 (H) 0 - 450 PG/ML   CULTURE, BLOOD    Collection Time: 03/05/23  8:27 PM    Specimen: Blood   Result Value Ref Range    Special Requests: NO SPECIAL REQUESTS      Culture result: NO GROWTH <24 HRS     CULTURE, BLOOD    Collection Time: 03/05/23  8:27 PM    Specimen: Blood   Result Value Ref Range    Special Requests: NO SPECIAL REQUESTS      Culture result: NO GROWTH <24 HRS     CBC WITH AUTOMATED DIFF    Collection Time: 03/05/23  8:27 PM   Result Value Ref Range    WBC 15.1 (H) 3.6 - 11.0 K/uL    RBC 4.51 3.80 - 5.20 M/uL    HGB 14.0 11.5 - 16.0 g/dL    HCT 42.7 35.0 - 47.0 %    MCV 94.7 80.0 - 99.0 FL    MCH 31.0 26.0 - 34.0 PG    MCHC 32.8 30.0 - 36.5 g/dL    RDW 12.6 11.5 - 14.5 %    PLATELET 719 930 - 233 K/uL    MPV 10.5 8.9 - 12.9 FL    NRBC 0.0 0  WBC    ABSOLUTE NRBC 0.00 0.00 - 0.01 K/uL    NEUTROPHILS 66 32 - 75 %    LYMPHOCYTES 14 12 - 49 %    MONOCYTES 11 5 - 13 %    EOSINOPHILS 7 0 - 7 %    BASOPHILS 1 0 - 1 %    IMMATURE GRANULOCYTES 1 (H) 0.0 - 0.5 %    ABS. NEUTROPHILS 9.8 (H) 1.8 - 8.0 K/UL    ABS. LYMPHOCYTES 2.1 0.8 - 3.5 K/UL    ABS. MONOCYTES 1.7 (H) 0.0 - 1.0 K/UL    ABS. EOSINOPHILS 1.1 (H) 0.0 - 0.4 K/UL    ABS. BASOPHILS 0.2 (H) 0.0 - 0.1 K/UL    ABS. IMM. GRANS. 0.2 (H) 0.00 - 0.04 K/UL    DF SMEAR SCANNED      RBC COMMENTS NORMOCYTIC, NORMOCHROMIC     METABOLIC PANEL, COMPREHENSIVE    Collection Time: 03/05/23  8:27 PM   Result Value Ref Range    Sodium 134 (L) 136 - 145 mmol/L    Potassium 4.3 3.5 - 5.1 mmol/L    Chloride 96 (L) 97 - 108 mmol/L    CO2 29 21 - 32 mmol/L    Anion gap 9 5 - 15 mmol/L    Glucose 120 (H) 65 - 100 mg/dL    BUN 26 (H) 6 - 20 MG/DL    Creatinine 1.53 (H) 0.55 - 1.02 MG/DL    BUN/Creatinine ratio 17 12 - 20      eGFR 30 (L) >60 ml/min/1.73m2    Calcium 9.6 8.5 - 10.1 MG/DL    Bilirubin, total 0.5 0.2 - 1.0 MG/DL    ALT (SGPT) 20 12 - 78 U/L    AST (SGOT) 24 15 - 37 U/L    Alk.  phosphatase 76 45 - 117 U/L    Protein, total 8.5 (H) 6.4 - 8.2 g/dL    Albumin 3.7 3.5 - 5.0 g/dL    Globulin 4.8 (H) 2.0 - 4.0 g/dL    A-G Ratio 0.8 (L) 1.1 - 2.2     TROPONIN-HIGH SENSITIVITY    Collection Time: 03/05/23  8:27 PM   Result Value Ref Range    Troponin-High Sensitivity 15 0 - 51 ng/L   URINALYSIS W/ REFLEX CULTURE    Collection Time: 03/05/23  8:27 PM    Specimen: Urine   Result Value Ref Range    Color YELLOW      Appearance CLEAR CLEAR      Specific gravity 1.010 1.003 - 1.030      pH (UA) 6.0 5.0 - 8.0      Protein Negative NEG mg/dL    Glucose Negative NEG mg/dL    Ketone TRACE (A) NEG mg/dL    Bilirubin Negative NEG      Blood Negative NEG      Urobilinogen 0.2 0.2 - 1.0 EU/dL    Nitrites Negative NEG      Leukocyte Esterase Negative NEG      WBC 0-4 0 - 4 /hpf    RBC 0-5 0 - 5 /hpf    Epithelial cells FEW FEW /lpf    Bacteria Negative NEG /hpf    UA:UC IF INDICATED CULTURE NOT INDICATED BY UA RESULT     LACTIC ACID    Collection Time: 03/05/23  8:28 PM   Result Value Ref Range    Lactic acid 1.9 0.4 - 2.0 MMOL/L COVID-19 RAPID TEST    Collection Time: 03/05/23 10:30 PM   Result Value Ref Range    Specimen source Nasopharyngeal      COVID-19 rapid test Not detected NOTD              Opportunity for questions and clarifications were given to the incoming nurse. Patient's bed locked and is in low position, side rails up x2, door open PRN, call bell within reach of patient and patient not in distress.       Signed by: Ace Martinez RN, PETR, BSN, VIA [facility]                       3/6/2023 at 7:32 AM

## 2023-03-06 NOTE — ED NOTES
Patient not willing/not understanding use of purewick. Patient crying and wanting to get up to bathroom. Patient required maximum assistance from 2 RNS to transfer to bedside commode. Patient extremely unsteady. Patient voided 150cc of urine. Pericare performed and brief placed back on patient. It is not recommended to get patient back up to bedside commode due to patient high fall risk and safety. New purewick placed and patient reeducated on its use.

## 2023-03-06 NOTE — ED PROVIDER NOTES
80-year-old female with history of A-fib, CHF, HTN, TIA, dementia presents to the ED via EMS from nursing facility with chief complaint of fever. Reported having fever this evening with associated generalized weakness and confusion. Patient currently being treated for UTI with Macrobid, on day 6 of 7. Per EMS patient with temperature of 102.1 on route. No treatment prior to arrival.  Patient denies any headache, chest pain, abdominal pain, urinary symptoms, bowel symptoms. Does have congestion and cough. Is not on oxygen at home. The history is provided by the patient and the EMS personnel. Fever   Pertinent negatives include no chest pain and no shortness of breath. Past Medical History:   Diagnosis Date    A-fib ([facility] [address]     CHF (congestive heart failure) ([facility])     EF of 55-60%    Hypertension     TIA (transient ischemic attack)        Past Surgical History:   Procedure Laterality Date    HX CATARACT REMOVAL      HX TONSILLECTOMY           No family history on file.     Social History     Socioeconomic History    Marital status:      Spouse name: Not on file    Number of children: Not on file    Years of education: Not on file    Highest education level: Not on file   Occupational History    Not on file   Tobacco Use    Smoking status: Former     Years: 30.00     Types: Cigarettes    Smokeless tobacco: Never   Substance and Sexual Activity    Alcohol use: Not Currently    Drug use: Not on file    Sexual activity: Not on file   Other Topics Concern    Not on file   Social History Narrative    Not on file     Social Determinants of Health     Financial Resource Strain: Not on file   Food Insecurity: Not on file   Transportation Needs: Not on file   Physical Activity: Not on file   Stress: Not on file   Social Connections: Not on file   Intimate Partner Violence: Not on file   Housing Stability: Not on file         ALLERGIES: Pcn [penicillins]    Review of Systems   Constitutional:  Positive for fever.   Respiratory:  Negative for shortness of breath. Cardiovascular:  Negative for chest pain. Vitals:    03/05/23 2014 03/05/23 2016 03/05/23 2026   BP: (!) 183/117 (!) 182/87 (!) 172/105   Pulse: (!) 112 84 72   Resp: 24 21 (!) 36   Temp: (!) 100.7 °F (38.2 °C)     SpO2: 93% 95% 96%   Weight: 65 kg (143 lb 4.8 oz)              Physical Exam  Constitutional:       General: She is not in acute distress. Appearance: She is well-developed. HENT:      Head: Normocephalic and atraumatic. Eyes:      General: No scleral icterus. Pupils: Pupils are equal, round, and reactive to light. Neck:      Trachea: No tracheal deviation. Cardiovascular:      Rate and Rhythm: Tachycardia present. Rhythm irregular. Heart sounds: No murmur heard. No friction rub. No gallop. Pulmonary:      Effort: No respiratory distress. Breath sounds: No wheezing or rales. Comments: +tachypnea, +coarse BL breath sounds  Abdominal:      General: Bowel sounds are normal. There is no distension. Palpations: Abdomen is soft. Tenderness: There is no abdominal tenderness. Musculoskeletal:         General: No deformity. Cervical back: Neck supple. Skin:     General: Skin is warm and dry. Neurological:      Mental Status: She is alert and oriented to person, place, and time. Psychiatric:         Behavior: Behavior normal.        Medical Decision Making  80year-old female presenting to the ED with fever, cough. Satting around 90% on room air, improved with 2 L nasal cannula. Febrile to 100.7 in the ED and mildly tachycardic in A-fib. Differential includes sepsis, pneumonia, UTI. Will treat empirically with antibiotics, draw blood cultures, UA, basic labs, lactic acid. Will check chest x-ray as well. Given history of heart failure and mild hypoxia will defer 30 cc/kg bolus and instead opt for 500 cc bolus.   Patient will require admission to the hospitalist.    Amount and/or Complexity of Data Reviewed  Labs: ordered. Radiology: ordered and independent interpretation performed. Details: BL interstitial edema on chest XR  ECG/medicine tests: ordered and independent interpretation performed. Decision-making details documented in ED Course. Risk  OTC drugs. Prescription drug management. Decision regarding hospitalization. ED Course as of 03/05/23 2204   Marianela Pierce Mar 05, 2023   2046 ED EKG interpretation:8:46 PM  Rhythm: atrial fib;  Rate (approx.): 99; Axis: normal; QRS interval: normal ; ST/T wave: non-specific changes; Other findings: abnormal ekg. [JW]      ED Course User Index  [JW] Kermit Hallman MD       Procedures    10:04 PM    Admission Note:  Patient is being admitted to the hospital by Dr. Abisai Gill, Service: Hospitalist.  The results of their tests and reasons for their admission have been discussed with them and available family. They convey agreement and understanding for the need to be admitted and for their admission diagnosis. LABORATORY TESTS:  Labs Reviewed   CBC WITH AUTOMATED DIFF - Abnormal; Notable for the following components:       Result Value    WBC 15.1 (*)     IMMATURE GRANULOCYTES 1 (*)     ABS. NEUTROPHILS 9.8 (*)     ABS. MONOCYTES 1.7 (*)     ABS. EOSINOPHILS 1.1 (*)     ABS. BASOPHILS 0.2 (*)     ABS. IMM.  GRANS. 0.2 (*)     All other components within normal limits   METABOLIC PANEL, COMPREHENSIVE - Abnormal; Notable for the following components:    Sodium 134 (*)     Chloride 96 (*)     Glucose 120 (*)     BUN 26 (*)     Creatinine 1.53 (*)     eGFR 30 (*)     Protein, total 8.5 (*)     Globulin 4.8 (*)     A-G Ratio 0.8 (*)     All other components within normal limits   URINALYSIS W/ REFLEX CULTURE - Abnormal; Notable for the following components:    Ketone TRACE (*)     All other components within normal limits   CULTURE, BLOOD   CULTURE, BLOOD   COVID-19 RAPID TEST   TROPONIN-HIGH SENSITIVITY   LACTIC ACID       IMAGING RESULTS:  XR CHEST PORT    Result Date: 3/5/2023  INDICATION:  Eval for Infiltrate EXAM: Chest single view. COMPARISON: 5/8/2022. FINDINGS: A single frontal view of the chest at 2050 hours shows low lung volumes and diffuse interstitial infiltrate new since the prior study. Possible small pleural effusions. .  The heart, mediastinum and pulmonary vasculature are stable with cardiomegaly . The bony thorax is unremarkable for age. .     Interstitial edema pattern. .  . MEDICATIONS GIVEN:  Medications   sodium chloride (NS) flush 5-10 mL (has no administration in time range)   levoFLOXacin (LEVAQUIN) 750 mg in D5W IVPB (750 mg IntraVENous New Bag 3/5/23 2038)   acetaminophen (TYLENOL) tablet 1,000 mg (1,000 mg Oral Given 3/5/23 2040)   cefepime (MAXIPIME) 2 g in 0.9% sodium chloride 10 mL IV syringe (2 g IntraVENous Given 3/5/23 2037)   sodium chloride 0.9 % bolus infusion 500 mL (500 mL IntraVENous New Bag 3/5/23 2037)       IMPRESSION:  1. Sepsis with acute hypoxic respiratory failure without septic shock, due to unspecified organism [facility])        PLAN:  - Admit to hospitalist    CONDITION:  stable    Hospitalist Perfect Serve for Admission  10:04 PM    ED Room Number: SER07/07  Patient Name and age:  Pop Gruber 80 y.o.  female  Working Diagnosis:   1. Sepsis with acute hypoxic respiratory failure without septic shock, due to unspecified organism ([facility] [address]        COVID-19 Suspicion:  yes    Code Status:  Full Code  Readmission: no  Isolation Requirements:  yes  Recommended Level of Care:  telemetry  Department:[place] [facility] - [phone]  Other: Fever and weakness starting today. Mildly tachypneic and hypoxic to 90% on room air. Temp of 38.2 here. Is on day 6 of 7 of Macrobid for UTI. Urine clear, chest x-ray with bilateral interstitial edema pattern. Treated with broad-spectrum antibiotics, cautious fluids, Tylenol. Neurologically intact. White count 15, lactate 1.9. COVID pending.     Signed By: Nina Merlos MD March 5, 2023

## 2023-03-06 NOTE — ED NOTES
Verbal report and admission paperwork given to [facility] providers; time allotted for questions but denied having any at this time. Pt transported [address] of ED [address] [place] [address].

## 2023-03-06 NOTE — ROUTINE PROCESS
TRANSFER - OUT REPORT:    Verbal report given to Lizbeth Castillo\[facility] [place] [facility]\"" [place] (name) on Daron Left  being transferred to [facility] (unit) for routine progression of care       Report consisted of patients Situation, Background, Assessment and Recommendations(SBAR). Information from the following report(s) ED Summary, MAR, Recent Results, and Cardiac Rhythm AFIB  was reviewed with the receiving nurse. Lines:   Peripheral IV 03/05/23 Left Antecubital (Active)   Site Assessment Clean, dry, & intact 03/06/23 0751   Phlebitis Assessment 0 03/06/23 0751   Infiltration Assessment 0 03/06/23 0751   Dressing Status Clean, dry, & intact 03/06/23 0751   Dressing Type Tape;Transparent 03/06/23 0751   Hub Color/Line Status Pink;Flushed 03/06/23 0751       Peripheral IV 03/05/23 Right Wrist (Active)   Site Assessment Clean, dry, & intact 03/06/23 0754   Phlebitis Assessment 0 03/06/23 0754   Infiltration Assessment 0 03/06/23 0754   Dressing Status Intact; Old drainage 03/06/23 0754   Dressing Type Transparent;Tape 03/06/23 0754   Hub Color/Line Status Blue;Flushed 03/06/23 0754        Opportunity for questions and clarification was provided.       Patient transported with:  Monitor  O2 @ 2 liters

## 2023-03-06 NOTE — PROGRESS NOTES
Pharmacy Note - Levofloxacin    750 mg Levofloxacin IVPB q 24 h ordered for treatment of Pneumonia (CAP). Per [facility] Renal Policy, Levofloxacin will be changed to 750 mg IVPB q 48 h    Initial dose r'cd 3/5/23  @ . Next dose 3/7/23 @     Estimated Creatinine Clearance: CrCl cannot be calculated (Unknown ideal weight. ). based on 65 kg recorded on 3/5 CrCl ~20 ml/min  Dialysis Status, SOHAIL, CKD: n/a    BMI:  Body mass index is 23.13 kg/m². Recent Labs     23   WBC 15.1*     Temp (24hrs), Av.8 °F (37.7 °C), Min:99.2 °F (37.3 °C), Max:100.7 °F (38.2 °C)      Rationale for Adjustment:  Renal dosing is because drug is renally eliminated. CrCl ~ 20 ml/min     Pharmacy will continue to monitor and adjust dose as necessary. Please call Inpatient Pharmacy with any questions.     Thank you,  Tomas Vela MS R.PH

## 2023-03-06 NOTE — ED NOTES
Checked on patient and repositioned her ECG electrodes, gave patient a blanket and tried to make her more comfortable. Lights dimmed in the room. Visitor in room with patient.

## 2023-03-06 NOTE — H&P
History and Physical    Date of Service:  3/6/2023  Primary Care Provider: Hamlet Mccray MD  Source of information: The patient and Chart review    Chief Complaint: Fever      History of Presenting Illness:   Kirby Kee is a 80 y.o. female who presents with cough and fever. She was febrile to 102.1. She has a history of atrial fibrillation, reported CHF, hypertension and dementia. Normally lives at [facility]. She was brought to freestanding emergency department where work-up included chest radiograph which showed interstitial edema, elevated BNP, white count of 15. She was given a dose of levofloxacin and referred to the hospitalist service at our facility for further management. At the moment of the initial interview she was oriented however very poor memory. She did have cough however states that she felt that her breathing was fine. She remains on 2 L of supplemental oxygen. She was afebrile at the moment of the encounter, speaking in complete sentences. REVIEW OF SYSTEMS:  A comprehensive review of systems was negative except for that written in the History of Present Illness. Past Medical History:   Diagnosis Date    A-fib ([facility] [address]     CHF (congestive heart failure) (HCC)     EF of 55-60%    Hypertension     TIA (transient ischemic attack)       Past Surgical History:   Procedure Laterality Date    HX CATARACT REMOVAL      HX TONSILLECTOMY       Prior to Admission medications    Medication Sig Start Date End Date Taking? Authorizing Provider   calcium polycarbophiL (FIBERCON) 625 mg tablet Take 625 mg by mouth two (2) times a day. 7/25/20   Provider, Historical   ubidecarenone/vitamin E mixed (COQ10  PO) Take 100 mg by mouth daily. 7/25/20   Provider, Historical   ascorbic acid, vitamin C, (VITAMIN C) 500 mg tablet Take 500 mg by mouth daily. 7/25/20   Provider, Historical   cholecalciferol (Vitamin D3) (5000 Units/125 mcg) tab tablet Take 5,000 Units by mouth daily. 7/25/20   Provider, Historical   cranberry 500 mg capsule Take 500 mg by mouth daily. 7/25/20   Provider, Historical   multivit-min/ferrous fumarate (MULTI VITAMIN PO) Take 1 Tab by mouth daily. 7/25/20   Provider, Historical   Lactobacillus acidophilus (Probiotic) 10 billion cell cap Take 1 Tab by mouth daily. 7/25/20   Provider, Historical   irbesartan (AVAPRO) 300 mg tablet Take 150 mg by mouth daily. 8/1/20   Other, MD Sharon   carvediloL (COREG) 12.5 mg tablet Take 3.24 mg by mouth two (2) times a day. 8/1/20   Other, MD Sharon   rivaroxaban (Xarelto) 15 mg tab tablet Take 15 mg by mouth daily. Other, MD Sharon     Allergies   Allergen Reactions    Pcn [Penicillins] Hives     In childhood        No family history on file. Social History:  reports that she has quit smoking. She has never used smokeless tobacco. She reports that she does not currently use alcohol. Social Determinants of Health     Tobacco Use: Not on file   Alcohol Use: Not on file   Financial Resource Strain: Not on file   Food Insecurity: Not on file   Transportation Needs: Not on file   Physical Activity: Not on file   Stress: Not on file   Social Connections: Not on file   Intimate Partner Violence: Not on file   Depression: Not on file   Housing Stability: Not on file        Medications were reconciled to the best of my ability given all available resources at the time of admission. Route is PO if not otherwise noted. Family and social history were personally reviewed, all pertinent and relevant details are outlined as above.     Objective:   Visit Vitals  BP (!) 147/58 (BP 1 Location: Right upper arm, BP Patient Position: At rest)   Pulse 99   Temp 99.4 °F (37.4 °C)   Resp 24   Ht 5' 5.98\" (1.676 m)   Wt 65 kg (143 lb 4.8 oz)   SpO2 97%   BMI 23.14 kg/m²    O2 Flow Rate (L/min): 2 l/min O2 Device: Nasal cannula    PHYSICAL EXAM:   General: Alert x oriented x 3, awake, no acute distress,   HEENT: PEERL, EOMI, moist mucus membranes  Neck: Supple, no JVD, no meningeal signs  Chest: Clear to auscultation bilaterally   CVS: RRR, S1 S2 heard, no murmurs/rubs/gallops  Abd: Soft, non-tender, non-distended, +bowel sounds   Ext: No clubbing, no cyanosis, no edema  Neuro/Psych: Pleasant mood and affect,  Cap refill: Brisk, less than 3 seconds  Pulses: 2+, symmetric in all extremities  Skin: Warm, dry, without rashes or lesions    Data Review:   I have independently reviewed and interpreted patient's lab and all other diagnostic data    Abnormal Labs Reviewed   CBC WITH AUTOMATED DIFF - Abnormal; Notable for the following components:       Result Value    WBC 15.1 (*)     IMMATURE GRANULOCYTES 1 (*)     ABS. NEUTROPHILS 9.8 (*)     ABS. MONOCYTES 1.7 (*)     ABS. EOSINOPHILS 1.1 (*)     ABS. BASOPHILS 0.2 (*)     ABS. IMM.  GRANS. 0.2 (*)     All other components within normal limits   METABOLIC PANEL, COMPREHENSIVE - Abnormal; Notable for the following components:    Sodium 134 (*)     Chloride 96 (*)     Glucose 120 (*)     BUN 26 (*)     Creatinine 1.53 (*)     eGFR 30 (*)     Protein, total 8.5 (*)     Globulin 4.8 (*)     A-G Ratio 0.8 (*)     All other components within normal limits   URINALYSIS W/ REFLEX CULTURE - Abnormal; Notable for the following components:    Ketone TRACE (*)     All other components within normal limits   NT-PRO BNP - Abnormal; Notable for the following components:    NT pro-BNP 2,723 (*)     All other components within normal limits       All Micro Results       Procedure Component Value Units Date/Time    CULTURE, BLOOD [[id number]] Collected: 03/05/23 2027    Order Status: Completed Specimen: Blood Updated: 03/05/23 2323     Special Requests: NO SPECIAL REQUESTS        Culture result: NO GROWTH <24 HRS       CULTURE, BLOOD [[id number]] Collected: 03/05/23 2027    Order Status: Completed Specimen: Blood Updated: 03/05/23 2322     Special Requests: NO SPECIAL REQUESTS        Culture result: NO GROWTH <24 HRS COVID-19 RAPID TEST [[id number]] Collected: 03/05/23 2230    Order Status: Completed Specimen: Nasopharyngeal Updated: 03/05/23 2252     Specimen source Nasopharyngeal        COVID-19 rapid test Not detected        Comment: Rapid Abbott ID Now       Rapid NAAT:  The specimen is NEGATIVE for SARS-CoV-2, the novel coronavirus associated with COVID-19. Negative results should be treated as presumptive and, if inconsistent with clinical signs and symptoms or necessary for patient management, should be tested with an alternative molecular assay. Negative results do not preclude SARS-CoV-2 infection and should not be used as the sole basis for patient management decisions. This test has been authorized by the FDA under an Emergency Use Authorization (EUA) for use by authorized laboratories. Fact sheet for Healthcare Providers:  http://www.balbir.venancio/  Fact sheet for Patients: http://www.balbir.venancio/       Methodology: Isothermal Nucleic Acid Amplification                 IMAGING:   XR CHEST PORT   Final Result   Interstitial edema pattern. .  . ECG/ECHO:    Results for orders placed or performed during the hospital encounter of 03/05/23   EKG, 12 LEAD, INITIAL   Result Value Ref Range    Ventricular Rate 99 BPM    Atrial Rate 93 BPM    QRS Duration 78 ms    Q-T Interval 366 ms    QTC Calculation (Bezet) 469 ms    Calculated R Axis 59 degrees    Calculated T Axis 30 degrees    Diagnosis       Atrial fibrillation  Low voltage QRS  Septal infarct (cited on or before 19-JUL-2020)  Abnormal ECG  When compared with ECG of 08-MAY-2022 08:58,  QRS duration has decreased  Questionable change in initial forces of Septal leads  ST now depressed in Anterior leads            Notes reviewed from all clinical/nonclinical/nursing services involved in patient's clinical care.  Care coordination discussions were held with appropriate clinical/nonclinical/ nursing providers based on care coordination needs. Assessment:   Given the patient's current clinical presentation, there is a high level of concern for decompensation if discharged from the emergency department. Complex decision making was performed, which includes reviewing the patient's available past medical records, laboratory results, and imaging studies. Active Problems:    Fever (3/5/2023)      Acute kidney injury superimposed on chronic kidney disease ([facility] [address] (3/5/2023)      Sepsis with acute hypoxic respiratory failure without septic shock, due to unspecified organism [facility]) (3/5/2023)        Plan:     Sepsis  Unclear etiology or source. Did have leukocytosis and fever  Pulmonary edema noted on chest radiograph however no clear infiltrate  We will check a procalcitonin  Urinalysis unremarkable  We will diurese as I mentioned below and follow-up with another radiograph  In the meantime continuing antibiotics in the form of levofloxacin, will discontinue if procalcitonin is normal and no pneumonia noted on follow-up radiograph    Acute diastolic CHF  Reportedly with diastolic heart failure, unclear NYHA class  Radiograph with pulmonary edema and elevated BNP  I did do point-of-care ultrasound, unable to obtain adequate cardiac windows however ultrasound of the lungs were with diffuse B-lines especially in the bases  Continuing carvedilol  We will give dose of Lasix  We will update echocardiogram    Acute hypoxic respiratory failure  Likely due to the above. Currently on 2 L/min of supplemental oxygen via nasal cannula. Continuing the aforementioned treatments  Wean oxygen as tolerated    Hypertension  Blood pressure mildly elevated  Continuing carvedilol and ARB    Dementia  Patient is oriented, does have poor memory however  Continuing supportive care    Atrial fibrillation  Reported history of atrial fibrillation  Currently in sinus rhythm  Continuing beta-blocker    DIET: ADULT DIET Regular;  Low Sodium (2 gm) ISOLATION PRECAUTIONS: There are currently no Active Isolations  CODE STATUS: Full Code   DVT PROPHYLAXIS: Lovenox  FUNCTIONAL STATUS PRIOR TO HOSPITALIZATION: Ambulatory and capable of self-care but relies on assistive devices (rolling walker/cane). Ambulatory status/function: By self   EARLY MOBILITY ASSESSMENT: Recommend an assessment from physical therapy and/or occupational therapy  ANTICIPATED DISCHARGE: 24-48 hours. ANTICIPATED DISPOSITION: SNF  EMERGENCY CONTACT/SURROGATE DECISION MAKER: Daughter    CRITICAL CARE WAS PERFORMED FOR THIS ENCOUNTER: NO.      Signed By: Laureano Jackson NP     March 6, 2023         Please note that this dictation may have been completed with Dragon, the CredSimple voice recognition software. Quite often unanticipated grammatical, syntax, homophones, and other interpretive errors are inadvertently transcribed by the computer software. Please disregard these errors. Please excuse any errors that have escaped final proofreading.

## 2023-03-06 NOTE — PROGRESS NOTES
Problem: Pressure Injury - Risk of  Goal: *Prevention of pressure injury  Description: Document Reji Scale and appropriate interventions in the flowsheet. Outcome: Progressing Towards Goal  Note: Pressure Injury Interventions:       Moisture Interventions: Absorbent underpads, Internal/External urinary devices, Limit adult briefs, Minimize layers    Activity Interventions: Increase time out of bed, Pressure redistribution bed/mattress(bed type), PT/OT evaluation    Mobility Interventions: HOB 30 degrees or less, Pressure redistribution bed/mattress (bed type), PT/OT evaluation    Nutrition Interventions: Document food/fluid/supplement intake    Friction and Shear Interventions: HOB 30 degrees or less, Minimize layers                Problem: Patient Education: Go to Patient Education Activity  Goal: Patient/Family Education  Outcome: Progressing Towards Goal     Problem: Falls - Risk of  Goal: *Absence of Falls  Description: Document Janet Fall Risk and appropriate interventions in the flowsheet.   Outcome: Progressing Towards Goal  Note: Fall Risk Interventions:                                Problem: Patient Education: Go to Patient Education Activity  Goal: Patient/Family Education  Outcome: Progressing Towards Goal

## 2023-03-06 NOTE — ED NOTES
Verbal shift change report given to Jr Ricks RN  (oncoming nurse) by Magali Gardner RN  (offgoing nurse). Report included the following information SBAR, ED Summary, MAR, Recent Results, and Cardiac Rhythm NSR/Sinus Tach .

## 2023-03-06 NOTE — PROGRESS NOTES
Pharmacist Review and Automatic Dose Adjustment of Prophylactic Enoxaparin    The reviewing pharmacist has made an adjustment to the ordered enoxaparin dose or converted to UFH per the approved [facility] protocol and table as identified below. Theresa Chua is a 80 y.o. female. No lab exists for component: CREATININE    CrCl cannot be calculated (Unknown ideal weight.).  ( ~ 20 ml/min)    Height:   Ht Readings from Last 1 Encounters:   07/25/20 167.6 cm (66\")     Weight:  Wt Readings from Last 1 Encounters:   03/05/23 65 kg (143 lb 4.8 oz)               Plan: Based upon the patient's weight and renal function, the ordered enoxaparin dose of 40 mg Q24H has been changed/converted to 30 mg Q24H      Thank you,  Estela Dickson, PHARMD

## 2023-03-06 NOTE — PROGRESS NOTES
1530: This RN made 2 unsuccessful attempts at drawing pt's procalcitonin labs. Called charge RN to get the blood draw. NP ordered ART stick w/ possible midline or endurance catheter later on.

## 2023-03-06 NOTE — ED TRIAGE NOTES
Pt noted to have a fever at [facility]. Pt is reported to be under treatment for UTI on day 6 of ABX. Pt febrile on arrival. Notable wheezing and congested cough. Dementia at baseline.

## 2023-03-07 ENCOUNTER — APPOINTMENT (OUTPATIENT)
Dept: GENERAL RADIOLOGY | Age: 88
End: 2023-03-07
Attending: NURSE PRACTITIONER
Payer: MEDICARE

## 2023-03-07 ENCOUNTER — APPOINTMENT (OUTPATIENT)
Dept: NON INVASIVE DIAGNOSTICS | Age: 88
End: 2023-03-07
Attending: NURSE PRACTITIONER
Payer: MEDICARE

## 2023-03-07 LAB
ALBUMIN SERPL-MCNC: 2.6 G/DL (ref 3.5–5)
ALBUMIN/GLOB SERPL: 0.7 (ref 1.1–2.2)
ALP SERPL-CCNC: 49 U/L (ref 45–117)
ALT SERPL-CCNC: 14 U/L (ref 12–78)
ANION GAP SERPL CALC-SCNC: 6 MMOL/L (ref 5–15)
AST SERPL-CCNC: 23 U/L (ref 15–37)
ATRIAL RATE: 93 BPM
BASOPHILS # BLD: 0.1 K/UL (ref 0–0.1)
BASOPHILS NFR BLD: 1 % (ref 0–1)
BILIRUB SERPL-MCNC: 0.6 MG/DL (ref 0.2–1)
BUN SERPL-MCNC: 32 MG/DL (ref 6–20)
BUN/CREAT SERPL: 22 (ref 12–20)
CALCIUM SERPL-MCNC: 8.8 MG/DL (ref 8.5–10.1)
CALCULATED R AXIS, ECG10: 59 DEGREES
CALCULATED T AXIS, ECG11: 30 DEGREES
CHLORIDE SERPL-SCNC: 102 MMOL/L (ref 97–108)
CO2 SERPL-SCNC: 26 MMOL/L (ref 21–32)
CREAT SERPL-MCNC: 1.46 MG/DL (ref 0.55–1.02)
DIAGNOSIS, 93000: NORMAL
DIFFERENTIAL METHOD BLD: ABNORMAL
ECHO AV AREA PEAK VELOCITY: 2.2 CM2
ECHO AV AREA/BSA PEAK VELOCITY: 1.3 CM2/M2
ECHO AV PEAK GRADIENT: 5 MMHG
ECHO AV PEAK VELOCITY: 1.1 M/S
ECHO AV VELOCITY RATIO: 0.91
ECHO EST RA PRESSURE: 3 MMHG
ECHO LV FRACTIONAL SHORTENING: 35 % (ref 28–44)
ECHO LV INTERNAL DIMENSION DIASTOLE INDEX: 2 CM/M2
ECHO LV INTERNAL DIMENSION DIASTOLIC: 3.4 CM (ref 3.9–5.3)
ECHO LV INTERNAL DIMENSION SYSTOLIC INDEX: 1.29 CM/M2
ECHO LV INTERNAL DIMENSION SYSTOLIC: 2.2 CM
ECHO LV IVSD: 1 CM (ref 0.6–0.9)
ECHO LV MASS 2D: 84.9 G (ref 67–162)
ECHO LV MASS INDEX 2D: 49.9 G/M2 (ref 43–95)
ECHO LV POSTERIOR WALL DIASTOLIC: 0.8 CM (ref 0.6–0.9)
ECHO LV RELATIVE WALL THICKNESS RATIO: 0.47
ECHO LVOT AREA: 2.5 CM2
ECHO LVOT DIAM: 1.8 CM
ECHO LVOT PEAK GRADIENT: 4 MMHG
ECHO LVOT PEAK VELOCITY: 1 M/S
ECHO RIGHT VENTRICULAR SYSTOLIC PRESSURE (RVSP): 43 MMHG
ECHO RV TAPSE: 1.3 CM (ref 1.7–?)
ECHO TV REGURGITANT MAX VELOCITY: 3.18 M/S
ECHO TV REGURGITANT PEAK GRADIENT: 40 MMHG
EOSINOPHIL # BLD: 1.4 K/UL (ref 0–0.4)
EOSINOPHIL NFR BLD: 12 % (ref 0–7)
ERYTHROCYTE [DISTWIDTH] IN BLOOD BY AUTOMATED COUNT: 12.6 % (ref 11.5–14.5)
GLOBULIN SER CALC-MCNC: 3.8 G/DL (ref 2–4)
GLUCOSE SERPL-MCNC: 102 MG/DL (ref 65–100)
HCT VFR BLD AUTO: 36.8 % (ref 35–47)
HGB BLD-MCNC: 12 G/DL (ref 11.5–16)
IMM GRANULOCYTES # BLD AUTO: 0.1 K/UL (ref 0–0.04)
IMM GRANULOCYTES NFR BLD AUTO: 1 % (ref 0–0.5)
LYMPHOCYTES # BLD: 2 K/UL (ref 0.8–3.5)
LYMPHOCYTES NFR BLD: 17 % (ref 12–49)
MCH RBC QN AUTO: 31.3 PG (ref 26–34)
MCHC RBC AUTO-ENTMCNC: 32.6 G/DL (ref 30–36.5)
MCV RBC AUTO: 96.1 FL (ref 80–99)
MONOCYTES # BLD: 1.4 K/UL (ref 0–1)
MONOCYTES NFR BLD: 12 % (ref 5–13)
NEUTS SEG # BLD: 6.8 K/UL (ref 1.8–8)
NEUTS SEG NFR BLD: 57 % (ref 32–75)
NRBC # BLD: 0 K/UL (ref 0–0.01)
NRBC BLD-RTO: 0 PER 100 WBC
PLATELET # BLD AUTO: 216 K/UL (ref 150–400)
PMV BLD AUTO: 10.6 FL (ref 8.9–12.9)
POTASSIUM SERPL-SCNC: 4.2 MMOL/L (ref 3.5–5.1)
PROT SERPL-MCNC: 6.4 G/DL (ref 6.4–8.2)
Q-T INTERVAL, ECG07: 366 MS
QRS DURATION, ECG06: 78 MS
QTC CALCULATION (BEZET), ECG08: 469 MS
RBC # BLD AUTO: 3.83 M/UL (ref 3.8–5.2)
RBC MORPH BLD: ABNORMAL
SODIUM SERPL-SCNC: 134 MMOL/L (ref 136–145)
VENTRICULAR RATE, ECG03: 99 BPM
WBC # BLD AUTO: 11.8 K/UL (ref 3.6–11)

## 2023-03-07 PROCEDURE — 97161 PT EVAL LOW COMPLEX 20 MIN: CPT

## 2023-03-07 PROCEDURE — 65660000001 HC RM ICU INTERMED STEPDOWN

## 2023-03-07 PROCEDURE — 71045 X-RAY EXAM CHEST 1 VIEW: CPT

## 2023-03-07 PROCEDURE — 74011250636 HC RX REV CODE- 250/636: Performed by: NURSE PRACTITIONER

## 2023-03-07 PROCEDURE — 97165 OT EVAL LOW COMPLEX 30 MIN: CPT

## 2023-03-07 PROCEDURE — 97530 THERAPEUTIC ACTIVITIES: CPT

## 2023-03-07 PROCEDURE — 36415 COLL VENOUS BLD VENIPUNCTURE: CPT

## 2023-03-07 PROCEDURE — 80053 COMPREHEN METABOLIC PANEL: CPT

## 2023-03-07 PROCEDURE — 74011250636 HC RX REV CODE- 250/636: Performed by: HOSPITALIST

## 2023-03-07 PROCEDURE — 93306 TTE W/DOPPLER COMPLETE: CPT

## 2023-03-07 PROCEDURE — 85025 COMPLETE CBC W/AUTO DIFF WBC: CPT

## 2023-03-07 PROCEDURE — 76937 US GUIDE VASCULAR ACCESS: CPT

## 2023-03-07 PROCEDURE — C1751 CATH, INF, PER/CENT/MIDLINE: HCPCS

## 2023-03-07 PROCEDURE — P9047 ALBUMIN (HUMAN), 25%, 50ML: HCPCS | Performed by: HOSPITALIST

## 2023-03-07 PROCEDURE — 77030020365 HC SOL INJ SOD CL 0.9% 50ML

## 2023-03-07 PROCEDURE — 74011250637 HC RX REV CODE- 250/637: Performed by: NURSE PRACTITIONER

## 2023-03-07 PROCEDURE — 74011000250 HC RX REV CODE- 250: Performed by: NURSE PRACTITIONER

## 2023-03-07 PROCEDURE — C1894 INTRO/SHEATH, NON-LASER: HCPCS

## 2023-03-07 RX ORDER — ALBUMIN HUMAN 250 G/1000ML
12.5 SOLUTION INTRAVENOUS EVERY 6 HOURS
Status: DISCONTINUED | OUTPATIENT
Start: 2023-03-07 | End: 2023-03-08

## 2023-03-07 RX ORDER — LEVOFLOXACIN 5 MG/ML
500 INJECTION, SOLUTION INTRAVENOUS
Status: DISCONTINUED | OUTPATIENT
Start: 2023-03-07 | End: 2023-03-10 | Stop reason: HOSPADM

## 2023-03-07 RX ORDER — UBIDECARENONE/VIT E ACET 100MG-5
100 CAPSULE ORAL
Status: COMPLETED | OUTPATIENT
Start: 2023-03-07 | End: 2023-03-09

## 2023-03-07 RX ADMIN — ALBUMIN (HUMAN) 12.5 G: 0.25 INJECTION, SOLUTION INTRAVENOUS at 10:56

## 2023-03-07 RX ADMIN — SPIRONOLACTONE 12.5 MG: 25 TABLET ORAL at 09:33

## 2023-03-07 RX ADMIN — SODIUM CHLORIDE, PRESERVATIVE FREE 10 ML: 5 INJECTION INTRAVENOUS at 17:34

## 2023-03-07 RX ADMIN — ALBUMIN (HUMAN) 12.5 G: 0.25 INJECTION, SOLUTION INTRAVENOUS at 18:00

## 2023-03-07 RX ADMIN — SODIUM CHLORIDE, PRESERVATIVE FREE 10 ML: 5 INJECTION INTRAVENOUS at 21:12

## 2023-03-07 RX ADMIN — SODIUM CHLORIDE, PRESERVATIVE FREE 10 ML: 5 INJECTION INTRAVENOUS at 05:11

## 2023-03-07 RX ADMIN — LEVOFLOXACIN 500 MG: 5 INJECTION, SOLUTION INTRAVENOUS at 20:14

## 2023-03-07 RX ADMIN — ENOXAPARIN SODIUM 30 MG: 100 INJECTION SUBCUTANEOUS at 09:33

## 2023-03-07 RX ADMIN — Medication 100 MG: at 21:12

## 2023-03-07 RX ADMIN — ALBUMIN (HUMAN) 12.5 G: 0.25 INJECTION, SOLUTION INTRAVENOUS at 23:02

## 2023-03-07 RX ADMIN — LOSARTAN POTASSIUM 50 MG: 50 TABLET, FILM COATED ORAL at 09:33

## 2023-03-07 RX ADMIN — ALBUMIN (HUMAN) 12.5 G: 0.25 INJECTION, SOLUTION INTRAVENOUS at 11:42

## 2023-03-07 RX ADMIN — CARVEDILOL 3.12 MG: 3.12 TABLET, FILM COATED ORAL at 09:33

## 2023-03-07 NOTE — PROGRESS NOTES
Problem: Mobility Impaired (Adult and Pediatric)  Goal: *Acute Goals and Plan of Care (Insert Text)  Description: FUNCTIONAL STATUS PRIOR TO ADMISSION: Patient was modified independent using a rollator for functional mobility. No recent falls. Lives in [address] [place] [address] alone and walks to dining room for meals. No home O2. HOME SUPPORT PRIOR TO ADMISSION: The patient lived in [address] [place] [address] but has supportive children and grandchildren in the area. Physical Therapy Goals  Initiated 3/7/2023  1. Patient will move from supine to sit and sit to supine , scoot up and down, and roll side to side in bed with modified independence within 7 day(s). 2.  Patient will transfer from bed to chair and chair to bed with modified independence using the least restrictive device within 7 day(s). 3.  Patient will perform sit to stand with modified independence within 7 day(s). 4.  Patient will ambulate with modified independence for 100 feet with the least restrictive device within 7 day(s). Outcome: Progressing Towards Goal   PHYSICAL THERAPY EVALUATION  Patient: Laura Cisneros (71 y.o. female)  Date: 3/7/2023  Primary Diagnosis: Sepsis with acute hypoxic respiratory failure without septic shock, due to unspecified organism ([facility] [address] [A41.9, R65.20, J96.01]  Acute kidney injury superimposed on chronic kidney disease ([address] [N17.9, N18.9]  Fever [R50.9]       Precautions:   Fall, Bed Alarm    ASSESSMENT  Based on the objective data described below, the patient presents with impaired balance, decreased endurance, generalized weakness, and decreased activity tolerance following admission for sepsis with respiratory failure. Received in bed, agreeable to therapy but needing encouragement to mobilize OOB. Overall min-mod Ax2 for OOB to chair via stand pivot with RW. Cues for hand placement and to maintain safety in setting of impulsivity. Once in the chair, patient hypotensive and BP could not recover with feet elevated.  BP continued to trend low when supine in bed-- RN then present and rapid response called. Left in care of rapid team. May need short stay at rehab prior to d/c home alone at [address] [place] [address]. Patient Vitals for the past 8 hrs:   BP   03/07/23 1200 --   03/07/23 1149 (!) 105/44   03/07/23 1125 (!) 82/38   03/07/23 1046 (!) 82/38   03/07/23 1045 (!) 83/36   03/07/23 1044 (!) 88/35   03/07/23 1043 (!) 78/39   03/07/23 1031 (!) 90/40   03/07/23 1019 --   03/07/23 0759 137/65           Current Level of Function Impacting Discharge (mobility/balance): min-mod Ax2    Functional Outcome Measure: The patient scored 11/24 on the [facility] outcome measure which is indicative of increased need for follow up therapy at d/c. Other factors to consider for discharge: lives alone in [address] [place] [address]     Patient will benefit from skilled therapy intervention to address the above noted impairments. PLAN :  Recommendations and Planned Interventions: bed mobility training, transfer training, gait training, therapeutic exercises, neuromuscular re-education, patient and family training/education, and therapeutic activities      Frequency/Duration: Patient will be followed by physical therapy:  5 times a week to address goals. Recommendation for discharge: (in order for the patient to meet his/her long term goals)  Therapy up to 5 days/week in SNF setting    This discharge recommendation:  Has been made in collaboration with the attending provider and/or case management    IF patient discharges home will need the following DME: to be determined (TBD)         SUBJECTIVE:   Patient stated I can't get up.     OBJECTIVE DATA SUMMARY:   HISTORY:    Past Medical History:   Diagnosis Date    A-fib ([address]     CHF (congestive heart failure) (HCC)     EF of 55-60%    Hypertension     TIA (transient ischemic attack)      Past Surgical History:   Procedure Laterality Date    HX CATARACT REMOVAL      HX TONSILLECTOMY         Personal factors and/or comorbidities impacting plan of care: [facility]    Home Situation  Home Environment: [address] Name: [facility]  One/Two Story Residence: One story  Living Alone: No  Support Systems: Assisted Living, Child(anabelle)  Patient Expects to be Discharged to[de-identified] Home with home health  Current DME Used/Available at Home: Quincy Cardoza    EXAMINATION/PRESENTATION/DECISION MAKING:   Critical Behavior:  Neurologic State: Alert, Confused  Orientation Level: Oriented to person, Oriented to place, Disoriented to time, Disoriented to situation  Cognition: Follows commands, Memory loss  Safety/Judgement: Fall prevention  Hearing: Auditory  Auditory Impairment: None  Range Of Motion:  AROM: Generally decreased, functional  Strength:    Strength: Generally decreased, functional  Tone & Sensation:   Tone: Normal  Coordination:  Coordination: Generally decreased, functional  Functional Mobility:  Bed Mobility:  Supine to Sit: Minimum assistance; Moderate assistance;Assist x2  Scooting: Moderate assistance  Transfers:  Sit to Stand: Minimum assistance  Stand to Sit: Minimum assistance  Stand Pivot Transfers: Minimum assistance     Bed to Chair: Minimum assistance;Assist x2  Balance:   Sitting: Impaired; Without support  Sitting - Static: Good (unsupported)  Sitting - Dynamic: Fair (occasional)  Standing: Impaired; With support  Standing - Static: Fair  Standing - Dynamic : Fair      Functional Measure:  [address] \[facility]\"" Box Pending sale to [facility] AM-PAC®      Basic Mobility Inpatient Short Form (6-Clicks) Version 2  How much HELP from another person do you currently need. .. (If the patient hasn't done an activity recently, how much help from another person do you think they would need if they tried?) Total A Lot A Little None   1. Turning from your back to your side while in a flat bed without using bedrails? []  1 [x]  2 []  3  []  4   2. Moving from lying on your back to sitting on the side of a flat bed without using bedrails? []  1 [x]  2 []  3  []  4   3.   Moving to and from a bed to a chair (including a wheelchair)? []  1 [x]  2 []  3  []  4   4. Standing up from a chair using your arms (e.g. wheelchair or bedside chair)? []  1 [x]  2 []  3  []  4   5. Walking in hospital room? []  1 [x]  2 []  3  []  4   6. Climbing 3-5 steps with a railing? [x]  1 []  2 []  3  []  4     Raw Score: 11/24                            Cutoff score ?171,2,3 had higher odds of discharging home with home health or need of SNF/IPR. [address], Rosibel Hernandez, Karrie May Stalins. Validity of the AM-PAC 6-Clicks Inpatient Daily Activity and Basic Mobility Short Forms. Physical Therapy Mar 2014, 94 (3) 379-391; DOI: 10.2522/ptj.47778872  2. [place] Crain. Association of AM-PAC \"6-Clicks\" Basic Mobility and Daily Activity Scores With Discharge Destination. Phys Ther. 2021 Apr 4;101(4):pkou226. doi: 10.1093/ptj/gdmh153. PMID: 01378221. V Juhi Rosas, Brandon D, Teja Evans, Kathy K, Hipolito S. Activity Measure for Post-Acute Care \"6-Clicks\" Basic Mobility Scores Predict Discharge Destination After Acute Care Hospitalization in Select Patient Groups: A Retrospective, Observational Study. Arch Rehabil Res Clin Transl. 2022 Jul 16;4(3):126294. doi: 10.1016/j.arrct. 7530.623932. PMID: 36750625; PMCID: SRD0348224. 4. Miah Finch Ni P. AM-PAC Short Forms Manual 4.0. Revised 2/2020.          Physical Therapy Evaluation Charge Determination   History Examination Presentation Decision-Making   HIGH Complexity :3+ comorbidities / personal factors will impact the outcome/ POC  HIGH Complexity : 4+ Standardized tests and measures addressing body structure, function, activity limitation and / or participation in recreation  LOW Complexity : Stable, uncomplicated  Other outcome measures [facility] 11/24  HIGH       Based on the above components, the patient evaluation is determined to be of the following complexity level: LOW Pain Rating:  Denied pain    Activity Tolerance:   Fair and signs and symptoms of orthostatic hypotension, alarm on    After treatment patient left in no apparent distress:   Supine in bed, Call bell within reach, Caregiver / family present, and Side rails x 3    COMMUNICATION/EDUCATION:   The patients plan of care was discussed with: Occupational therapist and Registered nurse. Fall prevention education was provided and the patient/caregiver indicated understanding., Patient/family have participated as able in goal setting and plan of care. , and Patient/family agree to work toward stated goals and plan of care.     Thank you for this referral.  Jayne Shannon, PT, DPT   Time Calculation: 36 mins

## 2023-03-07 NOTE — PROGRESS NOTES
Bedside and Verbal shift change report given to [address] [place] [address] (oncoming nurse) by Erika Hood RN (offgoing nurse). Report included the following information SBAR, Kardex, ED Summary, Intake/Output, MAR, and Cardiac Rhythm Afib .

## 2023-03-07 NOTE — PROGRESS NOTES
Pharmacy renal dose protocol: levofloxacin  Indication:  possible PNA  Current regimen:  750 mg IV q48h    Recent Labs     23  0447 23   WBC 11.8* 15.1*   CREA 1.46* 1.53*   BUN 32* 26*     Est CrCl: 18 ml/min  Temp (24hrs), Av.7 °F (36.5 °C), Min:97.4 °F (36.3 °C), Max:98.2 °F (36.8 °C)    Plan: Change to 500 mg IV q48h for CrCl less than 20 ml/min

## 2023-03-07 NOTE — PROGRESS NOTES
KELLY:  RUR: 14%    Disposition:  home at [facility] (with increased support services) vs SNF rehab. Chart reviewed. Patient admitted here 3/5/23 from [address] [facility]. Satish Blackekmsantos TORRES. This is an Assisted Living facility. The patient was admitted here on 3/5/23 with complaints of fever. The patient has been treated for a UTI and on adm was on day 6 of abx. The patient was noted to have wheezing and congested cough. TATA called [address] [employer] [employer] [address] [phone] and left message to have the Resident Care  Director John Macedo) return call. CM requesting confirmation of fax number for [facility]. TATA met with patient's daughter (Haily Schroeder) to obtain further background information for assessment. [place] gives the following information:     The patient's level of care ar [facility] is basic long term. The patient dresses and toilets herself. She has q 1.5 hr checks around the clock by staff. She uses a rollator to ambulate to lunch and dinner in the dining room. The patient has a late breakfast in her room. A med  tech delivers the patient's meals. The patient is known to [employer] PT and OT. Staff accompany patient to [employer]. Patient is given gentle reminders in navigating the facility due to her dementia. [place] was given a SNF list for choice. Selection includes [address]. 4:45p  TATA received return call from [facility].  She added that patient is checked on q 2 hrs. Patient is assisted with a shower 1x/week. CM faxed clinicals to [facility] at [phone]. CM continuing to follow.     Mago Ford, [address], [address]

## 2023-03-07 NOTE — PROGRESS NOTES
Problem: Pressure Injury - Risk of  Goal: *Prevention of pressure injury  Description: Document Reji Scale and appropriate interventions in the flowsheet. Outcome: Progressing Towards Goal  Note: Pressure Injury Interventions:       Moisture Interventions: Absorbent underpads, Internal/External urinary devices, Minimize layers    Activity Interventions: Increase time out of bed, Pressure redistribution bed/mattress(bed type), PT/OT evaluation    Mobility Interventions: HOB 30 degrees or less, Pressure redistribution bed/mattress (bed type), PT/OT evaluation    Nutrition Interventions: Document food/fluid/supplement intake    Friction and Shear Interventions: HOB 30 degrees or less, Minimize layers                Problem: Patient Education: Go to Patient Education Activity  Goal: Patient/Family Education  Outcome: Progressing Towards Goal     Problem: Falls - Risk of  Goal: *Absence of Falls  Description: Document Janet Fall Risk and appropriate interventions in the flowsheet.   Outcome: Progressing Towards Goal  Note: Fall Risk Interventions:                                Problem: Patient Education: Go to Patient Education Activity  Goal: Patient/Family Education  Outcome: Progressing Towards Goal

## 2023-03-07 NOTE — PROGRESS NOTES
Problem: Self Care Deficits Care Plan (Adult)  Goal: *Acute Goals and Plan of Care (Insert Text)  Description: FUNCTIONAL STATUS PRIOR TO ADMISSION: Patient was modified independent using a rollator for functional mobility. Patient required minimal assistance for basic and instrumental ADLs. Gets help with showers only, otherwise completes ADLs independently. HOME SUPPORT: Patient was staying at [facility] and was at [address] [place] [address] Road, requiring assist for showers and IADLs such as food prep and medication management. Occupational Therapy Goals  Initiated 3/7/2023  1. Patient will perform self-feeding with independence within 7 day(s). 2.  Patient will perform grooming in standing with modified independence within 7 day(s). 3.  Patient will perform lower body dressing with minimal assistance/contact guard assist within 7 day(s). 4.  Patient will perform toilet transfers with supervision/set-up within 7 day(s). 5.  Patient will perform all aspects of toileting with supervision/set-up within 7 day(s). 6.  Patient will participate in upper extremity therapeutic exercise/activities with independence for 10 minutes within 7 day(s). 7.  Patient will utilize energy conservation techniques during functional activities with verbal and visual cues within 7 day(s).   Outcome: Not Met   OCCUPATIONAL THERAPY EVALUATION  Patient: Nenita Gaines (27 y.o. female)  Date: 3/7/2023  Primary Diagnosis: Sepsis with acute hypoxic respiratory failure without septic shock, due to unspecified organism ([facility] [A41.9, R65.20, J96.01]  Acute kidney injury superimposed on chronic kidney disease ([facility] [N17.9, N18.9]  Fever [R50.9]       Precautions: Fall, Bed Alarm    ASSESSMENT  Based on the objective data described below, the patient presents with low BP with out of bed activity and difficulty recovering, impaired balance, limited lower extremity access, generalized decreased strength, confusion, and requiring increased assist for self care and functional mobility/transfers. Patient received semi supine in bed and agreeable to working with therapy, pleasantly confused throughout session. Patient transferred to sitting, no complaints of dizziness with mobility at that time so stood with RW and transferred into recliner. Patient taking several bites of food while sitting and BP assessed, noted to be low (90s/40s), nursing notified, patient legs recliner but BP then in 80s/30s and not recovering. Assisted patient to transfer back to bed and nursing notified of additional drop and difficulty recovering. Bed placed in trendelenburg but patient BP maintained in 80s/30s without recovery at this time. Nursing staff initiated a rapid response and patient left with staff in room to respond, new orders per MD. Patient was a poor historian of prior level of function and home environment information, frequently reporting \"I can't remember\". Ana entered midway through session and able to provide more detailed information. Patient was mod independent using a rollator, able to walk down to dining room during day and no falls in last several months. Overall patient presents well below reported baseline at this time, previously requiring assist for ADLs to shower only. Patient would benefit from skilled OT services during admission to improve independence with self care and functional mobility/transfers. Recommend discharge to SNF pending progress with therapy. Current Level of Function Impacting Discharge (ADLs/self-care): min to mod A x2 for mobility/transfers, setup feeding, up to max A lower extremity activities of daily living     Functional Outcome Measure: The patient scored 17/24 on the AM-PAC outcome measure which is indicative of increased likelihood of need for post-acute therapy.       Other factors to consider for discharge: prior level of function assist for showering, mod independent with rollator, family support     Patient will benefit from skilled therapy intervention to address the above noted impairments. PLAN :  Recommendations and Planned Interventions: self care training, functional mobility training, therapeutic exercise, balance training, therapeutic activities, endurance activities, patient education, home safety training, and family training/education    Frequency/Duration: Patient will be followed by occupational therapy 5 times a week to address goals. Recommendation for discharge: (in order for the patient to meet his/her long term goals)  Therapy up to 5 days/week in SNF setting    This discharge recommendation:  Has not yet been discussed the attending provider and/or case management    IF patient discharges home will need the following DME: patient owns DME required for discharge but would require increased physical assist for all self care and mobility       SUBJECTIVE:   Patient stated I can't believe I turn 89 soon, I never thought I'd live this long.     OBJECTIVE DATA SUMMARY:   HISTORY:   Past Medical History:   Diagnosis Date    A-fib ([facility]     CHF (congestive heart failure) ([facility])     EF of 55-60%    Hypertension     TIA (transient ischemic attack)      Past Surgical History:   Procedure Laterality Date    HX CATARACT REMOVAL      HX TONSILLECTOMY         Expanded or extensive additional review of patient history:     Home Situation  Home Environment: Assisted living  [address] [place] [address] Name: [facility]  One/Two Story Residence: One story  Living Alone: No  Support Systems: Assisted Living, Child(anabelle)  Patient Expects to be Discharged to[de-identified] Home with home health  Current DME Used/Available at Home: Walker    Hand dominance: Right    EXAMINATION OF PERFORMANCE DEFICITS:  Cognitive/Behavioral Status:  Neurologic State: Alert;Confused  Orientation Level: Oriented to person;Oriented to place; Disoriented to time;Disoriented to situation  Cognition: Follows commands;Memory loss        Safety/Judgement: Fall prevention    Skin: intact where visible    Edema: none noted    Hearing: Auditory  Auditory Impairment: None    Vision/Perceptual:                                     Range of Motion:  AROM: Generally decreased, functional                         Strength:  Strength: Generally decreased, functional                Coordination:  Coordination: Generally decreased, functional            Tone & Sensation:  Tone: Normal                         Balance:  Sitting: Impaired; Without support  Sitting - Static: Good (unsupported)  Sitting - Dynamic: Fair (occasional)  Standing: Impaired; With support  Standing - Static: Fair  Standing - Dynamic : Fair    Functional Mobility and Transfers for ADLs:  Bed Mobility:  Supine to Sit: Minimum assistance; Moderate assistance;Assist x2  Scooting: Moderate assistance    Transfers:  Sit to Stand: Minimum assistance  Stand to Sit: Minimum assistance  Bed to Chair: Minimum assistance;Assist x2  Toilet Transfer : Minimum assistance;Assist x2 (infer to [facility])  Assistive Device : Gait Belt;Walker, rolling    ADL Assessment:  Feeding: Setup    Oral Facial Hygiene/Grooming: Setup (infer seated in chair)    Bathing: Minimum assistance (infer from LE access)         Upper Body Dressing: Setup (infer)    Lower Body Dressing: Maximum assistance (infer from LE access)    Toileting: Minimum assistance (infer from balance)                  ADL Intervention and task modifications:  Feeding  Container Management: Minimum assistance  Food to Mouth: Set-up  Drink to Mouth: Set-up                                       Cognitive Retraining  Safety/Judgement: Fall prevention       Functional Measure:  [employer] AM-PAC®      Daily Activity Inpatient Short Form (6-Clicks) Version 2  How much HELP from another person do you currently need. .. (If the patient hasn't done an activity recently, how much help from another person do you think they would need if they tried?) Total A Lot A Little None   1. Putting on and taking off regular lower body clothing? []   1 [x]   2 []   3 []   4   2. Bathing (including washing, rinsing, drying)? []   1 []   2 [x]   3 []   4   3. Toileting, which includes using toilet, bedpan, or urinal? []   1 []   2 [x]   3 []   4   4. Putting on and taking off regular upper body clothing? []   1 []   2 [x]   3 []   4   5. Taking care of personal grooming such as brushing teeth? []   1 []   2 [x]   3 []   4   6. Eating meals? []   1 []   2 [x]   3 []   4     Raw Score: 17/24                            Cutoff score ?191,2,3 had higher odds of discharging home with home health or need of SNF/IPR    1. Karrie Valenzuela. Validity of the AM-PAC 6-Clicks Inpatient Daily Activity and Basic Mobility Short Forms. Physical Therapy Mar 2014, 94 3) 379-391; DOI: 10.2522/ptj.14950942  2. Belgica Crain. Association of AM-PAC \"6-Clicks\" Basic Mobility and Daily Activity Scores With Discharge Destination. Phys Ther. 2021 Apr 4;101(4):ftmj093. doi: 10.1093/ptj/qqsc752. PMID: 64205162. V Juhi Rosas, Brandon GORDILLO, Teja Evans, Kathy K, Hipolito S. Activity Measure for Post-Acute Care \"6-Clicks\" Basic Mobility Scores Predict Discharge Destination After Acute Care Hospitalization in Select Patient Groups: A Retrospective, Observational Study. Arch Rehabil Res Clin Transl. 2022 Jul 16;4(3):079928. doi: 10.1016/j.arrct. 3904.347097. PMID: 15378071; PMCID: RIQ5537532. 4. Miah Finch Ni P. AM-PAC Short Forms Manual 4.0. Revised 2/2020.       Occupational Therapy Evaluation Charge Determination   History Examination Decision-Making   LOW Complexity : Brief history review  LOW Complexity : 1-3 performance deficits relating to physical, cognitive , or psychosocial skils that result in activity limitations and / or participation restrictions  LOW Complexity : No comorbidities that affect functional and no verbal or physical assistance needed to complete eval tasks       Based on the above components, the patient evaluation is determined to be of the following complexity level: LOW   Pain Rating:  None reported, did report some light headedness when in trendelenburg position    Activity Tolerance:   Fair, requires rest breaks, and signs and symptoms of orthostatic hypotension    After treatment patient left in no apparent distress:    Supine in bed and nursing staff and RRT team in room, bed in trendelenburg    COMMUNICATION/EDUCATION:   The patients plan of care was discussed with: Physical therapist and Registered nurse. Home safety education was provided and the patient/caregiver indicated understanding., Patient/family have participated as able in goal setting and plan of care. , and Patient/family agree to work toward stated goals and plan of care. This patients plan of care is appropriate for delegation to [facility].     Thank you for this referral.  Farrah White, OTR/L  Time Calculation: 37 mins

## 2023-03-07 NOTE — PROGRESS NOTES
1045: PT & OT alerted this RN that pt's BP had gone down to 90s/40s when moving her to the chair from the bed. BP was not recovering after returning to bed and placing in trendelenberg. This RN assessed the pt and saw that repeated BP showed 80s/30s and a RRT was called. Dr. Kelly Talavera ordered stat albumin and scheduled future albumin to bring BP back up. Will continue to monitor.

## 2023-03-07 NOTE — PROGRESS NOTES
Hospitalist Progress Note  Caryle Lose, MD  Answering service: [phone] from in house phone        Date of Service:  3/7/2023  NAME:  Gloria Guy  :  1923  MRN:  [id number]      Admission Summary:   Gloria Guy is a 80 y.o. female who presents with cough and fever. She was febrile to 102.1. She has a history of atrial fibrillation, reported CHF, hypertension and dementia. Normally lives at [facility]. She was brought to freestanding emergency department where work-up included chest radiograph which showed interstitial edema, elevated BNP, white count of 15. She was given a dose of levofloxacin and referred to the hospitalist service at our facility for further management. At the moment of the initial interview she was oriented however very poor memory. She did have cough however states that she felt that her breathing was fine. She remains on 2 L of supplemental oxygen. She was afebrile at the moment of the encounter, speaking in complete sentences. Interval history / Subjective: Follow up SOB  RRT called for hypotension  No more documented fever     Assessment & Plan:     RRT called for hypotension  -hold coreg/arb/aldactone  -IV albumin  -Echo  -Monitor, may need to put the patient on midodrine    Sepsis ?viral etiology  Cough/fever  -procalcitonin low  -CXR suggests cardiomegaly, pleural effusion  -follow cultures  -On LVQ, continue for now     Probable Acute on chronic diastolic CHF, unclear NYHA class  -CXR as above  -pro BNP elevated  -IV lasix given 3/6  -Echo  -Will consult Cardiology depending on Echo     Acute hypoxic respiratory failure  Likely due to the above. Currently on 2 L/min of supplemental oxygen via nasal cannula.   Continuing the aforementioned treatments  Wean oxygen as tolerated     Mild hyponatremia: monitor  Hypertension: holding home meds for now  Paroxysmal A fib: hold coreg  Recent UTI: finished a 6 day course  CKD stage III: stable  Probable PCM: prealbumin, nutrition consult  Dementia: supportive care     Cardiac diet    Code status: FULL CODE  Prophylaxis: scd  PTA: [facility]    Plan: Echo, follow cardiology  Care Plan discussed with: patient, lonnie  Anticipated Disposition: in 1-2 days     Hospital Problems  Date Reviewed: 7/19/2020            Codes Class Noted POA    Fever ICD-10-CM: R50.9  ICD-9-CM: 780.60  3/5/2023 Unknown        Acute kidney injury superimposed on chronic kidney disease ([facility] ICD-10-CM: N17.9, N18.9  ICD-9-CM: 584.9, 585.9  3/5/2023 Unknown        Sepsis with acute hypoxic respiratory failure without septic shock, due to unspecified organism [facility]) ICD-10-CM: A41.9, R65.20, J96.01  ICD-9-CM: 038.9, 995.91, 518.81  3/5/2023 Unknown               Review of Systems:   A comprehensive review of systems was negative except for that written in the HPI. Vital Signs:    Last 24hrs VS reviewed since prior progress note.  Most recent are:  Visit Vitals  BP (!) 82/38   Pulse 89   Temp 97.7 °F (36.5 °C)   Resp 22   Ht 5' 5\" (1.651 m)   Wt 63.3 kg (139 lb 8.8 oz)   SpO2 96%   BMI 23.22 kg/m²         Intake/Output Summary (Last 24 hours) at 3/7/2023 1136  Last data filed at 3/7/2023 7002  Gross per 24 hour   Intake --   Output 1100 ml   Net -1100 ml        Physical Examination:     I had a face to face encounter with this patient and independently examined them on 3/7/2023 as outlined below:          General : alert x 3, awake, no acute distress,   HEENT: PEERL, EOMI, moist mucus membrane, TM clear  Neck: supple, no JVD, no meningeal signs  Chest: Clear to auscultation bilaterally   CVS: S1 S2 heard, Capillary refill less than 2 seconds  Abd: soft/ non tender, non distended, BS physiological,   Ext: no clubbing, no cyanosis, no edema, brisk 2+ DP pulses  Neuro/Psych: pleasant mood and affect, CN 2-12 grossly intact, sensory grossly within normal limit, Strength 5/5 in all extremities, DTR 1+ x 4  Skin: warm            Data Review:    Review and/or order of clinical lab test    I have independently reviewed and interpreted patient's lab and all other diagnostic data    Notes reviewed from all clinical/nonclinical/nursing services involved in patient's clinical care. Care coordination discussions were held with appropriate clinical/nonclinical/ nursing providers based on care coordination needs. Labs:     Recent Labs     03/07/23 0447 03/05/23 2027   WBC 11.8* 15.1*   HGB 12.0 14.0   HCT 36.8 42.7    271     Recent Labs     03/07/23 0447 03/05/23 2027   * 134*   K 4.2 4.3    96*   CO2 26 29   BUN 32* 26*   CREA 1.46* 1.53*   * 120*   CA 8.8 9.6     Recent Labs     03/07/23 0447 03/05/23 2027   ALT 14 20   AP 49 76   TBILI 0.6 0.5   TP 6.4 8.5*   ALB 2.6* 3.7   GLOB 3.8 4.8*     No results for input(s): INR, PTP, APTT, INREXT in the last 72 hours. No results for input(s): FE, TIBC, PSAT, FERR in the last 72 hours. No results found for: FOL, RBCF   No results for input(s): PH, PCO2, PO2 in the last 72 hours. No results for input(s): CPK, CKNDX, TROIQ in the last 72 hours.     No lab exists for component: CPKMB  No results found for: CHOL, CHOLX, CHLST, CHOLV, HDL, HDLP, LDL, LDLC, DLDLP, TGLX, TRIGL, TRIGP, CHHD, CHHDX  Lab Results   Component Value Date/Time    Glucose (POC) 137 (H) 07/21/2020 06:32 PM     Lab Results   Component Value Date/Time    Color YELLOW 03/05/2023 08:27 PM    Appearance CLEAR 03/05/2023 08:27 PM    Specific gravity 1.010 03/05/2023 08:27 PM    Specific gravity 1.014 05/08/2022 10:41 AM    pH (UA) 6.0 03/05/2023 08:27 PM    Protein Negative 03/05/2023 08:27 PM    Glucose Negative 03/05/2023 08:27 PM    Ketone TRACE (A) 03/05/2023 08:27 PM    Bilirubin Negative 03/05/2023 08:27 PM    Urobilinogen 0.2 03/05/2023 08:27 PM    Nitrites Negative 03/05/2023 08:27 PM    Leukocyte Esterase Negative 03/05/2023 08:27 PM    Epithelial cells FEW 03/05/2023 08:27 PM    Bacteria Negative 03/05/2023 08:27 PM    WBC 0-4 03/05/2023 08:27 PM    RBC 0-5 03/05/2023 08:27 PM         Medications Reviewed:     Current Facility-Administered Medications   Medication Dose Route Frequency    albumin human 25% (BUMINATE) solution 12.5 g  12.5 g IntraVENous Q6H    levoFLOXacin (LEVAQUIN) 750 mg in D5W IVPB  750 mg IntraVENous Q48H    [Held by provider] carvediloL (COREG) tablet 3.125 mg  3.125 mg Oral BID    [Held by provider] losartan (COZAAR) tablet 50 mg  50 mg Oral DAILY    sodium chloride (NS) flush 5-40 mL  5-40 mL IntraVENous Q8H    sodium chloride (NS) flush 5-40 mL  5-40 mL IntraVENous PRN    acetaminophen (TYLENOL) tablet 650 mg  650 mg Oral Q6H PRN    Or    acetaminophen (TYLENOL) suppository 650 mg  650 mg Rectal Q6H PRN    polyethylene glycol (MIRALAX) packet 17 g  17 g Oral DAILY PRN    ondansetron (ZOFRAN ODT) tablet 4 mg  4 mg Oral Q8H PRN    Or    ondansetron (ZOFRAN) injection 4 mg  4 mg IntraVENous Q6H PRN    enoxaparin (LOVENOX) injection 30 mg  30 mg SubCUTAneous Q24H    [Held by provider] spironolactone (ALDACTONE) tablet 12.5 mg  12.5 mg Oral DAILY    sodium chloride (NS) flush 5-10 mL  5-10 mL IntraVENous PRN     ______________________________________________________________________  EXPECTED LENGTH OF STAY: - - -  ACTUAL LENGTH OF STAY:          2                 Alexandria Banks MD

## 2023-03-07 NOTE — PROCEDURES
Midline Insertion and Progress Note    PRE-PROCEDURE VERIFICATION  Correct Procedure: yes  Correct Site:  yes  Temperature: Temp: 97.4 °F (36.3 °C), Temperature Source: Temp Source: Oral  Recent Labs     03/07/23  0447   BUN 32*   CREA 1.46*      WBC 11.8*     Allergies: Pcn [penicillins]    PROCEDURE DETAIL  A single lumen power injectable midline IV catheter was started for reliable vascular access. The following documentation is in addition to the Midline properties in the lines/airways flowsheet :  Xylocaine 1% used intradermally  Lot #: [id number]  Catheter Total Length: 10 (cm)  External Catheter Length: 1 (cm)  Circumference: 27 (cm)  Vein Selection for Midline: right brachial  Complication Related to Insertion: none    Line is okay to use. Report to [facility].     Sneha Hanson, BSN, RN, VA-BC   Vascular Access Team

## 2023-03-07 NOTE — PROGRESS NOTES
Rapid Response called overhead at this time, RRT responding. Rapid called for hypotension. Patient was sitting up in recliner and working with PT when she became hypotensive. Was able to get back into bed with no issues, but hypotension has sustained. Dr. Kelly Talavera already aware and orders received for albumin, see MAR for further medications administration. Echo already planned for today. RRT will continue to follow.

## 2023-03-07 NOTE — PROGRESS NOTES
Bedside and Verbal shift change report given to [facility] (oncoming nurse) by Anna DYKES (offgoing nurse). Report included the following information SBAR, Kardex, Intake/Output, MAR, Accordion, and Cardiac Rhythm Afib Controlled .

## 2023-03-07 NOTE — PROGRESS NOTES
Physician Progress Note      Joseline Doyle  CSN #:                  [id number]  :                       1923  ADMIT DATE:       3/5/2023 8:03 PM  [address] [place] DATE:  RESPONDING  PROVIDER #:        Patra Bosworth MD Karon Novel MD          QUERY TEXT:    Noted documentation of acute respiratory failure in [address] PNs. Pt with one episode of hypoxia at 88% and placed on 2L NC. Pt with tachypnea, but able to speak in complete sentences, and no documentation that pt is in resp distress. In order to support the diagnosis of acute respiratory failure, please include additional clinical indicators in your documentation. Or please document if the diagnosis of acute respiratory failure has been ruled out after further study. The medical record reflects the following:  Risk Factors: acute dCHF    Clinical Indicators:    RA 88%  2L NC % with RR 21-39    H&P- She was afebrile at the moment of the encounter, speaking in complete sentences. Chest: Clear to auscultation bilaterally    Treatment: O2    Acute Respiratory Failure Clinical Indicators per  MS-DRG Training Guide and Quick Reference Guide:  pO2 < 60 mmHg or SpO2 (pulse oximetry) < 91% breathing room air  pCO2 > 50 and pH < 7.35  P/F ratio (pO2 / FIO2) < 300  pO2 decrease or pCO2 increase by 10 mmHg from baseline (if known)  Supplemental oxygen of 40% or more  Presence of respiratory distress, tachypnea, dyspnea, shortness of breath, wheezing  Unable to speak in complete sentences  Use of accessory muscles to breathe  Extreme anxiety and feeling of impending doom  Tripod position  Confusion/altered mental status/obtunded    Thank you,  Oscar Batista RN, BSN, CRCR, CCDS, SMART  Clinical Documentation Improvement  Maranda Gordon. [email or web address]  [phone] or via Perfect Serve  Options provided:  -- Acute Respiratory Failure as evidenced by, Please document evidence.   -- Acute Respiratory Failure ruled out after study  -- Other - I will add my own diagnosis  -- Disagree - Not applicable / Not valid  -- Disagree - Clinically unable to determine / Unknown  -- Refer to Clinical Documentation Reviewer    PROVIDER RESPONSE TEXT:    This patient is in acute respiratory failure as evidenced by Pulse ox 88% on room air on 3/5 at 2015pm    Query created by: Any Walden on 3/7/2023 12:25 PM      Electronically signed by:  Vonzella Siemens MD Janell Poche MD 3/7/2023 4:15 PM

## 2023-03-08 LAB
ANION GAP SERPL CALC-SCNC: 5 MMOL/L (ref 5–15)
BASOPHILS # BLD: 0 K/UL (ref 0–0.1)
BASOPHILS NFR BLD: 0 % (ref 0–1)
BUN SERPL-MCNC: 43 MG/DL (ref 6–20)
BUN/CREAT SERPL: 31 (ref 12–20)
CALCIUM SERPL-MCNC: 8.6 MG/DL (ref 8.5–10.1)
CHLORIDE SERPL-SCNC: 104 MMOL/L (ref 97–108)
CO2 SERPL-SCNC: 28 MMOL/L (ref 21–32)
CREAT SERPL-MCNC: 1.39 MG/DL (ref 0.55–1.02)
DIFFERENTIAL METHOD BLD: ABNORMAL
EOSINOPHIL # BLD: 1.3 K/UL (ref 0–0.4)
EOSINOPHIL NFR BLD: 12 % (ref 0–7)
ERYTHROCYTE [DISTWIDTH] IN BLOOD BY AUTOMATED COUNT: 12.6 % (ref 11.5–14.5)
GLUCOSE SERPL-MCNC: 86 MG/DL (ref 65–100)
HCT VFR BLD AUTO: 29.7 % (ref 35–47)
HGB BLD-MCNC: 9.5 G/DL (ref 11.5–16)
IMM GRANULOCYTES # BLD AUTO: 0.1 K/UL (ref 0–0.04)
IMM GRANULOCYTES NFR BLD AUTO: 1 % (ref 0–0.5)
LYMPHOCYTES # BLD: 2.1 K/UL (ref 0.8–3.5)
LYMPHOCYTES NFR BLD: 20 % (ref 12–49)
MCH RBC QN AUTO: 31.1 PG (ref 26–34)
MCHC RBC AUTO-ENTMCNC: 32 G/DL (ref 30–36.5)
MCV RBC AUTO: 97.4 FL (ref 80–99)
MONOCYTES # BLD: 1.2 K/UL (ref 0–1)
MONOCYTES NFR BLD: 11 % (ref 5–13)
NEUTS SEG # BLD: 5.9 K/UL (ref 1.8–8)
NEUTS SEG NFR BLD: 56 % (ref 32–75)
NRBC # BLD: 0 K/UL (ref 0–0.01)
NRBC BLD-RTO: 0 PER 100 WBC
PLATELET # BLD AUTO: 197 K/UL (ref 150–400)
PMV BLD AUTO: 10.8 FL (ref 8.9–12.9)
POTASSIUM SERPL-SCNC: 4.1 MMOL/L (ref 3.5–5.1)
PREALB SERPL-MCNC: 9.9 MG/DL (ref 20–40)
RBC # BLD AUTO: 3.05 M/UL (ref 3.8–5.2)
RBC MORPH BLD: ABNORMAL
RBC MORPH BLD: ABNORMAL
SODIUM SERPL-SCNC: 137 MMOL/L (ref 136–145)
WBC # BLD AUTO: 10.6 K/UL (ref 3.6–11)

## 2023-03-08 PROCEDURE — 97530 THERAPEUTIC ACTIVITIES: CPT

## 2023-03-08 PROCEDURE — 74011000250 HC RX REV CODE- 250: Performed by: NURSE PRACTITIONER

## 2023-03-08 PROCEDURE — 74011250636 HC RX REV CODE- 250/636: Performed by: HOSPITALIST

## 2023-03-08 PROCEDURE — 74011250636 HC RX REV CODE- 250/636: Performed by: INTERNAL MEDICINE

## 2023-03-08 PROCEDURE — 80048 BASIC METABOLIC PNL TOTAL CA: CPT

## 2023-03-08 PROCEDURE — P9047 ALBUMIN (HUMAN), 25%, 50ML: HCPCS | Performed by: HOSPITALIST

## 2023-03-08 PROCEDURE — 85025 COMPLETE CBC W/AUTO DIFF WBC: CPT

## 2023-03-08 PROCEDURE — 36415 COLL VENOUS BLD VENIPUNCTURE: CPT

## 2023-03-08 PROCEDURE — 74011250637 HC RX REV CODE- 250/637: Performed by: HOSPITALIST

## 2023-03-08 PROCEDURE — 65660000001 HC RM ICU INTERMED STEPDOWN

## 2023-03-08 PROCEDURE — 74011250637 HC RX REV CODE- 250/637: Performed by: NURSE PRACTITIONER

## 2023-03-08 PROCEDURE — 84134 ASSAY OF PREALBUMIN: CPT

## 2023-03-08 RX ORDER — FUROSEMIDE 10 MG/ML
20 INJECTION INTRAMUSCULAR; INTRAVENOUS ONCE
Status: COMPLETED | OUTPATIENT
Start: 2023-03-08 | End: 2023-03-08

## 2023-03-08 RX ORDER — ALBUMIN HUMAN 250 G/1000ML
12.5 SOLUTION INTRAVENOUS EVERY 6 HOURS
Status: DISPENSED | OUTPATIENT
Start: 2023-03-08 | End: 2023-03-09

## 2023-03-08 RX ADMIN — SODIUM CHLORIDE, PRESERVATIVE FREE 10 ML: 5 INJECTION INTRAVENOUS at 21:02

## 2023-03-08 RX ADMIN — RIVAROXABAN 15 MG: 15 TABLET, FILM COATED ORAL at 08:16

## 2023-03-08 RX ADMIN — CARVEDILOL 3.12 MG: 3.12 TABLET, FILM COATED ORAL at 10:20

## 2023-03-08 RX ADMIN — ALBUMIN (HUMAN) 12.5 G: 0.25 INJECTION, SOLUTION INTRAVENOUS at 05:13

## 2023-03-08 RX ADMIN — SODIUM CHLORIDE, PRESERVATIVE FREE 10 ML: 5 INJECTION INTRAVENOUS at 05:13

## 2023-03-08 RX ADMIN — Medication 100 MG: at 22:00

## 2023-03-08 RX ADMIN — SPIRONOLACTONE 12.5 MG: 25 TABLET ORAL at 10:19

## 2023-03-08 RX ADMIN — ALBUMIN (HUMAN) 12.5 G: 0.25 INJECTION, SOLUTION INTRAVENOUS at 23:16

## 2023-03-08 RX ADMIN — SODIUM CHLORIDE, PRESERVATIVE FREE 10 ML: 5 INJECTION INTRAVENOUS at 17:28

## 2023-03-08 RX ADMIN — ALBUMIN (HUMAN) 12.5 G: 0.25 INJECTION, SOLUTION INTRAVENOUS at 17:24

## 2023-03-08 RX ADMIN — FUROSEMIDE 20 MG: 10 INJECTION, SOLUTION INTRAVENOUS at 17:27

## 2023-03-08 NOTE — PROGRESS NOTES
Problem: Pressure Injury - Risk of  Goal: *Prevention of pressure injury  Description: Document Reji Scale and appropriate interventions in the flowsheet. Outcome: Progressing Towards Goal  Note: Pressure Injury Interventions:       Moisture Interventions: Absorbent underpads    Activity Interventions: Increase time out of bed    Mobility Interventions: HOB 30 degrees or less    Nutrition Interventions: Document food/fluid/supplement intake    Friction and Shear Interventions: HOB 30 degrees or less                Problem: Falls - Risk of  Goal: *Absence of Falls  Description: Document Janet Fall Risk and appropriate interventions in the flowsheet.   Outcome: Progressing Towards Goal  Note: Fall Risk Interventions:

## 2023-03-08 NOTE — PROGRESS NOTES
Bedside shift change report given to Dorian Robin RN (oncoming nurse) by Tess Arroyo RN (offgoing nurse). Report included the following information SBAR, Kardex, and MAR.

## 2023-03-08 NOTE — PROGRESS NOTES
Problem: Mobility Impaired (Adult and Pediatric)  Goal: *Acute Goals and Plan of Care (Insert Text)  Description: FUNCTIONAL STATUS PRIOR TO ADMISSION: Patient was modified independent using a rollator for functional mobility. No recent falls. Lives in [address] [place] [address] alone and walks to dining room for meals. No home O2. HOME SUPPORT PRIOR TO ADMISSION: The patient lived in [address] [place] [address] but has supportive children and grandchildren in the area. Physical Therapy Goals  Initiated 3/7/2023  1. Patient will move from supine to sit and sit to supine , scoot up and down, and roll side to side in bed with modified independence within 7 day(s). 2.  Patient will transfer from bed to chair and chair to bed with modified independence using the least restrictive device within 7 day(s). 3.  Patient will perform sit to stand with modified independence within 7 day(s). 4.  Patient will ambulate with modified independence for 100 feet with the least restrictive device within 7 day(s). Outcome: Progressing Towards Goal   PHYSICAL THERAPY TREATMENT  Patient: Shemar Anderson (9 y.o. female)  Date: 3/8/2023  Diagnosis: Sepsis with acute hypoxic respiratory failure without septic shock, due to unspecified organism ([facility] [A41.9, R65.20, J96.01]  Acute kidney injury superimposed on chronic kidney disease ([address] [N17.9, N18.9]  Fever [R50.9] <principal problem not specified>      Precautions: Fall, Bed Alarm  Chart, physical therapy assessment, plan of care and goals were reviewed. ASSESSMENT  Patient continues with skilled PT services and is progressing towards goals. Pt received supine in bed and agreeable to therapy. Pt tolerated session well, but continues to be limited by confusion, decreased functional mobility, impaired balance and gait, decreased tolerance to activity. Pt's BP more stable during PT session this date (110/55 supine, 118/47 seated EOB). However, pt disinterested in sitting in the chair at this time.  Pt required min-mod A supine to sit EOB, sit<>stand with RW with min A. Pt tolerated side stepping along EOB with RW and assisted back to supine position. Continue to recommend SNF Placement upon discharge. Current Level of Function Impacting Discharge (mobility/balance): min-mod A supine to sit, min A sit<>Stand with RW and side stepping    Other factors to consider for discharge: previously mod I with rollator         PLAN :  Patient continues to benefit from skilled intervention to address the above impairments. Continue treatment per established plan of care. to address goals. Recommendation for discharge: (in order for the patient to meet his/her long term goals)  Therapy up to 5 days/week in SNF setting    This discharge recommendation:  Has been made in collaboration with the attending provider and/or case management    IF patient discharges home will need the following DME: to be determined (TBD)       SUBJECTIVE:   Patient stated I haven't done much today.     OBJECTIVE DATA SUMMARY:   Critical Behavior:  Neurologic State: Alert, Confused  Orientation Level: Oriented to person  Cognition: Follows commands, Memory loss, Poor safety awareness  Safety/Judgement: Fall prevention  Functional Mobility Training:  Bed Mobility:     Supine to Sit: Minimum assistance; Moderate assistance  Sit to Supine: Minimum assistance  Scooting: Minimum assistance        Transfers:  Sit to Stand: Minimum assistance  Stand to Sit: Minimum assistance                             Balance:  Sitting: Impaired; Without support  Sitting - Static: Good (unsupported)  Sitting - Dynamic: Fair (occasional)  Standing: Impaired; With support  Standing - Static: Fair  Standing - Dynamic : Fair  Ambulation/Gait Training:  Distance (ft): 3 Feet (ft)  Assistive Device: Gait belt;Walker, rolling  Ambulation - Level of Assistance: Minimal assistance;Assist x2        Gait Abnormalities: Decreased step clearance; Step to gait        Base of Support: Narrowed     Speed/Bethany: Pace decreased (<100 feet/min)  Step Length: Right shortened;Left shortened                    Stairs: Therapeutic Exercises:     Pain Rating:  Pt denied pain    Activity Tolerance:   Fair    After treatment patient left in no apparent distress:   Supine in bed, Call bell within reach, Bed / chair alarm activated, and Side rails x 3    COMMUNICATION/COLLABORATION:   The patients plan of care was discussed with: Occupational therapist and Registered nurse.      Elizabeth Cao, PT, DPT   Time Calculation: 13 mins

## 2023-03-08 NOTE — CONSULTS
CARDIOLOGY CONSULT                  Subjective:    Date of  Admission:   Admission type:Emergency    Giancarlo Lay MD     This is a 80 yof with MMP who presented to [facility] with fever and cough. She had an elevated BNP in the ED and was transferred here. She has been on supplemental O2 but has been breathing better. Patient Active Problem List   Diagnosis Code    CHF (congestive heart failure) ([facility]) I50.9    Acute respiratory failure ([facility]) J96.00    Fever R50.9    Acute kidney injury superimposed on chronic kidney disease ([facility]) N17.9, N18.9    Sepsis with acute hypoxic respiratory failure without septic shock, due to unspecified organism ([facility] A41.9, R65.20, J96.01        Past Medical History:   Diagnosis Date    A-fib ([facility]     CHF (congestive heart failure) ([facility])     EF of 55-60%    Hypertension     TIA (transient ischemic attack)       Past Surgical History:   Procedure Laterality Date    HX CATARACT REMOVAL      HX TONSILLECTOMY        No family history on file.    Social History     Socioeconomic History    Marital status:      Spouse name: Not on file    Number of children: Not on file    Years of education: Not on file    Highest education level: Not on file   Occupational History    Not on file   Tobacco Use    Smoking status: Former     Years: 30.00     Types: Cigarettes    Smokeless tobacco: Never   Substance and Sexual Activity    Alcohol use: Not Currently    Drug use: Not on file    Sexual activity: Not on file   Other Topics Concern    Not on file   Social History Narrative    Not on file     Social Determinants of Health     Financial Resource Strain: Not on file   Food Insecurity: Not on file   Transportation Needs: Not on file   Physical Activity: Not on file   Stress: Not on file   Social Connections: Not on file   Intimate Partner Violence: Not on file   Housing Stability: Not on file        Current Facility-Administered Medications   Medication Dose Route Frequency albumin human 25% (BUMINATE) solution 12.5 g  12.5 g IntraVENous Q6H    levoFLOXacin (LEVAQUIN) 500 mg in D5W IVPB  500 mg IntraVENous Q48H    resveratroL cap 100 mg (Patient Supplied)  100 mg Oral QHS    rivaroxaban (XARELTO) tablet 15 mg  15 mg Oral DAILY    sodium chloride (NS) flush 5-40 mL  5-40 mL IntraVENous Q8H    sodium chloride (NS) flush 5-40 mL  5-40 mL IntraVENous PRN    acetaminophen (TYLENOL) tablet 650 mg  650 mg Oral Q6H PRN    Or    acetaminophen (TYLENOL) suppository 650 mg  650 mg Rectal Q6H PRN    polyethylene glycol (MIRALAX) packet 17 g  17 g Oral DAILY PRN    ondansetron (ZOFRAN ODT) tablet 4 mg  4 mg Oral Q8H PRN    Or    ondansetron (ZOFRAN) injection 4 mg  4 mg IntraVENous Q6H PRN    spironolactone (ALDACTONE) tablet 12.5 mg  12.5 mg Oral DAILY    sodium chloride (NS) flush 5-10 mL  5-10 mL IntraVENous PRN             Review of Symptoms:    Gen - no F/C/S  Eyes - no vision changes  ENT - no sore throat, rhinorrhea, otalgia  CV - no CP, no palpitations, no orthopnea, no PND, no NARENDRA  Resp no cough, no SOB/COREY  GI - no AP, no n/v/d/c   - no dysuria, no hematuria  MSK - no abnormal joint pains  Skin - no rashes  Neuro - no HA, no numbness, no weakness, no slurred speech  Psych - no change in mood       Physical Exam    BP (!) 138/52 (BP 1 Location: Left upper arm, BP Patient Position: At rest)   Pulse 81   Temp 99.2 °F (37.3 °C)   Resp 25   Ht 5' 5\" (1.651 m)   Wt 63.3 kg (139 lb 8.8 oz)   SpO2 93%   BMI 23.22 kg/m²      NAD  Skin warm and dry  Nl conjunctiva  Oropharynx without exudate. Neck supple  Lungs decreased at bases  iIreg  Abdomen soft and non tender  Pulses 2+ radials  Neuro:  Grossly intact  Appropriate       Cardiographics    Telemetry: AFIB  ECG: AF  Echocardiogram: reviewed    Assessment: This is a 80 yof with multifactorial respiratory failure.     Cont gentle diuresis  Held BB due to hypotension  Echo with normal LVEF  Daily labs  Will consider SLGPT2  Cont other cardiac meds    Please call with questions    Plan:

## 2023-03-08 NOTE — PROGRESS NOTES
Bedside and Verbal shift change report given to Clint Thorntno (oncoming nurse) by Anna DYKES (offgoing nurse). Report included the following information SBAR, Intake/Output, MAR, Accordion, Recent Results, and Cardiac Rhythm Afib controlled .

## 2023-03-08 NOTE — PROGRESS NOTES
KELLY:  RUR: 14%     Disposition:  home at [facility] (with increased support services) vs SNF rehab. Chart reviewed. Referrals sent. Kristyn Alex responded favorably with approval.     CM called referring facility and  left a message for Phoenix Darnell (Director of Resident Care/[facility]) [phone]. CM awaiting response. Phoenix Darnell returns call stating facility is unable to accept patient back due to her increased medical needs. Facility is recommending SNF rehab. CM spoke with patient's daughter and provided updates. Daughter gives preference for 1-800-DENTIST. CM continuing to follow. Alexi Trimble, [address], [facility]  [phone]    11:05am  CM received call from Patient's daughter (Roxie Rei [phone]). Updates provided. CM notifying Kristyn Orellanatiffany [employer]) and initiating insurance auth.

## 2023-03-08 NOTE — PROGRESS NOTES
Hospitalist Progress Note  Isabel Sánchez MD  Answering service: [phone] from in house phone        Date of Service:  3/8/2023  NAME:  Carmenza Lilly  :  1923  MRN:  [id number]      Admission Summary:   Carmenza Lilly is a 80 y.o. female who presents with cough and fever. She was febrile to 102.1. She has a history of atrial fibrillation, reported CHF, hypertension and dementia. Normally lives at [facility]. She was brought to freestanding emergency department where work-up included chest radiograph which showed interstitial edema, elevated BNP, white count of 15. She was given a dose of levofloxacin and referred to the hospitalist service at our facility for further management. At the moment of the initial interview she was oriented however very poor memory. She did have cough however states that she felt that her breathing was fine. She remains on 2 L of supplemental oxygen. She was afebrile at the moment of the encounter, speaking in complete sentences. Interval history / Subjective:    Follow up SOB  RRT called 3/7 for hypotension  No more documented fever     Assessment & Plan:     RRT called for hypotension 3/7:   -responded with IV albumin and discontinues ARB  -hypotension again 3/8  -Echo unremarkable  -BB held  -consider holding aldactone  -Appreciate Cardiology    Sepsis ?viral etiology  Cough/fever  -procalcitonin low  -CXR suggests cardiomegaly, pleural effusion  -follow cultures, unremarkable  -On LVQ, continue for now     Probable Acute on chronic diastolic CHF, unclear NYHA class  The patient does not have acute CHF  -CXR as above  -pro BNP elevated  -IV lasix given 3/6  -Echo unremarkable  -Appreciate Cardiology, gentle diuresis     Acute hypoxic respiratory failure  -Wean off supp oxygen as able     Mild hyponatremia: monitor  Hypertension: holding home meds for now  Paroxysmal A fib: restart Coreg  Recent UTI: finished a 6 day course  CKD stage III: stable  Mild PCM: prealbumin, nutrition consult  Dementia: supportive care     Cardiac diet    Code status: FULL CODE  Prophylaxis: scd  PTA: [facility]    Plan: [facility] cardiology  Care Plan discussed with: patient, granddaughters  Anticipated Disposition: in 1-2 days     Hospital Problems  Date Reviewed: 7/19/2020            Codes Class Noted POA    Fever ICD-10-CM: R50.9  ICD-9-CM: 780.60  3/5/2023 Unknown        Acute kidney injury superimposed on chronic kidney disease ([facility] ICD-10-CM: N17.9, N18.9  ICD-9-CM: 584.9, 585.9  3/5/2023 Unknown        Sepsis with acute hypoxic respiratory failure without septic shock, due to unspecified organism [facility]) ICD-10-CM: A41.9, R65.20, J96.01  ICD-9-CM: 038.9, 995.91, 518.81  3/5/2023 Unknown             Review of Systems:   A comprehensive review of systems was negative except for that written in the HPI. Vital Signs:    Last 24hrs VS reviewed since prior progress note.  Most recent are:  Visit Vitals  BP (!) 161/91 (BP 1 Location: Left upper arm, BP Patient Position: Sitting)   Pulse 91   Temp 98.9 °F (37.2 °C)   Resp 28   Ht 5' 5\" (1.651 m)   Wt 63.3 kg (139 lb 8.8 oz)   SpO2 94%   BMI 23.22 kg/m²         Intake/Output Summary (Last 24 hours) at 3/8/2023 6658  Last data filed at 3/7/2023 1922  Gross per 24 hour   Intake --   Output 350 ml   Net -350 ml          Physical Examination:     I had a face to face encounter with this patient and independently examined them on 3/8/2023 as outlined below:          General : alert x 3, awake, no acute distress,   HEENT: PEERL, EOMI, moist mucus membrane, TM clear  Neck: supple, no JVD, no meningeal signs  Chest: Clear to auscultation bilaterally   CVS: S1 S2 heard, Capillary refill less than 2 seconds  Abd: soft/ non tender, non distended, BS physiological,   Ext: no clubbing, no cyanosis, no edema, brisk 2+ DP pulses  Neuro/Psych: pleasant mood and affect, CN 2-12 grossly intact, sensory grossly within normal limit, Strength 5/5 in all extremities, DTR 1+ x 4  Skin: warm            Data Review:    Review and/or order of clinical lab test    I have independently reviewed and interpreted patient's lab and all other diagnostic data    Notes reviewed from all clinical/nonclinical/nursing services involved in patient's clinical care. Care coordination discussions were held with appropriate clinical/nonclinical/ nursing providers based on care coordination needs. Labs:     Recent Labs     03/08/23 0011 03/07/23 0447   WBC 10.6 11.8*   HGB 9.5* 12.0   HCT 29.7* 36.8    216       Recent Labs     03/08/23 0011 03/07/23 0447 03/05/23 2027    134* 134*   K 4.1 4.2 4.3    102 96*   CO2 28 26 29   BUN 43* 32* 26*   CREA 1.39* 1.46* 1.53*   GLU 86 102* 120*   CA 8.6 8.8 9.6       Recent Labs     03/07/23 0447 03/05/23 2027   ALT 14 20   AP 49 76   TBILI 0.6 0.5   TP 6.4 8.5*   ALB 2.6* 3.7   GLOB 3.8 4.8*       No results for input(s): INR, PTP, APTT, INREXT, INREXT in the last 72 hours. No results for input(s): FE, TIBC, PSAT, FERR in the last 72 hours. No results found for: FOL, RBCF   No results for input(s): PH, PCO2, PO2 in the last 72 hours. No results for input(s): CPK, CKNDX, TROIQ in the last 72 hours.     No lab exists for component: CPKMB  No results found for: CHOL, CHOLX, CHLST, CHOLV, HDL, HDLP, LDL, LDLC, DLDLP, TGLX, TRIGL, TRIGP, CHHD, CHHDX  Lab Results   Component Value Date/Time    Glucose (POC) 137 (H) 07/21/2020 06:32 PM     Lab Results   Component Value Date/Time    Color YELLOW 03/05/2023 08:27 PM    Appearance CLEAR 03/05/2023 08:27 PM    Specific gravity 1.010 03/05/2023 08:27 PM    Specific gravity 1.014 05/08/2022 10:41 AM    pH (UA) 6.0 03/05/2023 08:27 PM    Protein Negative 03/05/2023 08:27 PM    Glucose Negative 03/05/2023 08:27 PM    Ketone TRACE (A) 03/05/2023 08:27 PM    Bilirubin Negative 03/05/2023 08:27 PM    Urobilinogen 0.2 03/05/2023 08:27 PM    Nitrites Negative 03/05/2023 08:27 PM    Leukocyte Esterase Negative 03/05/2023 08:27 PM    Epithelial cells FEW 03/05/2023 08:27 PM    Bacteria Negative 03/05/2023 08:27 PM    WBC 0-4 03/05/2023 08:27 PM    RBC 0-5 03/05/2023 08:27 PM         Medications Reviewed:     Current Facility-Administered Medications   Medication Dose Route Frequency    albumin human 25% (BUMINATE) solution 12.5 g  12.5 g IntraVENous Q6H    levoFLOXacin (LEVAQUIN) 500 mg in D5W IVPB  500 mg IntraVENous Q48H    resveratroL cap 100 mg (Patient Supplied)  100 mg Oral QHS    rivaroxaban (XARELTO) tablet 15 mg  15 mg Oral DAILY    [Held by provider] carvediloL (COREG) tablet 3.125 mg  3.125 mg Oral BID    sodium chloride (NS) flush 5-40 mL  5-40 mL IntraVENous Q8H    sodium chloride (NS) flush 5-40 mL  5-40 mL IntraVENous PRN    acetaminophen (TYLENOL) tablet 650 mg  650 mg Oral Q6H PRN    Or    acetaminophen (TYLENOL) suppository 650 mg  650 mg Rectal Q6H PRN    polyethylene glycol (MIRALAX) packet 17 g  17 g Oral DAILY PRN    ondansetron (ZOFRAN ODT) tablet 4 mg  4 mg Oral Q8H PRN    Or    ondansetron (ZOFRAN) injection 4 mg  4 mg IntraVENous Q6H PRN    [Held by provider] spironolactone (ALDACTONE) tablet 12.5 mg  12.5 mg Oral DAILY    sodium chloride (NS) flush 5-10 mL  5-10 mL IntraVENous PRN     ______________________________________________________________________  EXPECTED LENGTH OF STAY: 5d 0h  ACTUAL LENGTH OF STAY:          3                 Gale Trejo MD

## 2023-03-08 NOTE — PROGRESS NOTES
Problem: Self Care Deficits Care Plan (Adult)  Goal: *Acute Goals and Plan of Care (Insert Text)  Description: FUNCTIONAL STATUS PRIOR TO ADMISSION: Patient was modified independent using a rollator for functional mobility. Patient required minimal assistance for basic and instrumental ADLs. Gets help with showers only, otherwise completes ADLs independently. HOME SUPPORT: Patient was staying at [facility] and was at [address] [place] [address], requiring assist for showers and IADLs such as food prep and medication management. Occupational Therapy Goals  Initiated 3/7/2023  1. Patient will perform self-feeding with independence within 7 day(s). 2.  Patient will perform grooming in standing with modified independence within 7 day(s). 3.  Patient will perform lower body dressing with minimal assistance/contact guard assist within 7 day(s). 4.  Patient will perform toilet transfers with supervision/set-up within 7 day(s). 5.  Patient will perform all aspects of toileting with supervision/set-up within 7 day(s). 6.  Patient will participate in upper extremity therapeutic exercise/activities with independence for 10 minutes within 7 day(s). 7.  Patient will utilize energy conservation techniques during functional activities with verbal and visual cues within 7 day(s). Outcome: Progressing Towards Goal   OCCUPATIONAL THERAPY TREATMENT  Patient: Riky Tomlinson (89 y.o. female)  Date: 3/8/2023  Diagnosis: Sepsis with acute hypoxic respiratory failure without septic shock, due to unspecified organism ([facility] [A41.9, R65.20, J96.01]  Acute kidney injury superimposed on chronic kidney disease ([facility] [N17.9, N18.9]  Fever [R50.9] <principal problem not specified>      Precautions: Fall, Bed Alarm  Chart, occupational therapy assessment, plan of care, and goals were reviewed. ASSESSMENT  Patient continues with skilled OT services and is progressing slowly towards goals.  Patient presents this session with impaired activity tolerance, generalized deconditioning, impaired strength, pleasantly confused (dementia at baseline), and BP improved from day prior. Patient received semi supine in bed, transferred to sitting edge of bed and gown exchanged for clean gown. Patient stood and taking several side steps towards head of bed but then reporting fatigue and requesting to return to supine in bed. Overall patient did fair with session but remains below functional baseline at this time. Patient would benefit from skilled OT services during admission to improve independence with self care and functional mobility/transfers. Recommend discharge to SNF at this time. 03/08/23 1040 03/08/23 1043   Vital Signs   Pulse (Heart Rate) 90 99   Heart Rate Source Monitor Monitor   BP (!) 110/55 (!) 118/47   MAP (Calculated) 73 71   BP 1 Location Left upper arm Left upper arm   BP 1 Method Automatic Automatic   BP Patient Position At rest;Semi fowlers Sitting   O2 Sat (%) 96 % 95 %   O2 Device Nasal cannula Nasal cannula   O2 Flow Rate (L/min) 2 l/min 2 l/min     Current Level of Function Impacting Discharge (ADLs): min A for upper extremity dressing and mobility/transfers    Other factors to consider for discharge: low BP, family support, from [facility]         PLAN :  Patient continues to benefit from skilled intervention to address the above impairments. Continue treatment per established plan of care to address goals.     Recommend with staff: bed in modified chair position for meals, bedpan for [facility] for toileting pending BP tolerance to out of bed activity    Recommend next OT session: continue POC, BSC transfer if medically appropriate    Recommendation for discharge: (in order for the patient to meet his/her long term goals)  Therapy up to 5 days/week in SNF setting    This discharge recommendation:  Has been made in collaboration with the attending provider and/or case management    IF patient discharges home will need the following DME: TBD pending progress       SUBJECTIVE:   Patient stated Gopal Alexander would love to lay back down.     OBJECTIVE DATA SUMMARY:   Cognitive/Behavioral Status:  Neurologic State: Alert;Confused  Orientation Level: Oriented to person;Oriented to place; Disoriented to time;Disoriented to situation  Cognition: Follows commands             Functional Mobility and Transfers for ADLs:  Bed Mobility:  Supine to Sit: Minimum assistance; Moderate assistance  Sit to Supine: Minimum assistance  Scooting: Minimum assistance    Transfers:  Sit to Stand: Minimum assistance          Balance:  Sitting: Impaired; Without support  Sitting - Static: Good (unsupported)  Sitting - Dynamic: Fair (occasional)  Standing: Impaired; With support  Standing - Static: Fair  Standing - Dynamic : Fair    ADL Intervention:       Grooming  Grooming Assistance:  (offered but pt declined at this time)         Type of Bath: Chlorhexidine (CHG)         Upper Body [address]: Minimum  assistance                   Pain:  None reported    Activity Tolerance:   Fair, requires rest breaks, and signs and symptoms of orthostatic hypotension    After treatment patient left in no apparent distress:   Supine in bed, Heels elevated for pressure relief, Call bell within reach, Bed / chair alarm activated, and Side rails x 3    COMMUNICATION/COLLABORATION:   The patients plan of care was discussed with: Physical therapist and Registered nurse.      JOVANNI Billingsley/L  Time Calculation: 12 mins

## 2023-03-09 LAB
ANION GAP SERPL CALC-SCNC: 9 MMOL/L (ref 5–15)
BASOPHILS # BLD: 0.1 K/UL (ref 0–0.1)
BASOPHILS NFR BLD: 1 % (ref 0–1)
BUN SERPL-MCNC: 39 MG/DL (ref 6–20)
BUN/CREAT SERPL: 29 (ref 12–20)
CALCIUM SERPL-MCNC: 9.6 MG/DL (ref 8.5–10.1)
CHLORIDE SERPL-SCNC: 105 MMOL/L (ref 97–108)
CO2 SERPL-SCNC: 20 MMOL/L (ref 21–32)
CREAT SERPL-MCNC: 1.33 MG/DL (ref 0.55–1.02)
DIFFERENTIAL METHOD BLD: ABNORMAL
EOSINOPHIL # BLD: 1.3 K/UL (ref 0–0.4)
EOSINOPHIL NFR BLD: 9 % (ref 0–7)
ERYTHROCYTE [DISTWIDTH] IN BLOOD BY AUTOMATED COUNT: 12.5 % (ref 11.5–14.5)
GLUCOSE BLD STRIP.AUTO-MCNC: 105 MG/DL (ref 65–117)
GLUCOSE SERPL-MCNC: 111 MG/DL (ref 65–100)
HCT VFR BLD AUTO: 40.4 % (ref 35–47)
HGB BLD-MCNC: 12.8 G/DL (ref 11.5–16)
IMM GRANULOCYTES # BLD AUTO: 0.1 K/UL (ref 0–0.04)
IMM GRANULOCYTES NFR BLD AUTO: 1 % (ref 0–0.5)
LYMPHOCYTES # BLD: 2.8 K/UL (ref 0.8–3.5)
LYMPHOCYTES NFR BLD: 20 % (ref 12–49)
MCH RBC QN AUTO: 31.7 PG (ref 26–34)
MCHC RBC AUTO-ENTMCNC: 31.7 G/DL (ref 30–36.5)
MCV RBC AUTO: 100 FL (ref 80–99)
MONOCYTES # BLD: 1.6 K/UL (ref 0–1)
MONOCYTES NFR BLD: 11 % (ref 5–13)
NEUTS SEG # BLD: 8.2 K/UL (ref 1.8–8)
NEUTS SEG NFR BLD: 58 % (ref 32–75)
NRBC # BLD: 0 K/UL (ref 0–0.01)
NRBC BLD-RTO: 0 PER 100 WBC
PLATELET # BLD AUTO: 251 K/UL (ref 150–400)
PMV BLD AUTO: 10.7 FL (ref 8.9–12.9)
POTASSIUM SERPL-SCNC: 4.5 MMOL/L (ref 3.5–5.1)
RBC # BLD AUTO: 4.04 M/UL (ref 3.8–5.2)
RBC MORPH BLD: ABNORMAL
SERVICE CMNT-IMP: NORMAL
SODIUM SERPL-SCNC: 134 MMOL/L (ref 136–145)
WBC # BLD AUTO: 14.1 K/UL (ref 3.6–11)

## 2023-03-09 PROCEDURE — 74011250636 HC RX REV CODE- 250/636: Performed by: HOSPITALIST

## 2023-03-09 PROCEDURE — 74011250637 HC RX REV CODE- 250/637: Performed by: INTERNAL MEDICINE

## 2023-03-09 PROCEDURE — 97535 SELF CARE MNGMENT TRAINING: CPT

## 2023-03-09 PROCEDURE — 74011250636 HC RX REV CODE- 250/636: Performed by: INTERNAL MEDICINE

## 2023-03-09 PROCEDURE — P9047 ALBUMIN (HUMAN), 25%, 50ML: HCPCS | Performed by: HOSPITALIST

## 2023-03-09 PROCEDURE — 85025 COMPLETE CBC W/AUTO DIFF WBC: CPT

## 2023-03-09 PROCEDURE — 80048 BASIC METABOLIC PNL TOTAL CA: CPT

## 2023-03-09 PROCEDURE — 97530 THERAPEUTIC ACTIVITIES: CPT

## 2023-03-09 PROCEDURE — 82962 GLUCOSE BLOOD TEST: CPT

## 2023-03-09 PROCEDURE — 74011250637 HC RX REV CODE- 250/637: Performed by: NURSE PRACTITIONER

## 2023-03-09 PROCEDURE — 74011250637 HC RX REV CODE- 250/637: Performed by: HOSPITALIST

## 2023-03-09 PROCEDURE — 36415 COLL VENOUS BLD VENIPUNCTURE: CPT

## 2023-03-09 PROCEDURE — 65660000001 HC RM ICU INTERMED STEPDOWN

## 2023-03-09 PROCEDURE — 74011000250 HC RX REV CODE- 250: Performed by: NURSE PRACTITIONER

## 2023-03-09 RX ORDER — FUROSEMIDE 10 MG/ML
20 INJECTION INTRAMUSCULAR; INTRAVENOUS ONCE
Status: COMPLETED | OUTPATIENT
Start: 2023-03-09 | End: 2023-03-09

## 2023-03-09 RX ORDER — METOPROLOL SUCCINATE 25 MG/1
12.5 TABLET, EXTENDED RELEASE ORAL DAILY
Status: DISCONTINUED | OUTPATIENT
Start: 2023-03-09 | End: 2023-03-10 | Stop reason: HOSPADM

## 2023-03-09 RX ADMIN — Medication 100 MG: at 21:50

## 2023-03-09 RX ADMIN — RIVAROXABAN 15 MG: 15 TABLET, FILM COATED ORAL at 08:39

## 2023-03-09 RX ADMIN — FUROSEMIDE 20 MG: 10 INJECTION, SOLUTION INTRAVENOUS at 15:11

## 2023-03-09 RX ADMIN — SODIUM CHLORIDE, PRESERVATIVE FREE 10 ML: 5 INJECTION INTRAVENOUS at 21:50

## 2023-03-09 RX ADMIN — SPIRONOLACTONE 12.5 MG: 25 TABLET ORAL at 08:39

## 2023-03-09 RX ADMIN — SODIUM CHLORIDE, PRESERVATIVE FREE 10 ML: 5 INJECTION INTRAVENOUS at 05:27

## 2023-03-09 RX ADMIN — METOPROLOL SUCCINATE 12.5 MG: 25 TABLET, EXTENDED RELEASE ORAL at 15:16

## 2023-03-09 RX ADMIN — LEVOFLOXACIN 500 MG: 5 INJECTION, SOLUTION INTRAVENOUS at 20:09

## 2023-03-09 RX ADMIN — ALBUMIN (HUMAN) 12.5 G: 0.25 INJECTION, SOLUTION INTRAVENOUS at 05:27

## 2023-03-09 NOTE — PROGRESS NOTES
Cardiology Progress Note  3/9/2023     Admit Date: 3/5/2023  Admit Diagnosis: Sepsis with acute hypoxic respiratory failure without septic shock, due to unspecified organism ([address] [A41.9, R65.20, J96.01]  Acute kidney injury superimposed on chronic kidney disease ([address] [N17.9, N18.9]  Fever [R50.9]  CC: none currently    Assessment:   Active Problems:    Fever (3/5/2023)      Acute kidney injury superimposed on chronic kidney disease ([address] (3/5/2023)      Sepsis with acute hypoxic respiratory failure without septic shock, due to unspecified organism ([address] (3/5/2023)      Plan:     Redosed low dose furosemide  Echo unremarkable  Restarted low dose BB  Cont other cardiac meds    OK with DC from cardiac perspective  Subjective:      Haja Gillis has no cardiac c/o      Objective:    Physical Exam:  Overall VSSAF;  Visit Vitals  BP (!) 138/57 (BP 1 Location: Right upper arm, BP Patient Position: At rest)   Pulse (!) 103   Temp 98.4 °F (36.9 °C)   Resp 30   Ht 5' 5\" (1.651 m)   Wt 63.3 kg (139 lb 8.8 oz)   SpO2 94%   BMI 23.22 kg/m²     Temp (24hrs), Av °F (37.2 °C), Min:98.4 °F (36.9 °C), Max:99.6 °F (37.6 °C)    Patient Vitals for the past 8 hrs:   Pulse   23 1400 (!) 103   23 1202 94   23 1200 100   23 1143 100   23 1140 (!) 110   23 1135 (!) 109   23 1132 99   23 1026 92   23 1000 92   23 0759 99    Patient Vitals for the past 8 hrs:   Resp   23 1202 30   23 0759 27    Patient Vitals for the past 8 hrs:   BP   23 1202 (!) 138/57   23 1143 (!) 147/81   23 1140 (!) 144/55   23 1135 (!) 138/51   23 1132 (!) 141/52   23 1026 (!) 120/49   23 0759 (!) 162/76       1901 -  0700  In: -   Out: 1300 [Urine:1300]      General Appearance: Well developed, well nourished, no acute distress. Ears/Nose/Mouth/Throat:   Normal MM; anicteric. JVP: WNL   Resp:   Lungs clear to auscultation bilaterally. Nl resp effort. Cardiovascular:  irreg   Abdomen:   Soft, non-tender, bowel sounds are present. Extremities: No edema bilaterally. Skin:  Neuro: Warm and dry. A/O x3, grossly nonfocal                         Data Review:     Telemetry independently reviewed :   AF       AF  Labs:   Recent Results (from the past 24 hour(s))   CBC WITH AUTOMATED DIFF    Collection Time: 03/09/23  4:25 AM   Result Value Ref Range    WBC 14.1 (H) 3.6 - 11.0 K/uL    RBC 4.04 3.80 - 5.20 M/uL    HGB 12.8 11.5 - 16.0 g/dL    HCT 40.4 35.0 - 47.0 %    .0 (H) 80.0 - 99.0 FL    MCH 31.7 26.0 - 34.0 PG    MCHC 31.7 30.0 - 36.5 g/dL    RDW 12.5 11.5 - 14.5 %    PLATELET 268 215 - 242 K/uL    MPV 10.7 8.9 - 12.9 FL    NRBC 0.0 0  WBC    ABSOLUTE NRBC 0.00 0.00 - 0.01 K/uL    NEUTROPHILS 58 32 - 75 %    LYMPHOCYTES 20 12 - 49 %    MONOCYTES 11 5 - 13 %    EOSINOPHILS 9 (H) 0 - 7 %    BASOPHILS 1 0 - 1 %    IMMATURE GRANULOCYTES 1 (H) 0.0 - 0.5 %    ABS. NEUTROPHILS 8.2 (H) 1.8 - 8.0 K/UL    ABS. LYMPHOCYTES 2.8 0.8 - 3.5 K/UL    ABS. MONOCYTES 1.6 (H) 0.0 - 1.0 K/UL    ABS. EOSINOPHILS 1.3 (H) 0.0 - 0.4 K/UL    ABS. BASOPHILS 0.1 0.0 - 0.1 K/UL    ABS. IMM.  GRANS. 0.1 (H) 0.00 - 0.04 K/UL    DF SMEAR SCANNED      RBC COMMENTS MACROCYTOSIS  1+       METABOLIC PANEL, BASIC    Collection Time: 03/09/23  4:25 AM   Result Value Ref Range    Sodium 134 (L) 136 - 145 mmol/L    Potassium 4.5 3.5 - 5.1 mmol/L    Chloride 105 97 - 108 mmol/L    CO2 20 (L) 21 - 32 mmol/L    Anion gap 9 5 - 15 mmol/L    Glucose 111 (H) 65 - 100 mg/dL    BUN 39 (H) 6 - 20 MG/DL    Creatinine 1.33 (H) 0.55 - 1.02 MG/DL    BUN/Creatinine ratio 29 (H) 12 - 20      eGFR 36 (L) >60 ml/min/1.73m2    Calcium 9.6 8.5 - 10.1 MG/DL   GLUCOSE, POC    Collection Time: 03/09/23 12:04 PM   Result Value Ref Range    Glucose (POC) 105 65 - 117 mg/dL    Performed by Leonel Majano (CON)       Current medications reviewed       Walker Holt MD

## 2023-03-09 NOTE — PROGRESS NOTES
KELLY:  RUR: 14%  LOW    Disposition:  SNF    Referrals sent to area snfs. Kristyn Orellanamarcellecris accepted. Tamarisstajosiah denied. [facility]:  CM awaiting response. Daughter informed of acceptance with Althea Griffiths on yesterday. Daughter gives preference for [facility]. [facility] [place]:  CM called facility this morning. Staff reviewing. Facility is out of network with [facility] therefore patient (if accepted) will have a higher out of pocket cost. Facility will return call to this CM. CM continuing to follow. Clayton Velasquez, [address], [facility]  [phone]    3:36p  CM called [employer] [phone] to initiate ins auth. Ref # [id number]. Clinicals faxed to [facility] at [phone]. CM called Maria Elena at [facility]. Message left on  to have call returned.

## 2023-03-09 NOTE — PROGRESS NOTES
Problem: Mobility Impaired (Adult and Pediatric)  Goal: *Acute Goals and Plan of Care (Insert Text)  Description: FUNCTIONAL STATUS PRIOR TO ADMISSION: Patient was modified independent using a rollator for functional mobility. No recent falls. Lives in [address] [place] [address] alone and walks to dining room for meals. No home O2. HOME SUPPORT PRIOR TO ADMISSION: The patient lived in [address] [place] [address] but has supportive children and grandchildren in the area. Physical Therapy Goals  Initiated 3/7/2023  1. Patient will move from supine to sit and sit to supine , scoot up and down, and roll side to side in bed with modified independence within 7 day(s). 2.  Patient will transfer from bed to chair and chair to bed with modified independence using the least restrictive device within 7 day(s). 3.  Patient will perform sit to stand with modified independence within 7 day(s). 4.  Patient will ambulate with modified independence for 100 feet with the least restrictive device within 7 day(s). Outcome: Progressing Towards Goal   PHYSICAL THERAPY TREATMENT  Patient: Martine Posada (89 y.o. female)  Date: 3/9/2023  Diagnosis: Sepsis with acute hypoxic respiratory failure without septic shock, due to unspecified organism ([facility] [A41.9, R65.20, J96.01]  Acute kidney injury superimposed on chronic kidney disease ([address] [N17.9, N18.9]  Fever [R50.9] <principal problem not specified>      Precautions: Fall, Bed Alarm  Chart, physical therapy assessment, plan of care and goals were reviewed. ASSESSMENT  Patient continues with skilled PT services and is progressing towards goals. Pt received supine in bed and agreeable to therapy. Pt tolerated session well. Pt remains pleasantly confused, generalized weakness, decreased functional mobility, impaired balance and gait, increased risk for falls. Pt's BP remained stable during positional changes during today's session.  Pt was able to advance OOB to the chair with min A x 2 and HHA demonstrating short, shuffle steps and forward flexed trunk. Pt will continue to benefit from PT to progress mobility as tolerated. Continue to recommend SNF placement upon discharge to continue therapy efforts    Vitals:    03/09/23 1135 03/09/23 1140 03/09/23 1143 03/09/23 1202   BP: (!) 138/51 (!) 144/55 (!) 147/81 (!) 138/57   BP 1 Location: Right upper arm Right upper arm Right upper arm Right upper arm   BP Patient Position: Sitting Sitting  Comment: after transfer to recliner Sitting At rest   Pulse: (!) 109 (!) 110 100 94   Temp:    98.4 °F (36.9 °C)   Resp:    30   Height:       Weight:       SpO2:    94%     . Current Level of Function Impacting Discharge (mobility/balance): mod A x 2 supine to sit, min A sit<>stand, min A x 2 bed to chair    Other factors to consider for discharge:          PLAN :  Patient continues to benefit from skilled intervention to address the above impairments. Continue treatment per established plan of care. to address goals. 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     OBJECTIVE DATA SUMMARY:   Critical Behavior:  Neurologic State: Alert, Confused  Orientation Level: Oriented to person  Cognition: Follows commands, Memory loss  Safety/Judgement: Fall prevention  Functional Mobility Training:  Bed Mobility:     Supine to Sit: Moderate assistance;Assist x2     Scooting: Minimum assistance        Transfers:  Sit to Stand: Minimum assistance  Stand to Sit: Minimum assistance        Bed to Chair: Minimum assistance;Assist x2                    Balance:  Sitting: Impaired; Without support  Sitting - Static: Good (unsupported)  Sitting - Dynamic: Fair (occasional)  Standing: Impaired; With support  Standing - Static: Fair  Standing - Dynamic : Fair  Ambulation/Gait Training:  Distance (ft): 2 Feet (ft)  Assistive Device: Gait belt (bilateral HHA)  Ambulation - Level of Assistance: Minimal assistance;Assist x2        Gait Abnormalities: Decreased step clearance        Base of Support: Narrowed     Speed/Bethany: Pace decreased (<100 feet/min)  Step Length: Right shortened;Left shortened                    Stairs: Therapeutic Exercises:     Pain Rating:  Pt denied pain    Activity Tolerance:   Good and Fair    After treatment patient left in no apparent distress:   Sitting in chair, Call bell within reach, Bed / chair alarm activated, and Side rails x 3    COMMUNICATION/COLLABORATION:   The patients plan of care was discussed with: Occupational therapist and Registered nurse.      Maritza Finn PT, DPT   Time Calculation: 15 mins

## 2023-03-09 NOTE — PROGRESS NOTES
Bedside and Verbal shift change report given to [facility] RN (oncoming nurse) by Tamara Driscoll (offgoing nurse). Report included the following information SBAR, Kardex, MAR, and Cardiac Rhythm Afib .

## 2023-03-09 NOTE — PROGRESS NOTES
[address] [facility] Adult  Hospitalist Group                       Hospitalist Progress Note          Eloy Glass MD  Please call  and page for questions. Call physician on-call through the  7pm-7am        Daily Progress Note: 3/9/2023    Primary care provider:Yesenia Dobson MD    Date of admission: 3/5/2023  8:03 PM    Admission summery and hospital course:  Theresa Chua is a 80 y.o. female who presents with cough and fever. She was febrile to 102.1. She has a history of atrial fibrillation, reported CHF, hypertension and dementia. Normally lives at [facility]. She was brought to freestanding emergency department where work-up included chest radiograph which showed interstitial edema, elevated BNP, white count of 15. She was given a dose of levofloxacin and referred to the hospitalist service at our facility for further management. At the moment of the initial interview she was oriented however very poor memory. She did have cough however states that she felt that her breathing was fine. She remains on 2 L of supplemental oxygen. She was afebrile at the moment of the encounter, speaking in complete sentences. Subjective:   Patient said she is feeling better today. Patient denied any acute issue today. Assessment/Plan:     RRT called for hypotension 3/7:   Eesponded with IV albumin and discontinues ARB, hold BB.   -Hold albumin now. -hypotension again 3/8  -Echo unremarkable  -consider holding aldactone  -Appreciate Cardiology     Sepsis ?viral etiology  Cough/fever  -procalcitonin low  -CXR suggests cardiomegaly, pleural effusion  -follow cultures, unremarkable  -On LVQ, continue for now     Probable Acute on chronic diastolic CHF, unclear NYHA class  The patient does not have acute CHF  -CXR as above  -pro BNP elevated  -IV lasix given 3/6  -Echo unremarkable  -Appreciate Cardiology, gentle diuresis.       Acute hypoxic respiratory failure  -Wean off supp oxygen as able     Mild hyponatremia: monitor  Hypertension: holding home meds for now  Paroxysmal A fib: restart Coreg  Recent UTI: finished a 6 day course  CKD stage III: stable  Mild PCM: prealbumin, nutrition consult  Dementia: supportive care     DIET: ADULT DIET Regular; Safety Tray. ISOLATION PRECAUTIONS: There are currently no Active Isolations  CODE STATUS: Full code   DVT PROPHYLAXIS: SCDs  FUNCTIONAL STATUS PRIOR TO HOSPITALIZATION:   Fully active and ambulatory; able to carry on all self-care without restriction. Ambulatory status/function: By self   EARLY MOBILITY ASSESSMENT:   Recommend routine ambulation while hospitalized with the assistance of nursing staff. Communication: I communicated with patient/family and RN. I spoke with patient daughter over the phone. ANTICIPATED DISCHARGE: 24. ANTICIPATED DISPOSITION: Home vs SNF. Follow up with cardiology SGLT2 inhibitor prior to discharge. Review of Systems:     Review of Systems:  Symptom  Y/N  Comments   Symptom  Y/N  Comments    Fever/Chills   n   Chest Pain  n    Poor Appetite  n    Edema  n     Cough  n   Abdominal Pain   n    Sputum  n   Joint Pain  n    SOB/COREY  n   Pruritis/Rash      Nausea/vomit  n   Tolerating PT/OT      Diarrhea  n   Tolerating Diet      Constipation     Other      Could not obtain due to:         Objective:   Physical Exam:     Visit Vitals  BP (!) 138/57 (BP 1 Location: Right upper arm, BP Patient Position: At rest)   Pulse 94   Temp 98.4 °F (36.9 °C)   Resp 30   Ht 5' 5\" (1.651 m)   Wt 63.3 kg (139 lb 8.8 oz)   SpO2 94%   BMI 23.22 kg/m²    O2 Flow Rate (L/min): 91 l/min O2 Device: None (Room air)    Temp (24hrs), Av °F (37.2 °C), Min:98.4 °F (36.9 °C), Max:99.6 °F (37.6 °C)    No intake/output data recorded.  1901 -  0700  In: -   Out: 1300 [Urine:1300]      General:  Alert, cooperative, no distress, appears stated age. Lungs:   Clear to auscultation bilaterally.    Chest wall:  No tenderness or deformity. Heart:  Regular rate and rhythm, S1, S2 normal, no murmur. Abdomen:   Soft, non-tender. Bowel sounds normal.    Extremities: Extremities normal, atraumatic, no cyanosis or edema. Pulses: 2+ and symmetric all extremities. Skin: Skin color, texture, turgor normal. No rashes or lesions   Neurologic: Patient has clear voice. Data Review:       Recent Days:  Recent Labs     03/09/23 0425 03/08/23 0011 03/07/23 0447   WBC 14.1* 10.6 11.8*   HGB 12.8 9.5* 12.0   HCT 40.4 29.7* 36.8    197 216     Recent Labs     03/09/23 0425 03/08/23 0011 03/07/23 0447   * 137 134*   K 4.5 4.1 4.2    104 102   CO2 20* 28 26   * 86 102*   BUN 39* 43* 32*   CREA 1.33* 1.39* 1.46*   CA 9.6 8.6 8.8   ALB  --   --  2.6*   ALT  --   --  14     No results for input(s): PH, PCO2, PO2, HCO3, FIO2 in the last 72 hours. 24 Hour Results:  Recent Results (from the past 24 hour(s))   CBC WITH AUTOMATED DIFF    Collection Time: 03/09/23  4:25 AM   Result Value Ref Range    WBC 14.1 (H) 3.6 - 11.0 K/uL    RBC 4.04 3.80 - 5.20 M/uL    HGB 12.8 11.5 - 16.0 g/dL    HCT 40.4 35.0 - 47.0 %    .0 (H) 80.0 - 99.0 FL    MCH 31.7 26.0 - 34.0 PG    MCHC 31.7 30.0 - 36.5 g/dL    RDW 12.5 11.5 - 14.5 %    PLATELET 294 121 - 310 K/uL    MPV 10.7 8.9 - 12.9 FL    NRBC 0.0 0  WBC    ABSOLUTE NRBC 0.00 0.00 - 0.01 K/uL    NEUTROPHILS 58 32 - 75 %    LYMPHOCYTES 20 12 - 49 %    MONOCYTES 11 5 - 13 %    EOSINOPHILS 9 (H) 0 - 7 %    BASOPHILS 1 0 - 1 %    IMMATURE GRANULOCYTES 1 (H) 0.0 - 0.5 %    ABS. NEUTROPHILS 8.2 (H) 1.8 - 8.0 K/UL    ABS. LYMPHOCYTES 2.8 0.8 - 3.5 K/UL    ABS. MONOCYTES 1.6 (H) 0.0 - 1.0 K/UL    ABS. EOSINOPHILS 1.3 (H) 0.0 - 0.4 K/UL    ABS. BASOPHILS 0.1 0.0 - 0.1 K/UL    ABS. IMM.  GRANS. 0.1 (H) 0.00 - 0.04 K/UL    DF SMEAR SCANNED      RBC COMMENTS MACROCYTOSIS  1+       METABOLIC PANEL, BASIC    Collection Time: 03/09/23  4:25 AM   Result Value Ref Range    Sodium 134 (L) 136 - 145 mmol/L    Potassium 4.5 3.5 - 5.1 mmol/L    Chloride 105 97 - 108 mmol/L    CO2 20 (L) 21 - 32 mmol/L    Anion gap 9 5 - 15 mmol/L    Glucose 111 (H) 65 - 100 mg/dL    BUN 39 (H) 6 - 20 MG/DL    Creatinine 1.33 (H) 0.55 - 1.02 MG/DL    BUN/Creatinine ratio 29 (H) 12 - 20      eGFR 36 (L) >60 ml/min/1.73m2    Calcium 9.6 8.5 - 10.1 MG/DL   GLUCOSE, POC    Collection Time: 03/09/23 12:04 PM   Result Value Ref Range    Glucose (POC) 105 65 - 117 mg/dL    Performed by Argenis HAQ)        Problem List:  Problem List as of 3/9/2023 Date Reviewed: 7/19/2020            Codes Class Noted - Resolved    Fever ICD-10-CM: R50.9  ICD-9-CM: 780.60  3/5/2023 - Present        Acute kidney injury superimposed on chronic kidney disease ([facility] ICD-10-CM: N17.9, N18.9  ICD-9-CM: 584.9, 585.9  3/5/2023 - Present        Sepsis with acute hypoxic respiratory failure without septic shock, due to unspecified organism [facility]) ICD-10-CM: A41.9, R65.20, J96.01  ICD-9-CM: 038.9, 995.91, 518.81  3/5/2023 - Present        Acute respiratory failure (HCC) ICD-10-CM: J96.00  ICD-9-CM: 518.81  7/22/2020 - Present        CHF (congestive heart failure) (HCC) ICD-10-CM: I50.9  ICD-9-CM: 428.0  7/19/2020 - Present           Medications reviewed  Current Facility-Administered Medications   Medication Dose Route Frequency    [Held by provider] albumin human 25% (BUMINATE) solution 12.5 g  12.5 g IntraVENous Q6H    levoFLOXacin (LEVAQUIN) 500 mg in D5W IVPB  500 mg IntraVENous Q48H    resveratroL cap 100 mg (Patient Supplied)  100 mg Oral QHS    rivaroxaban (XARELTO) tablet 15 mg  15 mg Oral DAILY    sodium chloride (NS) flush 5-40 mL  5-40 mL IntraVENous Q8H    sodium chloride (NS) flush 5-40 mL  5-40 mL IntraVENous PRN    acetaminophen (TYLENOL) tablet 650 mg  650 mg Oral Q6H PRN    Or    acetaminophen (TYLENOL) suppository 650 mg  650 mg Rectal Q6H PRN    polyethylene glycol (MIRALAX) packet 17 g  17 g Oral DAILY PRN    ondansetron (Jonel Done ODT) tablet 4 mg  4 mg Oral Q8H PRN    Or    ondansetron (ZOFRAN) injection 4 mg  4 mg IntraVENous Q6H PRN    spironolactone (ALDACTONE) tablet 12.5 mg  12.5 mg Oral DAILY    sodium chloride (NS) flush 5-10 mL  5-10 mL IntraVENous PRN       Care Plan discussed with: Patient/Family, Nurse, and     Total time spent with patient: 30 minutes.     Stephanie Cormier MD

## 2023-03-09 NOTE — PROGRESS NOTES
Bedside shift change report given to Alycia Briggs RN (oncoming nurse) by Jayleen Judd (offgoing nurse). Report included the following information SBAR, Kardex, Intake/Output, MAR, and Cardiac Rhythm Afib .

## 2023-03-09 NOTE — PROGRESS NOTES
Problem: Self Care Deficits Care Plan (Adult)  Goal: *Acute Goals and Plan of Care (Insert Text)  Description: FUNCTIONAL STATUS PRIOR TO ADMISSION: Patient was modified independent using a rollator for functional mobility. Patient required minimal assistance for basic and instrumental ADLs. Gets help with showers only, otherwise completes ADLs independently. HOME SUPPORT: Patient was staying at [facility] and was at [address] [place] [address], requiring assist for showers and IADLs such as food prep and medication management. Occupational Therapy Goals  Initiated 3/7/2023  1. Patient will perform self-feeding with independence within 7 day(s). 2.  Patient will perform grooming in standing with modified independence within 7 day(s). 3.  Patient will perform lower body dressing with minimal assistance/contact guard assist within 7 day(s). 4.  Patient will perform toilet transfers with supervision/set-up within 7 day(s). 5.  Patient will perform all aspects of toileting with supervision/set-up within 7 day(s). 6.  Patient will participate in upper extremity therapeutic exercise/activities with independence for 10 minutes within 7 day(s). 7.  Patient will utilize energy conservation techniques during functional activities with verbal and visual cues within 7 day(s). Outcome: Progressing Towards Goal  OCCUPATIONAL THERAPY TREATMENT  Patient: Daniel Desai (89 y.o. female)  Date: 3/9/2023  Diagnosis: Sepsis with acute hypoxic respiratory failure without septic shock, due to unspecified organism ([facility] [A41.9, R65.20, J96.01]  Acute kidney injury superimposed on chronic kidney disease ([facility] [N17.9, N18.9]  Fever [R50.9] <principal problem not specified>      Precautions: Fall, Bed Alarm  Chart, occupational therapy assessment, plan of care, and goals were reviewed. ASSESSMENT  Patient continues with skilled OT services and is progressing towards goals.   Patient presents with generalized weakness, continued baseline cognitive deficits, and coordination deficits with self care. Patient received supine in bed. Patient transitioned from supine to sitting edge of bed with mod assist and transitioned from sitting to standing with min assist and use of a gait belt. Patient ambulated from bed to reclining chair with min assist. While in chair patient brushed teeth and combed her hair with set up assist. Patient left with in recliner with call bell and telephone in reach. Patient would benefit from skilled OT services during admission to improve independence with self care and functional mobility/transfers. Recommend discharge to SNF at this time. 03/09/23 1132 03/09/23 1135 03/09/23 1140   Vital Signs   Pulse (Heart Rate) 99 (!) 109 (!) 110   Heart Rate Source Monitor Monitor Monitor   BP (!) 141/52 (!) 138/51 (!) 144/55   MAP (Calculated) 82 80 85   BP 1 Location Right upper arm Right upper arm Right upper arm   BP 1 Method Automatic Automatic Automatic   BP Patient Position At rest;Lying Sitting Sitting  (after transfer to recliner)   O2 Sat (%) 90 %  --   --    O2 Device None (Room air)  --   --    O2 Flow Rate (L/min)  --   --   --       03/09/23 1143   Vital Signs   Pulse (Heart Rate) 100   Heart Rate Source  --    BP (!) 147/81   MAP (Calculated) 103   BP 1 Location Right upper arm   BP 1 Method Automatic   BP Patient Position Sitting   O2 Sat (%)  --    O2 Device None (Room air)   O2 Flow Rate (L/min) 91 l/min     Current Level of Function Impacting Discharge (ADLs): Up to mod assist with functional mobility and set up assist with self care    Other factors to consider for discharge: Family support, from independent living facility         PLAN :  Patient continues to benefit from skilled intervention to address the above impairments. Continue treatment per established plan of care to address goals.     Recommend with staff: continue to assist with transfers and basic self care tasks, bedside commode if appropriate with blood pressure     Recommend next OT session: Functional mobility activities such as toilet transfers or bed transfers. Participation in basic care tasks such as brushing teeth, washing face, combing hair and upper body dressing. Recommendation for discharge: (in order for the patient to meet his/her long term goals)  Therapy up to 5 days/week in SNF setting    This discharge recommendation:  Has been made in collaboration with the attending provider and/or case management    IF patient discharges home will need the following DME: bedside commode and 24/7 care       SUBJECTIVE:   Patient stated My son goes to the senior living to do an art exhibit.     OBJECTIVE DATA SUMMARY:   Cognitive/Behavioral Status:  Neurologic State: Alert;Confused  Orientation Level: Oriented to person  Cognition: Follows commands;Memory loss             Functional Mobility and Transfers for ADLs:  Bed Mobility:  Supine to Sit: Moderate assistance;Assist x2  Scooting: Minimum assistance    Transfers:  Sit to Stand: Minimum assistance     Bed to Chair: Minimum assistance;Assist x2    Balance:  Sitting: Impaired; Without support  Sitting - Static: Good (unsupported)  Sitting - Dynamic: Fair (occasional)  Standing: Impaired; With support  Standing - Static: Fair  Standing - Dynamic : Fair    ADL Intervention:       Grooming  Grooming Assistance: Set-up  Position Performed: Seated in chair  Brushing Teeth: Set-up  Brushing/Combing Hair: Set-up         Type of Bath: Chlorhexidine (CHG); Full         Upper [address]: Minimum  assistance                       Pain:  None reported    Activity Tolerance:   Good    After treatment patient left in no apparent distress:   Sitting in chair, Call bell within reach, and Bed / chair alarm activated    COMMUNICATION/COLLABORATION:   The patients plan of care was discussed with: Physical therapist and Registered nurse.      Geovanny Ford OTR/L  Time Calculation: 19 mins

## 2023-03-10 VITALS
RESPIRATION RATE: 25 BRPM | SYSTOLIC BLOOD PRESSURE: 158 MMHG | WEIGHT: 136.5 LBS | BODY MASS INDEX: 22.74 KG/M2 | OXYGEN SATURATION: 97 % | HEIGHT: 65 IN | HEART RATE: 90 BPM | DIASTOLIC BLOOD PRESSURE: 63 MMHG | TEMPERATURE: 97.9 F

## 2023-03-10 LAB
ALBUMIN SERPL-MCNC: 4 G/DL (ref 3.5–5)
ALBUMIN/GLOB SERPL: 1.3 (ref 1.1–2.2)
ALP SERPL-CCNC: 44 U/L (ref 45–117)
ALT SERPL-CCNC: 23 U/L (ref 12–78)
ANION GAP SERPL CALC-SCNC: 8 MMOL/L (ref 5–15)
AST SERPL-CCNC: 25 U/L (ref 15–37)
BACTERIA SPEC CULT: NORMAL
BACTERIA SPEC CULT: NORMAL
BASOPHILS # BLD: 0.1 K/UL (ref 0–0.1)
BASOPHILS NFR BLD: 1 % (ref 0–1)
BILIRUB SERPL-MCNC: 1 MG/DL (ref 0.2–1)
BUN SERPL-MCNC: 43 MG/DL (ref 6–20)
BUN/CREAT SERPL: 33 (ref 12–20)
CALCIUM SERPL-MCNC: 9.5 MG/DL (ref 8.5–10.1)
CHLORIDE SERPL-SCNC: 103 MMOL/L (ref 97–108)
CO2 SERPL-SCNC: 29 MMOL/L (ref 21–32)
CREAT SERPL-MCNC: 1.32 MG/DL (ref 0.55–1.02)
DIFFERENTIAL METHOD BLD: ABNORMAL
EOSINOPHIL # BLD: 1.4 K/UL (ref 0–0.4)
EOSINOPHIL NFR BLD: 11 % (ref 0–7)
ERYTHROCYTE [DISTWIDTH] IN BLOOD BY AUTOMATED COUNT: 12.5 % (ref 11.5–14.5)
GLOBULIN SER CALC-MCNC: 3 G/DL (ref 2–4)
GLUCOSE SERPL-MCNC: 100 MG/DL (ref 65–100)
HCT VFR BLD AUTO: 38 % (ref 35–47)
HGB BLD-MCNC: 12 G/DL (ref 11.5–16)
IMM GRANULOCYTES # BLD AUTO: 0.1 K/UL (ref 0–0.04)
IMM GRANULOCYTES NFR BLD AUTO: 1 % (ref 0–0.5)
LYMPHOCYTES # BLD: 3.1 K/UL (ref 0.8–3.5)
LYMPHOCYTES NFR BLD: 24 % (ref 12–49)
MCH RBC QN AUTO: 31 PG (ref 26–34)
MCHC RBC AUTO-ENTMCNC: 31.6 G/DL (ref 30–36.5)
MCV RBC AUTO: 98.2 FL (ref 80–99)
MONOCYTES # BLD: 1.4 K/UL (ref 0–1)
MONOCYTES NFR BLD: 11 % (ref 5–13)
NEUTS SEG # BLD: 6.7 K/UL (ref 1.8–8)
NEUTS SEG NFR BLD: 52 % (ref 32–75)
NRBC # BLD: 0 K/UL (ref 0–0.01)
NRBC BLD-RTO: 0 PER 100 WBC
PLATELET # BLD AUTO: 228 K/UL (ref 150–400)
PMV BLD AUTO: 10.7 FL (ref 8.9–12.9)
POTASSIUM SERPL-SCNC: 4.4 MMOL/L (ref 3.5–5.1)
PROT SERPL-MCNC: 7 G/DL (ref 6.4–8.2)
RBC # BLD AUTO: 3.87 M/UL (ref 3.8–5.2)
RBC MORPH BLD: ABNORMAL
SARS-COV-2 RDRP RESP QL NAA+PROBE: NOT DETECTED
SERVICE CMNT-IMP: NORMAL
SERVICE CMNT-IMP: NORMAL
SODIUM SERPL-SCNC: 140 MMOL/L (ref 136–145)
SOURCE, COVRS: NORMAL
WBC # BLD AUTO: 12.8 K/UL (ref 3.6–11)

## 2023-03-10 PROCEDURE — 74011250637 HC RX REV CODE- 250/637: Performed by: HOSPITALIST

## 2023-03-10 PROCEDURE — 74011000250 HC RX REV CODE- 250: Performed by: NURSE PRACTITIONER

## 2023-03-10 PROCEDURE — 85025 COMPLETE CBC W/AUTO DIFF WBC: CPT

## 2023-03-10 PROCEDURE — 77010033678 HC OXYGEN DAILY

## 2023-03-10 PROCEDURE — 80053 COMPREHEN METABOLIC PANEL: CPT

## 2023-03-10 PROCEDURE — 74011250637 HC RX REV CODE- 250/637: Performed by: NURSE PRACTITIONER

## 2023-03-10 PROCEDURE — 74011250637 HC RX REV CODE- 250/637: Performed by: INTERNAL MEDICINE

## 2023-03-10 PROCEDURE — 87635 SARS-COV-2 COVID-19 AMP PRB: CPT

## 2023-03-10 PROCEDURE — 36415 COLL VENOUS BLD VENIPUNCTURE: CPT

## 2023-03-10 RX ORDER — SPIRONOLACTONE 25 MG/1
12.5 TABLET ORAL DAILY
Qty: 15 TABLET | Refills: 0 | Status: SHIPPED
Start: 2023-03-10 | End: 2023-04-09

## 2023-03-10 RX ORDER — METOPROLOL SUCCINATE 25 MG/1
12.5 TABLET, EXTENDED RELEASE ORAL DAILY
Qty: 15 TABLET | Refills: 0 | Status: SHIPPED
Start: 2023-03-10 | End: 2023-04-09

## 2023-03-10 RX ORDER — METOPROLOL SUCCINATE 25 MG/1
12.5 TABLET, EXTENDED RELEASE ORAL DAILY
Qty: 15 TABLET | Refills: 0 | Status: SHIPPED | OUTPATIENT
Start: 2023-03-10 | End: 2023-03-10 | Stop reason: SDUPTHER

## 2023-03-10 RX ORDER — LEVOFLOXACIN 500 MG/1
500 TABLET, FILM COATED ORAL DAILY
Qty: 1 TABLET | Refills: 0 | Status: SHIPPED
Start: 2023-03-11 | End: 2023-03-12

## 2023-03-10 RX ADMIN — SODIUM CHLORIDE, PRESERVATIVE FREE 10 ML: 5 INJECTION INTRAVENOUS at 05:22

## 2023-03-10 RX ADMIN — RIVAROXABAN 15 MG: 15 TABLET, FILM COATED ORAL at 09:12

## 2023-03-10 RX ADMIN — SPIRONOLACTONE 12.5 MG: 25 TABLET ORAL at 09:12

## 2023-03-10 RX ADMIN — METOPROLOL SUCCINATE 12.5 MG: 25 TABLET, EXTENDED RELEASE ORAL at 09:12

## 2023-03-10 NOTE — PROGRESS NOTES
Bedside shift change report given to Nevin Salcedo RN (oncoming nurse) by Joie Garcia (offgoing nurse). Report included the following information SBAR, Kardex, Intake/Output, MAR, and Cardiac Rhythm afib .

## 2023-03-10 NOTE — PROGRESS NOTES
Physician Progress Note      [facility] #:                  [id number]  :                       1923  ADMIT DATE:       3/5/2023 8:03 PM  [address] [place] DATE:  RESPONDING  PROVIDER #:        Leni Crews MD          QUERY TEXT:    Patient admitted with sepsis. Documentation reflects SOHAIL superimposed on CKD in the active hospital problems. Pt with no increase in SCr by greater than or equal to 0.3 mg/dl w/i 48 hours; or an inc/dec in SCr to greater than or equal to 1.5 times baseline per guidelines below. If possible, please document in the progress notes and discharge summary if SOHAIL was: The medical record reflects the following:  Risk Factors: CKD3, sepsis    Clinical Indicators:    Cr 1.53-1.46-1.39-1.33-1.32 (Cr 1.41 in May 2022)    Treatment: IVFs    Thank you,  Marcello Scott RN, BSN, CRCR, CCDS, SMART  Clinical Documentation Improvement  Sukhjinder [email or web address]. [email or web address]  [phone] or via [employer]  Options provided:  -- SOHAIL confirmed after study as evidence by, Please provide evidenc.   -- SOHAIL ruled out after study  -- Other - I will add my own diagnosis  -- Disagree - Not applicable / Not valid  -- Disagree - Clinically unable to determine / Unknown  -- Refer to Clinical Documentation Reviewer    PROVIDER RESPONSE TEXT:    SOHAIL confirmed after study as evidenced by > 0.3 rise from baseline    Query created by: Juanita Klein on 3/10/2023 1:07 PM      Electronically signed by:  Leni Crews MD 3/10/2023 5:41 PM

## 2023-03-10 NOTE — PROGRESS NOTES
Bedside and Verbal shift change report given to [address] [place] [address] (oncoming nurse) by Elizabeth Salcedo RN (offgoing nurse). Report included the following information SBAR, Kardex, ED Summary, MAR, and Cardiac Rhythm Afib .

## 2023-03-10 NOTE — PROGRESS NOTES
Problem: Pressure Injury - Risk of  Goal: *Prevention of pressure injury  Description: Document Reji Scale and appropriate interventions in the flowsheet. Outcome: Progressing Towards Goal  Note: Pressure Injury Interventions:  Sensory Interventions: Assess changes in LOC, Avoid rigorous massage over bony prominences, Discuss PT/OT consult with provider, Float heels    Moisture Interventions: Absorbent underpads, Maintain skin hydration (lotion/cream)    Activity Interventions: PT/OT evaluation    Mobility Interventions: Pressure redistribution bed/mattress (bed type)    Nutrition Interventions: Document food/fluid/supplement intake, Offer support with meals,snacks and hydration    Friction and Shear Interventions: HOB 30 degrees or less       Problem: Patient Education: Go to Patient Education Activity  Goal: Patient/Family Education  Outcome: Progressing Towards Goal     Problem: Falls - Risk of  Goal: *Absence of Falls  Description: Document Janet Fall Risk and appropriate interventions in the flowsheet.   Outcome: Progressing Towards Goal  Note: Fall Risk Interventions:       Problem: Patient Education: Go to Patient Education Activity  Goal: Patient/Family Education  Outcome: Progressing Towards Goal     Problem: Patient Education: Go to Patient Education Activity  Goal: Patient/Family Education  Outcome: Progressing Towards Goal     Problem: Patient Education: Go to Patient Education Activity  Goal: Patient/Family Education  Outcome: Progressing Towards Goal

## 2023-03-10 NOTE — PROGRESS NOTES
Cardiology Progress Note  3/10/2023     Admit Date: 3/5/2023  Admit Diagnosis: Sepsis with acute hypoxic respiratory failure without septic shock, due to unspecified organism ([facility] [A41.9, R65.20, J96.01]  Acute kidney injury superimposed on chronic kidney disease ([address] [N17.9, N18.9]  Fever [R50.9]  CC: none currently    Assessment:   Active Problems:    Fever (3/5/2023)      Acute kidney injury superimposed on chronic kidney disease ([address] (3/5/2023)      Sepsis with acute hypoxic respiratory failure without septic shock, due to unspecified organism [facility]) (3/5/2023)    Plan:     Echo unremarkable  Cont current cardiac meds  OK with DC from cardiac perspective  Subjective:      Carlos Tilley has no cardiac c/o      Objective:    Physical Exam:  Overall VSSAF;  Visit Vitals  /65 (BP 1 Location: Left upper arm, BP Patient Position: At rest)   Pulse 82   Temp 98.1 °F (36.7 °C)   Resp 27   Ht 5' 5\" (1.651 m)   Wt 61.9 kg (136 lb 8 oz)   SpO2 97%   BMI 22.71 kg/m²     Temp (24hrs), Av.5 °F (36.9 °C), Min:98.1 °F (36.7 °C), Max:98.8 °F (37.1 °C)    Patient Vitals for the past 8 hrs:   Pulse   03/10/23 1200 82   03/10/23 1111 78   03/10/23 1000 78   03/10/23 0743 81      Patient Vitals for the past 8 hrs:   Resp   03/10/23 1111 27   03/10/23 0743 23      Patient Vitals for the past 8 hrs:   BP   03/10/23 1111 128/65   03/10/23 0743 (!) 145/76        / 1901 - 03/10 0700  In: -   Out: 1400 [Urine:1400]      General Appearance: Well developed, well nourished, no acute distress. Ears/Nose/Mouth/Throat:   Normal MM; anicteric. JVP: WNL   Resp:   Lungs clear to auscultation bilaterally. Nl resp effort. Cardiovascular:  irreg   Abdomen:   Soft, non-tender, bowel sounds are present. Extremities: No edema bilaterally. Skin:  Neuro: Warm and dry.   A/O x3, grossly nonfocal                         Data Review:     Telemetry independently reviewed :   AF       AF  Labs:   Recent Results (from the past 24 hour(s))   CBC WITH AUTOMATED DIFF    Collection Time: 03/10/23  4:38 AM   Result Value Ref Range    WBC 12.8 (H) 3.6 - 11.0 K/uL    RBC 3.87 3.80 - 5.20 M/uL    HGB 12.0 11.5 - 16.0 g/dL    HCT 38.0 35.0 - 47.0 %    MCV 98.2 80.0 - 99.0 FL    MCH 31.0 26.0 - 34.0 PG    MCHC 31.6 30.0 - 36.5 g/dL    RDW 12.5 11.5 - 14.5 %    PLATELET 210 350 - 861 K/uL    MPV 10.7 8.9 - 12.9 FL    NRBC 0.0 0  WBC    ABSOLUTE NRBC 0.00 0.00 - 0.01 K/uL    NEUTROPHILS 52 32 - 75 %    LYMPHOCYTES 24 12 - 49 %    MONOCYTES 11 5 - 13 %    EOSINOPHILS 11 (H) 0 - 7 %    BASOPHILS 1 0 - 1 %    IMMATURE GRANULOCYTES 1 (H) 0.0 - 0.5 %    ABS. NEUTROPHILS 6.7 1.8 - 8.0 K/UL    ABS. LYMPHOCYTES 3.1 0.8 - 3.5 K/UL    ABS. MONOCYTES 1.4 (H) 0.0 - 1.0 K/UL    ABS. EOSINOPHILS 1.4 (H) 0.0 - 0.4 K/UL    ABS. BASOPHILS 0.1 0.0 - 0.1 K/UL    ABS. IMM. GRANS. 0.1 (H) 0.00 - 0.04 K/UL    DF SMEAR SCANNED      RBC COMMENTS MACROCYTOSIS  1+       METABOLIC PANEL, COMPREHENSIVE    Collection Time: 03/10/23  4:38 AM   Result Value Ref Range    Sodium 140 136 - 145 mmol/L    Potassium 4.4 3.5 - 5.1 mmol/L    Chloride 103 97 - 108 mmol/L    CO2 29 21 - 32 mmol/L    Anion gap 8 5 - 15 mmol/L    Glucose 100 65 - 100 mg/dL    BUN 43 (H) 6 - 20 MG/DL    Creatinine 1.32 (H) 0.55 - 1.02 MG/DL    BUN/Creatinine ratio 33 (H) 12 - 20      eGFR 36 (L) >60 ml/min/1.73m2    Calcium 9.5 8.5 - 10.1 MG/DL    Bilirubin, total 1.0 0.2 - 1.0 MG/DL    ALT (SGPT) 23 12 - 78 U/L    AST (SGOT) 25 15 - 37 U/L    Alk.  phosphatase 44 (L) 45 - 117 U/L    Protein, total 7.0 6.4 - 8.2 g/dL    Albumin 4.0 3.5 - 5.0 g/dL    Globulin 3.0 2.0 - 4.0 g/dL    A-G Ratio 1.3 1.1 - 2.2     COVID-19 RAPID TEST    Collection Time: 03/10/23 10:45 AM   Result Value Ref Range    Specimen source Nasopharyngeal      COVID-19 rapid test Not detected NOTD        Current medications reviewed       Katia Harris MD

## 2023-03-10 NOTE — PROGRESS NOTES
KELLY:  RUR: 14%  LOW     Disposition:  SNF  Insurance auth obtained. Pily Perez # [id number]  Start date 3/10/23-3/14/23  Care Coord [email or web address]  Fax [phone]         [facility] accepted. Facility is requesting rapid covid test.  CM notifying MD.    Mushtaq Siegel BSW, CRM  [phone]    Rapid covid resulted negative. CM informed Maria Elena at [facility]. CM arranging Transport. Transition of Care Plan to SNF/Rehab    SNF/Rehab Transition:  Patient has been accepted to [facility] and meets criteria for admission. Patient will transported by [facility] and expected to leave at 4pm.    Communication to Patient/Family:  Met with patient and spoke with brandon Brizuela (identified care giver) and they are agreeable to the transition plan. Communication to SNF/Rehab:  Bedside RN, Chetna Alejandro, has been notified to update the transition plan to the facility and call report (phone number [phone]). Discharge information has been updated on the AVS.     Discharge instructions included in d/c packet. Nursing Please include all hard scripts for controlled substances, med rec and dc summary, and AVS in packet. Reviewed and confirmed with facility, [facility] they can manage the patient care needs for the following:     Dilcia Peña with (X) only those applicable:    Medication:  [x]  Medications will be available at the facility  []  IV Antibiotics   []  Controlled Substance - hard copy to be sent with patient   []  Weekly Labs   Documents:  [x] Hard RX  [x] MAR  [x] Kardex  [x] AVS  [x]Transfer Summary  [x]Discharge   Equipment:  []  CPAP/BiPAP  []  Wound Vacuum  []  [place] or Urinary Device  []  PICC/Central Line  []  Nebulizer  []  Ventilator   Treatment:  []Isolation (for MRSA, VRE, etc.)  []Surgical Drain Management  []Tracheostomy Care  []Dressing Changes  []Dialysis with transportation and chair time .   []PEG Care  []Oxygen  []Daily Weights for Heart Failure   Dietary:  []Any diet limitations  []Tube Feedings   []Total Parenteral Management (TPN)   Eligible for Medicaid Long Term Services and Supports  Yes:  [] Eligible for medical assistance or will become eligible within 180 days and UAI completed. [] Provider/Patient and/or support system has requested screening. [] UAI copy provided to patient or responsible party, .  [] UAI unavailable at discharge will send once processed to SNF provider. [] UAI unavailable at discharged mailed to patient  No:   [] Private pay and is not financially eligible for Medicaid within the next 180 days. [] Reside out-of-state. [] A residents of a state owned/operated facility that is licensed  by [address] and [employer] or [facility]  [] Enrollment in [facility] hospice services  [] [address]  [] Patient /Family declines to have screening completed or provide financial information for screening     Financial Resources:  Medicaid    [] Initiated and application pending   [] Full coverage     Advanced Care Plan:  []Surrogate Decision Maker of Care  []POA  []Communicated Code Status (, \"Full\")    Other    D/C packet placed on hard chart  David Brooks, [address], [address] Copy of pcs placed on hard chart.

## 2023-03-10 NOTE — ACP (ADVANCE CARE PLANNING)
Advance Care Planning     Advance Care Planning (ACP) Physician/NP/PA Conversation      Date of Conversation: 3/5/2023  Conducted with: Patient with Decision Making Capacity and Healthcare Decision Maker: Named in Advance Directive or Healthcare Power of [address]:     Primary Decision Maker: Roberto Fagan - Daughter - [phone]  Click here to complete [address] including selection of the Healthcare Decision Maker Relationship (ie \"Primary\")    Today we documented Decision Maker(s) consistent with Legal Next of Kin hierarchy. Care Preferences:    Hospitalization: \"If your health worsens and it becomes clear that your chance of recovery is unlikely, what would be your preference regarding hospitalization? \"  The patient would prefer hospitalization. Ventilation: \"If you were unable to breathe on your own and your chance of recovery was unlikely, what would be your preference about the use of a ventilator (breathing machine) if it was available to you? \"   The patient is unsure. Resuscitation: \"In the event your heart stopped as a result of an underlying serious health condition, would you want attempts to be made to restart your heart, or would you prefer a natural death? \"   The patient is unsure.     Additional topics discussed: treatment goals, benefit/burden of treatment options, ventilation preferences, hospitalization preferences, and resuscitation preferences    Conversation Outcomes / Follow-Up Plan:   ACP complete - no further action today  Reviewed DNR/DNI and patient elects Full Code (Attempt Resuscitation)     Length of Voluntary ACP Conversation in minutes:  16 minutes    Geoff Lee MD

## 2023-03-10 NOTE — DISCHARGE SUMMARY
Discharge Summary       PATIENT ID: Layla Villeda  MRN: [id number]   YOB: 1923    DATE OF ADMISSION: 3/5/2023  8:03 PM    DATE OF DISCHARGE: 03/10/23    PRIMARY CARE PROVIDER: Tenzin Alcala MD     ATTENDING PHYSICIAN: Ramirez Albarran MD   DISCHARGING PROVIDER: Ramirez Albarran MD    To contact this individual call [phone] and ask the  to page. If unavailable ask to be transferred the Adult Hospitalist Department. CONSULTATIONS: IP CONSULT TO CARDIOLOGY    PROCEDURES/SURGERIES: * No surgery found *    ADMITTING [address] [place] [address] COURSE:   Sky Rahman is a 80 y.o. female who presents with cough and fever. She was febrile to 102.1. She has a history of atrial fibrillation, reported CHF, hypertension and dementia. Normally lives at [facility]. She was brought to freestanding emergency department where work-up included chest radiograph which showed interstitial edema, elevated BNP, white count of 15. She was given a dose of levofloxacin and referred to the hospitalist service at our facility for further management. At the moment of the initial interview she was oriented however very poor memory. She did have cough however states that she felt that her breathing was fine. She remains on 2 L of supplemental oxygen. She was afebrile at the moment of the encounter, speaking in complete sentences. \"        DISCHARGE DIAGNOSES / PLAN:      RRT called for hypotension 3/7: resolved  responded with IV albumin and discontinued ARB, hold BB.   -Hold albumin now.    -hypotension again 3/8  -Echo unremarkable  -resumed lower dose aldactone, switch Coreg to low-dose Toprol 12.5 daily  -Appreciate Cardiology, stable for discharge from their standpoint     Sepsis ?viral etiology  Cough/fever  -procalcitonin low  -CXR suggests cardiomegaly, pleural effusion  -follow cultures, unremarkable  -On LVQ, continue for now     Probable Acute on chronic diastolic CHF, unclear NYHA class  The patient does not have acute CHF  -CXR as above  -pro BNP elevated  -IV lasix given 3/6  -Echo unremarkable  -Appreciate Cardiology, s/p gentle diuresis. Acute hypoxic respiratory failure  -Wean off supp oxygen as able     Mild hyponatremia: monitor  Hypertension: holding home meds for now  Paroxysmal A fib: restart Coreg  Ecoli UTI: Complete Levaquin p.o. last dose 3/11  CKD stage III: stable  Mild PCM: prealbumin, nutrition consult  Dementia: supportive care    Spoke about goals of care with patient and with daughter over the phone they will have a family discussion outpatient for readdressing goals of care. For now patient is to remain full code    Medically stable for discharge to SNF. Spoke to daughter  Dereck Dolan updated on plan          PEND    FOLLOW UP APPOINTMENTS:    Follow-up Information       Follow up With Specialties Details Why Contact Info    Jacinta Church MD [facility] Medicine     [facility]  [address] [facility] 74      [facility], [facility] Vascular Surgery, Cardiovascular Disease Physician Call in 1 day(s)  [address]  [phone]               ADDITIONAL CARE RECOMMENDATIONS: Repeat CBC, BMP 3 to 5 days    DIET: Resume previous diet    ACTIVITY: Activity as tolerated        DISCHARGE MEDICATIONS:  Current Discharge Medication List        START taking these medications    Details   levoFLOXacin (Levaquin) 500 mg tablet Take 1 Tablet by mouth daily for 1 day. Qty: 1 Tablet, Refills: 0  Start date: 3/11/2023, End date: 3/12/2023      metoprolol succinate (TOPROL-XL) 25 mg XL tablet Take 0.5 Tablets by mouth daily for 30 days. Qty: 15 Tablet, Refills: 0  Start date: 3/10/2023, End date: 4/9/2023           CONTINUE these medications which have CHANGED    Details   spironolactone (ALDACTONE) 25 mg tablet Take 0.5 Tablets by mouth daily for 30 days.  Indications: atrial fibrillation per pt's daughter  Qty: 13 Tablet, Refills: 0  Start date: 3/10/2023, End date: 4/9/2023           CONTINUE these medications which have NOT CHANGED    Details   co-enzyme Q-10 (CO Q-10) 100 mg capsule Take  by mouth.      calcium polycarbophiL (FIBERCON) 625 mg tablet Take 625 mg by mouth two (2) times a day. ubidecarenone/vitamin E mixed (COQ10  PO) Take 100 mg by mouth daily. ascorbic acid, vitamin C, (VITAMIN C) 500 mg tablet Take 500 mg by mouth daily. cholecalciferol (Vitamin D3) (5000 Units/125 mcg) tab tablet Take 5,000 Units by mouth daily. cranberry 500 mg capsule Take 500 mg by mouth daily. multivit-min/ferrous fumarate (MULTI VITAMIN PO) Take 1 Tab by mouth daily. Lactobacillus acidophilus (Probiotic) 10 billion cell cap Take 1 Tab by mouth daily. rivaroxaban (Xarelto) 15 mg tab tablet Take 15 mg by mouth daily. STOP taking these medications       irbesartan (AVAPRO) 300 mg tablet Comments:   Reason for Stopping:         carvediloL (COREG) 12.5 mg tablet Comments:   Reason for Stopping:                 NOTIFY YOUR PHYSICIAN FOR ANY OF THE FOLLOWING:   Fever over 101 degrees for 24 hours. Chest pain, shortness of breath, fever, chills, nausea, vomiting, diarrhea, change in mentation, falling, weakness, bleeding. Severe pain or pain not relieved by medications. Or, any other signs or symptoms that you may have questions about.     DISPOSITION:    Home With:   OT  PT  HH  RN      x Long term SNF/Inpatient Rehab    Independent/assisted living    Hospice    Other:       PATIENT CONDITION AT DISCHARGE:     Functional status    Poor    x Deconditioned     Independent      Cognition     Lucid     Forgetful    x Dementia      Catheters/lines (plus indication)    [place]     PICC     PEG    x None      Code status    x Full code     DNR      PHYSICAL EXAMINATION AT DISCHARGE:    General : alert x 3, awake, no acute distress,   HEENT: PEERL, EOMI, moist mucus membrane, TM clear  Neck: supple, no JVD, no meningeal signs  Chest: Clear to auscultation bilaterally   CVS: S1 S2 heard, Capillary refill less than 2 seconds  Abd: soft/ Non tender, non distended, BS physiological,   Ext: no clubbing, no cyanosis, no edema, brisk 2+ DP pulses  Neuro/Psych: pleasant mood and affect, CN 2-12 grossly intact, sensory grossly within normal limit, Strength 5/5 in all extremities, DTR 1+ x 4  Skin: warm     CHRONIC MEDICAL DIAGNOSES:  Problem List as of 3/10/2023 Date Reviewed: 7/19/2020            Codes Class Noted - Resolved    Fever ICD-10-CM: R50.9  ICD-9-CM: 780.60  3/5/2023 - Present        Acute kidney injury superimposed on chronic kidney disease ([facility] ICD-10-CM: N17.9, N18.9  ICD-9-CM: 584.9, 585.9  3/5/2023 - Present        Sepsis with acute hypoxic respiratory failure without septic shock, due to unspecified organism [facility]) ICD-10-CM: A41.9, R65.20, J96.01  ICD-9-CM: 038.9, 995.91, 518.81  3/5/2023 - Present        Acute respiratory failure (HCC) ICD-10-CM: J96.00  ICD-9-CM: 518.81  7/22/2020 - Present        CHF (congestive heart failure) (HCC) ICD-10-CM: I50.9  ICD-9-CM: 428.0  7/19/2020 - Present           Greater than 31 minutes were spent with the patient on counseling and coordination of care    Signed:   Frederic Avery MD  3/10/2023  12:58 PM

## 2023-03-10 NOTE — PROGRESS NOTES
Pt discharge to [facility] for rehabilitation. Left via stretcher on 2L n/c. A/O x2, no distress noted. IV discontinued. Pt's daughter received AVS and stated understanding of instructions. Report given to [facility] RN at the facility.

## 2023-03-12 NOTE — PROGRESS NOTES
Physician Progress Note      Gabriele Ballard  [facility] #:                  [id number]  :                       1923  ADMIT DATE:       3/5/2023 8:03 PM  Gonsalo Reyes DATE:        3/10/2023 6:28 PM  RESPONDING  PROVIDER #:        Althea Briceño MD          QUERY TEXT:    Patient admitted with sepsis. Noted documentation of acute on chronic dCHF and \"patient does not have acute CHF\" in DC Summary. If possible, please document in progress notes and discharge summary if you are evaluating and /or treating any of the following: The medical record reflects the following:  Risk Factors: dCHF    Clinical Indicators:    pBNP 2723  CXR- CHF: Cardiomegaly, interstitial edema, left pleural effusion. Echo-  -  Left? Ventricle: Normal left ventricular systolic function with a visually estimated EF of 60 - 65%. Left ventricle size is normal. Normal wall thickness. Normal wall motion. Normal diastolic function.  -  Right? Ventricle: Not well visualized. Reduced systolic function. -  Left? Atrium: Not well visualized. Left atrium is dilated. -  Right? Atrium: Not well visualized. Right atrium is dilated. -  Pericardium: Small (<1 cm) pericardial effusion present. No indication of cardiac tamponade. Treatment: IV Lasix; Echo    Thank you,  Ranjana Harmon RN, BSN, CRCR, CCDS, SMART  Clinical Documentation Improvement  Bertha Hernandez. [email or web address]  [phone] or via [employer]  Options provided:  -- Acute on chronic dCHF  -- Chronic dCHF  -- Other - I will add my own diagnosis  -- Disagree - Not applicable / Not valid  -- Disagree - Clinically unable to determine / Unknown  -- Refer to Clinical Documentation Reviewer    PROVIDER RESPONSE TEXT:    This patient has acute on chronic dCHF confirmed.     Query created by: Roseann neumann 3/10/2023 1:18 PM      Electronically signed by:  Althea Briceño MD 3/12/2023 11:02 AM

## 2023-03-13 NOTE — PROGRESS NOTES
Physician Progress Note      Sandee Hawkins  CSN #:                  [id number]  :                       1923  ADMIT DATE:       3/5/2023 8:03 PM  [address] DATE:        3/10/2023 6:28 PM  RESPONDING  PROVIDER #:        Hilton Gilbert MD          QUERY TEXT:    Patient admitted with sepsis. Noted documentation of \"sepsis ? viral etiology\" in DCS. If possible, please document in progress notes and discharge summary the source of sepsis:    The medical record reflects the following:  Risk Factors: 89yo lives at [facility]    Clinical Indicators:    WBC 15.1-11.8-10.6-14.1-12.8  LA 1.9  Procal 0.19  T 100.7  HR   RR 21-36  on 3/7 started with low BPs 90/40, 78/39, 88/35, 83/36    Treatment: Levaquin; Cefepime; Albumin; IVFs    Thank you,  Kristian Javier RN, BSN, CRCR, CCDS, SMART  Clinical Documentation Improvement  [email or web address]  [phone] or via Perfect Serve  Options provided:  -- Sepsis, present on admission, due to, Please document source. -- Sepsis, present on admission, now resolved, due to, Please document source. -- Sepsis ruled out  -- Other - I will add my own diagnosis  -- Disagree - Not applicable / Not valid  -- Disagree - Clinically unable to determine / Unknown  -- Refer to Clinical Documentation Reviewer    PROVIDER RESPONSE TEXT:    This patient does not have sepsis.     Query created by: Rocio Stern on 3/13/2023 2:57 PM      Electronically signed by:  Hilton Gilbert MD 3/13/2023 4:11 PM

## 2023-04-17 PROBLEM — I50.33 DIASTOLIC CHF, ACUTE ON CHRONIC (HCC): Status: ACTIVE | Noted: 2023-04-17

## 2024-12-15 ENCOUNTER — APPOINTMENT (OUTPATIENT)
Facility: HOSPITAL | Age: 88
DRG: 871 | End: 2024-12-15
Payer: MEDICARE

## 2024-12-15 ENCOUNTER — HOSPITAL ENCOUNTER (INPATIENT)
Facility: HOSPITAL | Age: 88
LOS: 1 days | DRG: 871 | End: 2024-12-17
Attending: EMERGENCY MEDICINE | Admitting: FAMILY MEDICINE
Payer: MEDICARE

## 2024-12-15 ENCOUNTER — APPOINTMENT (OUTPATIENT)
Facility: HOSPITAL | Age: 88
DRG: 871 | End: 2024-12-15
Attending: EMERGENCY MEDICINE
Payer: MEDICARE

## 2024-12-15 DIAGNOSIS — M79.605 LOW BACK PAIN RADIATING TO LEFT LEG: ICD-10-CM

## 2024-12-15 DIAGNOSIS — J18.9 PNEUMONIA DUE TO INFECTIOUS ORGANISM, UNSPECIFIED LATERALITY, UNSPECIFIED PART OF LUNG: Primary | ICD-10-CM

## 2024-12-15 DIAGNOSIS — R06.03 RESPIRATORY DISTRESS: ICD-10-CM

## 2024-12-15 DIAGNOSIS — I50.9 CONGESTIVE HEART FAILURE, UNSPECIFIED HF CHRONICITY, UNSPECIFIED HEART FAILURE TYPE (HCC): ICD-10-CM

## 2024-12-15 DIAGNOSIS — R79.89 ELEVATED TROPONIN: ICD-10-CM

## 2024-12-15 DIAGNOSIS — R79.89 ELEVATED BRAIN NATRIURETIC PEPTIDE (BNP) LEVEL: ICD-10-CM

## 2024-12-15 DIAGNOSIS — A41.9 SEPTICEMIA (HCC): ICD-10-CM

## 2024-12-15 DIAGNOSIS — R20.0 LEFT LEG NUMBNESS: ICD-10-CM

## 2024-12-15 DIAGNOSIS — M54.50 LOW BACK PAIN RADIATING TO LEFT LEG: ICD-10-CM

## 2024-12-15 LAB
ALBUMIN SERPL-MCNC: 3.5 G/DL (ref 3.5–5)
ALBUMIN/GLOB SERPL: 0.8 (ref 1.1–2.2)
ALP SERPL-CCNC: 76 U/L (ref 45–117)
ALT SERPL-CCNC: 21 U/L (ref 12–78)
ANION GAP BLD CALC-SCNC: 15 (ref 10–20)
ANION GAP SERPL CALC-SCNC: 11 MMOL/L (ref 2–12)
APPEARANCE UR: CLEAR
AST SERPL-CCNC: 27 U/L (ref 15–37)
BACTERIA URNS QL MICRO: ABNORMAL /HPF
BASE DEFICIT BLD-SCNC: 5.4 MMOL/L
BASOPHILS # BLD: 0.1 K/UL (ref 0–0.1)
BASOPHILS NFR BLD: 1 % (ref 0–1)
BILIRUB SERPL-MCNC: 0.4 MG/DL (ref 0.2–1)
BILIRUB UR QL: NEGATIVE
BUN SERPL-MCNC: 41 MG/DL (ref 6–20)
BUN/CREAT SERPL: 22 (ref 12–20)
CA-I BLD-MCNC: 1.12 MMOL/L (ref 1.15–1.33)
CALCIUM SERPL-MCNC: 9.5 MG/DL (ref 8.5–10.1)
CHLORIDE BLD-SCNC: 105 MMOL/L (ref 100–111)
CHLORIDE SERPL-SCNC: 102 MMOL/L (ref 97–108)
CO2 BLD-SCNC: 21 MMOL/L (ref 22–29)
CO2 SERPL-SCNC: 24 MMOL/L (ref 21–32)
COLOR UR: ABNORMAL
COMMENT:: NORMAL
CREAT SERPL-MCNC: 1.88 MG/DL (ref 0.55–1.02)
CREAT UR-MCNC: 1.6 MG/DL (ref 0.6–1.3)
DIFFERENTIAL METHOD BLD: ABNORMAL
EOSINOPHIL # BLD: 0.2 K/UL (ref 0–0.4)
EOSINOPHIL NFR BLD: 1 % (ref 0–7)
EPITH CASTS URNS QL MICRO: ABNORMAL /LPF
ERYTHROCYTE [DISTWIDTH] IN BLOOD BY AUTOMATED COUNT: 13.3 % (ref 11.5–14.5)
GLOBULIN SER CALC-MCNC: 4.3 G/DL (ref 2–4)
GLUCOSE BLD STRIP.AUTO-MCNC: 219 MG/DL (ref 74–99)
GLUCOSE SERPL-MCNC: 243 MG/DL (ref 65–100)
GLUCOSE UR STRIP.AUTO-MCNC: NEGATIVE MG/DL
HCO3 BLDA-SCNC: 21 MMOL/L
HCT VFR BLD AUTO: 41 % (ref 35–47)
HGB BLD-MCNC: 12.9 G/DL (ref 11.5–16)
HGB UR QL STRIP: NEGATIVE
HYALINE CASTS URNS QL MICRO: ABNORMAL /LPF (ref 0–5)
IMM GRANULOCYTES # BLD AUTO: 0.3 K/UL (ref 0–0.04)
IMM GRANULOCYTES NFR BLD AUTO: 1 % (ref 0–0.5)
KETONES UR QL STRIP.AUTO: ABNORMAL MG/DL
LACTATE BLD-SCNC: 4.12 MMOL/L (ref 0.4–2)
LEUKOCYTE ESTERASE UR QL STRIP.AUTO: ABNORMAL
LYMPHOCYTES # BLD: 3.2 K/UL (ref 0.8–3.5)
LYMPHOCYTES NFR BLD: 14 % (ref 12–49)
MCH RBC QN AUTO: 31 PG (ref 26–34)
MCHC RBC AUTO-ENTMCNC: 31.5 G/DL (ref 30–36.5)
MCV RBC AUTO: 98.6 FL (ref 80–99)
MONOCYTES # BLD: 1.1 K/UL (ref 0–1)
MONOCYTES NFR BLD: 5 % (ref 5–13)
NEUTS SEG # BLD: 17.6 K/UL (ref 1.8–8)
NEUTS SEG NFR BLD: 78 % (ref 32–75)
NITRITE UR QL STRIP.AUTO: NEGATIVE
NRBC # BLD: 0 K/UL (ref 0–0.01)
NRBC BLD-RTO: 0 PER 100 WBC
NT PRO BNP: 4686 PG/ML
PCO2 BLDV: 44.7 MMHG (ref 41–51)
PH BLDV: 7.29 (ref 7.32–7.42)
PH UR STRIP: 5 (ref 5–8)
PLATELET # BLD AUTO: 203 K/UL (ref 150–400)
PMV BLD AUTO: 10.7 FL (ref 8.9–12.9)
PO2 BLDV: 35 MMHG (ref 25–40)
POTASSIUM BLD-SCNC: 3.9 MMOL/L (ref 3.5–5.5)
POTASSIUM SERPL-SCNC: 3.2 MMOL/L (ref 3.5–5.1)
PROCALCITONIN SERPL-MCNC: 0.11 NG/ML
PROT SERPL-MCNC: 7.8 G/DL (ref 6.4–8.2)
PROT UR STRIP-MCNC: ABNORMAL MG/DL
RBC # BLD AUTO: 4.16 M/UL (ref 3.8–5.2)
RBC #/AREA URNS HPF: ABNORMAL /HPF (ref 0–5)
SAO2 % BLD: 59 % (ref 94–98)
SERVICE CMNT-IMP: ABNORMAL
SODIUM BLD-SCNC: 141 MMOL/L (ref 136–145)
SODIUM SERPL-SCNC: 137 MMOL/L (ref 136–145)
SP GR UR REFRACTOMETRY: 1.02 (ref 1–1.03)
SPECIMEN HOLD: NORMAL
SPECIMEN SITE: ABNORMAL
TROPONIN I SERPL HS-MCNC: 1164 NG/L (ref 0–51)
URINE CULTURE IF INDICATED: ABNORMAL
UROBILINOGEN UR QL STRIP.AUTO: 0.2 EU/DL (ref 0.2–1)
WBC # BLD AUTO: 22.4 K/UL (ref 3.6–11)
WBC URNS QL MICRO: ABNORMAL /HPF (ref 0–4)

## 2024-12-15 PROCEDURE — 80053 COMPREHEN METABOLIC PANEL: CPT

## 2024-12-15 PROCEDURE — 70450 CT HEAD/BRAIN W/O DYE: CPT

## 2024-12-15 PROCEDURE — 87088 URINE BACTERIA CULTURE: CPT

## 2024-12-15 PROCEDURE — 2500000003 HC RX 250 WO HCPCS: Performed by: EMERGENCY MEDICINE

## 2024-12-15 PROCEDURE — 99285 EMERGENCY DEPT VISIT HI MDM: CPT

## 2024-12-15 PROCEDURE — 87040 BLOOD CULTURE FOR BACTERIA: CPT

## 2024-12-15 PROCEDURE — 83880 ASSAY OF NATRIURETIC PEPTIDE: CPT

## 2024-12-15 PROCEDURE — 74176 CT ABD & PELVIS W/O CONTRAST: CPT

## 2024-12-15 PROCEDURE — 87186 SC STD MICRODIL/AGAR DIL: CPT

## 2024-12-15 PROCEDURE — 82803 BLOOD GASES ANY COMBINATION: CPT

## 2024-12-15 PROCEDURE — 84484 ASSAY OF TROPONIN QUANT: CPT

## 2024-12-15 PROCEDURE — 84132 ASSAY OF SERUM POTASSIUM: CPT

## 2024-12-15 PROCEDURE — 96361 HYDRATE IV INFUSION ADD-ON: CPT

## 2024-12-15 PROCEDURE — 82947 ASSAY GLUCOSE BLOOD QUANT: CPT

## 2024-12-15 PROCEDURE — 84295 ASSAY OF SERUM SODIUM: CPT

## 2024-12-15 PROCEDURE — 71045 X-RAY EXAM CHEST 1 VIEW: CPT

## 2024-12-15 PROCEDURE — 85025 COMPLETE CBC W/AUTO DIFF WBC: CPT

## 2024-12-15 PROCEDURE — 81001 URINALYSIS AUTO W/SCOPE: CPT

## 2024-12-15 PROCEDURE — 87086 URINE CULTURE/COLONY COUNT: CPT

## 2024-12-15 PROCEDURE — 36415 COLL VENOUS BLD VENIPUNCTURE: CPT

## 2024-12-15 PROCEDURE — 2580000003 HC RX 258: Performed by: EMERGENCY MEDICINE

## 2024-12-15 PROCEDURE — 96374 THER/PROPH/DIAG INJ IV PUSH: CPT

## 2024-12-15 PROCEDURE — 84145 PROCALCITONIN (PCT): CPT

## 2024-12-15 PROCEDURE — 6360000002 HC RX W HCPCS: Performed by: EMERGENCY MEDICINE

## 2024-12-15 PROCEDURE — 82330 ASSAY OF CALCIUM: CPT

## 2024-12-15 RX ORDER — SODIUM CHLORIDE, SODIUM LACTATE, POTASSIUM CHLORIDE, AND CALCIUM CHLORIDE .6; .31; .03; .02 G/100ML; G/100ML; G/100ML; G/100ML
1000 INJECTION, SOLUTION INTRAVENOUS ONCE
Status: DISCONTINUED | OUTPATIENT
Start: 2024-12-15 | End: 2024-12-15

## 2024-12-15 RX ORDER — METOPROLOL TARTRATE 1 MG/ML
5 INJECTION, SOLUTION INTRAVENOUS ONCE
Status: COMPLETED | OUTPATIENT
Start: 2024-12-15 | End: 2024-12-15

## 2024-12-15 RX ORDER — MORPHINE SULFATE 2 MG/ML
2 INJECTION, SOLUTION INTRAMUSCULAR; INTRAVENOUS
Status: COMPLETED | OUTPATIENT
Start: 2024-12-15 | End: 2024-12-15

## 2024-12-15 RX ORDER — SODIUM CHLORIDE, SODIUM LACTATE, POTASSIUM CHLORIDE, AND CALCIUM CHLORIDE .6; .31; .03; .02 G/100ML; G/100ML; G/100ML; G/100ML
250 INJECTION, SOLUTION INTRAVENOUS
Status: COMPLETED | OUTPATIENT
Start: 2024-12-15 | End: 2024-12-15

## 2024-12-15 RX ADMIN — MORPHINE SULFATE 2 MG: 2 INJECTION, SOLUTION INTRAMUSCULAR; INTRAVENOUS at 20:34

## 2024-12-15 RX ADMIN — METOPROLOL TARTRATE 5 MG: 1 INJECTION, SOLUTION INTRAVENOUS at 23:46

## 2024-12-15 RX ADMIN — SODIUM CHLORIDE, SODIUM LACTATE, POTASSIUM CHLORIDE, AND CALCIUM CHLORIDE 250 ML: 600; 310; 30; 20 INJECTION, SOLUTION INTRAVENOUS at 23:06

## 2024-12-15 ASSESSMENT — PAIN DESCRIPTION - PAIN TYPE: TYPE: ACUTE PAIN

## 2024-12-15 ASSESSMENT — PAIN SCALES - GENERAL: PAINLEVEL_OUTOF10: 7

## 2024-12-15 ASSESSMENT — PAIN DESCRIPTION - DESCRIPTORS: DESCRIPTORS: ACHING;SHOOTING

## 2024-12-15 ASSESSMENT — PAIN - FUNCTIONAL ASSESSMENT: PAIN_FUNCTIONAL_ASSESSMENT: 0-10

## 2024-12-15 ASSESSMENT — PAIN DESCRIPTION - ORIENTATION: ORIENTATION: LEFT

## 2024-12-15 ASSESSMENT — PAIN DESCRIPTION - LOCATION: LOCATION: LEG

## 2024-12-15 NOTE — ED TRIAGE NOTES
Pt arrives via EMS from [place] for LEFT leg pain that starts at the hip and radiates down the LEFT leg. Pt reports tingling. Pt poor historian d/t dementia. Facility denies any known injury.

## 2024-12-16 ENCOUNTER — APPOINTMENT (OUTPATIENT)
Facility: HOSPITAL | Age: 88
DRG: 871 | End: 2024-12-16
Attending: FAMILY MEDICINE
Payer: MEDICARE

## 2024-12-16 ENCOUNTER — APPOINTMENT (OUTPATIENT)
Facility: HOSPITAL | Age: 88
DRG: 871 | End: 2024-12-16
Attending: EMERGENCY MEDICINE
Payer: MEDICARE

## 2024-12-16 PROBLEM — Z51.5 PALLIATIVE CARE ENCOUNTER: Status: ACTIVE | Noted: 2024-12-16

## 2024-12-16 PROBLEM — R65.20 SEPSIS WITH MULTIPLE ORGAN DYSFUNCTION (MOD) (HCC): Status: ACTIVE | Noted: 2023-03-05

## 2024-12-16 PROBLEM — M79.605 PAIN OF LEFT LOWER EXTREMITY: Status: ACTIVE | Noted: 2024-12-16

## 2024-12-16 PROBLEM — R41.0 CONFUSION: Status: ACTIVE | Noted: 2024-12-16

## 2024-12-16 PROBLEM — A41.9 SEPSIS WITH MULTIPLE ORGAN DYSFUNCTION (MOD) (HCC): Status: ACTIVE | Noted: 2023-03-05

## 2024-12-16 PROBLEM — I48.91 ATRIAL FIBRILLATION WITH RAPID VENTRICULAR RESPONSE (HCC): Status: ACTIVE | Noted: 2024-12-16

## 2024-12-16 PROBLEM — F03.A0 MILD DEMENTIA WITHOUT BEHAVIORAL DISTURBANCE, PSYCHOTIC DISTURBANCE, MOOD DISTURBANCE, OR ANXIETY (HCC): Status: ACTIVE | Noted: 2024-12-16

## 2024-12-16 LAB
ALBUMIN SERPL-MCNC: 3.2 G/DL (ref 3.5–5)
ALBUMIN/GLOB SERPL: 0.8 (ref 1.1–2.2)
ALP SERPL-CCNC: 70 U/L (ref 45–117)
ALT SERPL-CCNC: 28 U/L (ref 12–78)
ANION GAP SERPL CALC-SCNC: 13 MMOL/L (ref 2–12)
APTT PPP: 22.8 SEC (ref 22.1–31)
AST SERPL-CCNC: 53 U/L (ref 15–37)
B PERT DNA SPEC QL NAA+PROBE: NOT DETECTED
BILIRUB SERPL-MCNC: 0.3 MG/DL (ref 0.2–1)
BORDETELLA PARAPERTUSSIS BY PCR: NOT DETECTED
BUN SERPL-MCNC: 46 MG/DL (ref 6–20)
BUN/CREAT SERPL: 23 (ref 12–20)
C PNEUM DNA SPEC QL NAA+PROBE: NOT DETECTED
CALCIUM SERPL-MCNC: 9.3 MG/DL (ref 8.5–10.1)
CHLORIDE SERPL-SCNC: 105 MMOL/L (ref 97–108)
CO2 SERPL-SCNC: 18 MMOL/L (ref 21–32)
COMMENT:: NORMAL
COMMENT:: NORMAL
CREAT SERPL-MCNC: 2.01 MG/DL (ref 0.55–1.02)
ECHO AO ROOT DIAM: 3.1 CM
ECHO AO ROOT INDEX: 1.75 CM/M2
ECHO AV AREA PEAK VELOCITY: 1.3 CM2
ECHO AV AREA/BSA PEAK VELOCITY: 0.7 CM2/M2
ECHO AV PEAK GRADIENT: 4 MMHG
ECHO AV PEAK VELOCITY: 0.9 M/S
ECHO AV VELOCITY RATIO: 0.78
ECHO BSA: 1.81 M2
ECHO LA DIAMETER INDEX: 2.54 CM/M2
ECHO LA DIAMETER: 4.5 CM
ECHO LA TO AORTIC ROOT RATIO: 1.45
ECHO LV E' SEPTAL VELOCITY: 5.42 CM/S
ECHO LV EF PHYSICIAN: 25 %
ECHO LV FRACTIONAL SHORTENING: 22 % (ref 28–44)
ECHO LV INTERNAL DIMENSION DIASTOLE INDEX: 2.32 CM/M2
ECHO LV INTERNAL DIMENSION DIASTOLIC: 4.1 CM (ref 3.9–5.3)
ECHO LV INTERNAL DIMENSION SYSTOLIC INDEX: 1.81 CM/M2
ECHO LV INTERNAL DIMENSION SYSTOLIC: 3.2 CM
ECHO LV IVSD: 0.8 CM (ref 0.6–0.9)
ECHO LV MASS 2D: 81.7 G (ref 67–162)
ECHO LV MASS INDEX 2D: 46.1 G/M2 (ref 43–95)
ECHO LV POSTERIOR WALL DIASTOLIC: 0.6 CM (ref 0.6–0.9)
ECHO LV RELATIVE WALL THICKNESS RATIO: 0.29
ECHO LVOT AREA: 1.8 CM2
ECHO LVOT DIAM: 1.5 CM
ECHO LVOT PEAK GRADIENT: 2 MMHG
ECHO LVOT PEAK VELOCITY: 0.7 M/S
ECHO MV E VELOCITY: 0.91 M/S
ECHO MV E/E' SEPTAL: 16.79
ECHO RV FREE WALL PEAK S': 5.9 CM/S
ECHO RV TAPSE: 0.9 CM (ref 1.7–?)
ECHO TV REGURGITANT MAX VELOCITY: 3.48 M/S
ECHO TV REGURGITANT PEAK GRADIENT: 49 MMHG
EKG ATRIAL RATE: 97 BPM
EKG DIAGNOSIS: NORMAL
EKG Q-T INTERVAL: 412 MS
EKG QRS DURATION: 108 MS
EKG QTC CALCULATION (BAZETT): 536 MS
EKG R AXIS: -1 DEGREES
EKG T AXIS: 129 DEGREES
EKG VENTRICULAR RATE: 102 BPM
ERYTHROCYTE [DISTWIDTH] IN BLOOD BY AUTOMATED COUNT: 13.4 % (ref 11.5–14.5)
EST. AVERAGE GLUCOSE BLD GHB EST-MCNC: 108 MG/DL
FLUAV SUBTYP SPEC NAA+PROBE: NOT DETECTED
FLUBV RNA SPEC QL NAA+PROBE: NOT DETECTED
GLOBULIN SER CALC-MCNC: 4.1 G/DL (ref 2–4)
GLUCOSE BLD STRIP.AUTO-MCNC: 135 MG/DL (ref 65–117)
GLUCOSE BLD STRIP.AUTO-MCNC: 152 MG/DL (ref 65–117)
GLUCOSE BLD STRIP.AUTO-MCNC: 153 MG/DL (ref 65–117)
GLUCOSE SERPL-MCNC: 197 MG/DL (ref 65–100)
HADV DNA SPEC QL NAA+PROBE: NOT DETECTED
HBA1C MFR BLD: 5.4 % (ref 4–5.6)
HCOV 229E RNA SPEC QL NAA+PROBE: NOT DETECTED
HCOV HKU1 RNA SPEC QL NAA+PROBE: NOT DETECTED
HCOV NL63 RNA SPEC QL NAA+PROBE: NOT DETECTED
HCOV OC43 RNA SPEC QL NAA+PROBE: NOT DETECTED
HCT VFR BLD AUTO: 38 % (ref 35–47)
HGB BLD-MCNC: 12.2 G/DL (ref 11.5–16)
HMPV RNA SPEC QL NAA+PROBE: NOT DETECTED
HPIV1 RNA SPEC QL NAA+PROBE: NOT DETECTED
HPIV2 RNA SPEC QL NAA+PROBE: NOT DETECTED
HPIV3 RNA SPEC QL NAA+PROBE: NOT DETECTED
HPIV4 RNA SPEC QL NAA+PROBE: NOT DETECTED
INR PPP: 1.1 (ref 0.9–1.1)
LACTATE SERPL-SCNC: 3.6 MMOL/L (ref 0.4–2)
LACTATE SERPL-SCNC: 5.6 MMOL/L (ref 0.4–2)
LACTATE SERPL-SCNC: 6.5 MMOL/L (ref 0.4–2)
M PNEUMO DNA SPEC QL NAA+PROBE: NOT DETECTED
MAGNESIUM SERPL-MCNC: 2.5 MG/DL (ref 1.6–2.4)
MCH RBC QN AUTO: 31.4 PG (ref 26–34)
MCHC RBC AUTO-ENTMCNC: 32.1 G/DL (ref 30–36.5)
MCV RBC AUTO: 97.7 FL (ref 80–99)
NRBC # BLD: 0 K/UL (ref 0–0.01)
NRBC BLD-RTO: 0 PER 100 WBC
PLATELET # BLD AUTO: 164 K/UL (ref 150–400)
PMV BLD AUTO: 11.3 FL (ref 8.9–12.9)
POTASSIUM SERPL-SCNC: 4.8 MMOL/L (ref 3.5–5.1)
PROT SERPL-MCNC: 7.3 G/DL (ref 6.4–8.2)
PROTHROMBIN TIME: 11.1 SEC (ref 9–11.1)
RBC # BLD AUTO: 3.89 M/UL (ref 3.8–5.2)
RSV RNA SPEC QL NAA+PROBE: NOT DETECTED
RV+EV RNA SPEC QL NAA+PROBE: NOT DETECTED
SARS-COV-2 RNA RESP QL NAA+PROBE: NOT DETECTED
SERVICE CMNT-IMP: ABNORMAL
SODIUM SERPL-SCNC: 136 MMOL/L (ref 136–145)
SPECIMEN HOLD: NORMAL
SPECIMEN HOLD: NORMAL
THERAPEUTIC RANGE: NORMAL SECS (ref 58–77)
TROPONIN I SERPL HS-MCNC: 2952 NG/L (ref 0–51)
UFH PPP CHRO-ACNC: 0.84 IU/ML
UFH PPP CHRO-ACNC: 1.2 IU/ML
UFH PPP CHRO-ACNC: <0.1 IU/ML
WBC # BLD AUTO: 19.3 K/UL (ref 3.6–11)

## 2024-12-16 PROCEDURE — 1100000000 HC RM PRIVATE

## 2024-12-16 PROCEDURE — 82962 GLUCOSE BLOOD TEST: CPT

## 2024-12-16 PROCEDURE — 85520 HEPARIN ASSAY: CPT

## 2024-12-16 PROCEDURE — 6360000002 HC RX W HCPCS: Performed by: INTERNAL MEDICINE

## 2024-12-16 PROCEDURE — 93005 ELECTROCARDIOGRAM TRACING: CPT | Performed by: EMERGENCY MEDICINE

## 2024-12-16 PROCEDURE — 99223 1ST HOSP IP/OBS HIGH 75: CPT | Performed by: PHYSICAL MEDICINE & REHABILITATION

## 2024-12-16 PROCEDURE — 93925 LOWER EXTREMITY STUDY: CPT

## 2024-12-16 PROCEDURE — C8929 TTE W OR WO FOL WCON,DOPPLER: HCPCS

## 2024-12-16 PROCEDURE — 2500000003 HC RX 250 WO HCPCS: Performed by: FAMILY MEDICINE

## 2024-12-16 PROCEDURE — P9047 ALBUMIN (HUMAN), 25%, 50ML: HCPCS | Performed by: HOSPITALIST

## 2024-12-16 PROCEDURE — 80053 COMPREHEN METABOLIC PANEL: CPT

## 2024-12-16 PROCEDURE — 2580000003 HC RX 258: Performed by: FAMILY MEDICINE

## 2024-12-16 PROCEDURE — 6360000002 HC RX W HCPCS: Performed by: FAMILY MEDICINE

## 2024-12-16 PROCEDURE — 83605 ASSAY OF LACTIC ACID: CPT

## 2024-12-16 PROCEDURE — 0202U NFCT DS 22 TRGT SARS-COV-2: CPT

## 2024-12-16 PROCEDURE — 84484 ASSAY OF TROPONIN QUANT: CPT

## 2024-12-16 PROCEDURE — 2580000003 HC RX 258: Performed by: EMERGENCY MEDICINE

## 2024-12-16 PROCEDURE — 6360000002 HC RX W HCPCS: Performed by: HOSPITALIST

## 2024-12-16 PROCEDURE — 85027 COMPLETE CBC AUTOMATED: CPT

## 2024-12-16 PROCEDURE — 83036 HEMOGLOBIN GLYCOSYLATED A1C: CPT

## 2024-12-16 PROCEDURE — 6360000002 HC RX W HCPCS: Performed by: EMERGENCY MEDICINE

## 2024-12-16 PROCEDURE — 85610 PROTHROMBIN TIME: CPT

## 2024-12-16 PROCEDURE — 83735 ASSAY OF MAGNESIUM: CPT

## 2024-12-16 PROCEDURE — 85730 THROMBOPLASTIN TIME PARTIAL: CPT

## 2024-12-16 PROCEDURE — 96361 HYDRATE IV INFUSION ADD-ON: CPT

## 2024-12-16 PROCEDURE — 96375 TX/PRO/DX INJ NEW DRUG ADDON: CPT

## 2024-12-16 PROCEDURE — 6360000002 HC RX W HCPCS: Performed by: PHYSICAL MEDICINE & REHABILITATION

## 2024-12-16 PROCEDURE — 36415 COLL VENOUS BLD VENIPUNCTURE: CPT

## 2024-12-16 PROCEDURE — P9047 ALBUMIN (HUMAN), 25%, 50ML: HCPCS | Performed by: FAMILY MEDICINE

## 2024-12-16 RX ORDER — SODIUM CHLORIDE 9 MG/ML
INJECTION, SOLUTION INTRAVENOUS PRN
Status: DISCONTINUED | OUTPATIENT
Start: 2024-12-16 | End: 2024-12-17 | Stop reason: HOSPADM

## 2024-12-16 RX ORDER — SODIUM CHLORIDE 0.9 % (FLUSH) 0.9 %
5-40 SYRINGE (ML) INJECTION EVERY 12 HOURS SCHEDULED
Status: DISCONTINUED | OUTPATIENT
Start: 2024-12-16 | End: 2024-12-17 | Stop reason: HOSPADM

## 2024-12-16 RX ORDER — ACETAMINOPHEN 650 MG/1
650 SUPPOSITORY RECTAL EVERY 6 HOURS PRN
Status: DISCONTINUED | OUTPATIENT
Start: 2024-12-16 | End: 2024-12-17 | Stop reason: HOSPADM

## 2024-12-16 RX ORDER — ONDANSETRON 2 MG/ML
4 INJECTION INTRAMUSCULAR; INTRAVENOUS ONCE
Status: COMPLETED | OUTPATIENT
Start: 2024-12-16 | End: 2024-12-16

## 2024-12-16 RX ORDER — LORAZEPAM 2 MG/ML
0.5 INJECTION INTRAMUSCULAR ONCE
Status: COMPLETED | OUTPATIENT
Start: 2024-12-16 | End: 2024-12-16

## 2024-12-16 RX ORDER — HEPARIN SODIUM 1000 [USP'U]/ML
60 INJECTION, SOLUTION INTRAVENOUS; SUBCUTANEOUS ONCE
Status: COMPLETED | OUTPATIENT
Start: 2024-12-16 | End: 2024-12-16

## 2024-12-16 RX ORDER — DEXTROSE MONOHYDRATE 100 MG/ML
INJECTION, SOLUTION INTRAVENOUS CONTINUOUS PRN
Status: DISCONTINUED | OUTPATIENT
Start: 2024-12-16 | End: 2024-12-17 | Stop reason: HOSPADM

## 2024-12-16 RX ORDER — ASCORBIC ACID 500 MG
500 TABLET ORAL DAILY
Status: DISCONTINUED | OUTPATIENT
Start: 2024-12-16 | End: 2024-12-17

## 2024-12-16 RX ORDER — HEPARIN SODIUM 1000 [USP'U]/ML
60 INJECTION, SOLUTION INTRAVENOUS; SUBCUTANEOUS PRN
Status: DISCONTINUED | OUTPATIENT
Start: 2024-12-16 | End: 2024-12-17

## 2024-12-16 RX ORDER — ACETAMINOPHEN 325 MG/1
650 TABLET ORAL EVERY 6 HOURS PRN
Status: DISCONTINUED | OUTPATIENT
Start: 2024-12-16 | End: 2024-12-17 | Stop reason: HOSPADM

## 2024-12-16 RX ORDER — LORAZEPAM 2 MG/ML
0.5 INJECTION INTRAMUSCULAR EVERY 4 HOURS PRN
Status: DISCONTINUED | OUTPATIENT
Start: 2024-12-16 | End: 2024-12-17

## 2024-12-16 RX ORDER — HEPARIN SODIUM 10000 [USP'U]/100ML
5-30 INJECTION, SOLUTION INTRAVENOUS CONTINUOUS
Status: DISCONTINUED | OUTPATIENT
Start: 2024-12-16 | End: 2024-12-17

## 2024-12-16 RX ORDER — HEPARIN SODIUM 1000 [USP'U]/ML
30 INJECTION, SOLUTION INTRAVENOUS; SUBCUTANEOUS PRN
Status: DISCONTINUED | OUTPATIENT
Start: 2024-12-16 | End: 2024-12-17

## 2024-12-16 RX ORDER — 0.9 % SODIUM CHLORIDE 0.9 %
1000 INTRAVENOUS SOLUTION INTRAVENOUS ONCE
Status: COMPLETED | OUTPATIENT
Start: 2024-12-16 | End: 2024-12-16

## 2024-12-16 RX ORDER — SODIUM CHLORIDE, SODIUM LACTATE, POTASSIUM CHLORIDE, AND CALCIUM CHLORIDE .6; .31; .03; .02 G/100ML; G/100ML; G/100ML; G/100ML
250 INJECTION, SOLUTION INTRAVENOUS
Status: COMPLETED | OUTPATIENT
Start: 2024-12-16 | End: 2024-12-16

## 2024-12-16 RX ORDER — INSULIN LISPRO 100 [IU]/ML
0-4 INJECTION, SOLUTION INTRAVENOUS; SUBCUTANEOUS EVERY 6 HOURS SCHEDULED
Status: DISCONTINUED | OUTPATIENT
Start: 2024-12-16 | End: 2024-12-17

## 2024-12-16 RX ORDER — SODIUM CHLORIDE 0.9 % (FLUSH) 0.9 %
5-40 SYRINGE (ML) INJECTION PRN
Status: DISCONTINUED | OUTPATIENT
Start: 2024-12-16 | End: 2024-12-17 | Stop reason: HOSPADM

## 2024-12-16 RX ORDER — ALBUMIN (HUMAN) 12.5 G/50ML
25 SOLUTION INTRAVENOUS EVERY 6 HOURS
Status: COMPLETED | OUTPATIENT
Start: 2024-12-16 | End: 2024-12-16

## 2024-12-16 RX ORDER — FENTANYL CITRATE 50 UG/ML
20 INJECTION, SOLUTION INTRAMUSCULAR; INTRAVENOUS
Status: DISCONTINUED | OUTPATIENT
Start: 2024-12-16 | End: 2024-12-17 | Stop reason: HOSPADM

## 2024-12-16 RX ORDER — ALBUMIN (HUMAN) 12.5 G/50ML
25 SOLUTION INTRAVENOUS ONCE
Status: COMPLETED | OUTPATIENT
Start: 2024-12-16 | End: 2024-12-16

## 2024-12-16 RX ORDER — DIGOXIN 0.25 MG/ML
125 INJECTION INTRAMUSCULAR; INTRAVENOUS ONCE
Status: COMPLETED | OUTPATIENT
Start: 2024-12-16 | End: 2024-12-16

## 2024-12-16 RX ORDER — VITAMIN B COMPLEX
5000 TABLET ORAL DAILY
Status: DISCONTINUED | OUTPATIENT
Start: 2024-12-16 | End: 2024-12-17

## 2024-12-16 RX ADMIN — DOXYCYCLINE 100 MG: 100 INJECTION, POWDER, LYOPHILIZED, FOR SOLUTION INTRAVENOUS at 17:47

## 2024-12-16 RX ADMIN — ALBUMIN (HUMAN) 25 G: 0.25 INJECTION, SOLUTION INTRAVENOUS at 08:07

## 2024-12-16 RX ADMIN — FENTANYL CITRATE 20 MCG: 50 INJECTION INTRAMUSCULAR; INTRAVENOUS at 15:21

## 2024-12-16 RX ADMIN — WATER 1000 MG: 1 INJECTION INTRAMUSCULAR; INTRAVENOUS; SUBCUTANEOUS at 00:05

## 2024-12-16 RX ADMIN — SODIUM CHLORIDE, PRESERVATIVE FREE 10 ML: 5 INJECTION INTRAVENOUS at 21:43

## 2024-12-16 RX ADMIN — DIGOXIN 125 MCG: 0.25 INJECTION INTRAMUSCULAR; INTRAVENOUS at 13:03

## 2024-12-16 RX ADMIN — FENTANYL CITRATE 20 MCG: 50 INJECTION INTRAMUSCULAR; INTRAVENOUS at 18:51

## 2024-12-16 RX ADMIN — WATER 1000 MG: 1 INJECTION INTRAMUSCULAR; INTRAVENOUS; SUBCUTANEOUS at 22:35

## 2024-12-16 RX ADMIN — HEPARIN SODIUM 12 UNITS/KG/HR: 10000 INJECTION, SOLUTION INTRAVENOUS at 07:09

## 2024-12-16 RX ADMIN — SODIUM CHLORIDE: 9 INJECTION, SOLUTION INTRAVENOUS at 17:45

## 2024-12-16 RX ADMIN — DOXYCYCLINE 100 MG: 100 INJECTION, POWDER, LYOPHILIZED, FOR SOLUTION INTRAVENOUS at 06:39

## 2024-12-16 RX ADMIN — SODIUM CHLORIDE 1000 ML: 9 INJECTION, SOLUTION INTRAVENOUS at 06:48

## 2024-12-16 RX ADMIN — ONDANSETRON 4 MG: 2 INJECTION INTRAMUSCULAR; INTRAVENOUS at 00:09

## 2024-12-16 RX ADMIN — SODIUM CHLORIDE, SODIUM LACTATE, POTASSIUM CHLORIDE, AND CALCIUM CHLORIDE 250 ML: 600; 310; 30; 20 INJECTION, SOLUTION INTRAVENOUS at 00:01

## 2024-12-16 RX ADMIN — ALBUMIN (HUMAN) 25 G: 0.25 INJECTION, SOLUTION INTRAVENOUS at 06:38

## 2024-12-16 RX ADMIN — FENTANYL CITRATE 20 MCG: 50 INJECTION INTRAMUSCULAR; INTRAVENOUS at 21:41

## 2024-12-16 RX ADMIN — LORAZEPAM 0.5 MG: 2 INJECTION INTRAMUSCULAR; INTRAVENOUS at 01:22

## 2024-12-16 RX ADMIN — ONDANSETRON 4 MG: 2 INJECTION INTRAMUSCULAR; INTRAVENOUS at 07:44

## 2024-12-16 RX ADMIN — HEPARIN SODIUM 4000 UNITS: 1000 INJECTION INTRAVENOUS; SUBCUTANEOUS at 07:09

## 2024-12-16 RX ADMIN — ALBUMIN (HUMAN) 25 G: 0.25 INJECTION, SOLUTION INTRAVENOUS at 15:57

## 2024-12-16 RX ADMIN — LORAZEPAM 0.5 MG: 2 INJECTION INTRAMUSCULAR; INTRAVENOUS at 11:57

## 2024-12-16 ASSESSMENT — PAIN DESCRIPTION - LOCATION
LOCATION: GENERALIZED

## 2024-12-16 ASSESSMENT — PAIN SCALES - GENERAL
PAINLEVEL_OUTOF10: 0
PAINLEVEL_OUTOF10: 8
PAINLEVEL_OUTOF10: 7
PAINLEVEL_OUTOF10: 9

## 2024-12-16 ASSESSMENT — PAIN DESCRIPTION - DESCRIPTORS: DESCRIPTORS: ACHING

## 2024-12-16 NOTE — ED PROVIDER NOTES
EMERGENCY DEPARTMENT PHYSICIAN NOTE     Patient: Edison Burciaga     Time of Service: 12/15/2024  6:39 PM     Chief complaint:   Chief Complaint   Patient presents with    Leg Pain        HISTORY:  Patient is a 89 y.o. female who presents to the emergency department with complaints of leg pain.       Past Medical History:   Diagnosis Date    A-fib (HCC)     CHF (congestive heart failure) (HCC)     EF of 55-60%    Hypertension     TIA (transient ischemic attack)         Past Surgical History:   Procedure Laterality Date    CATARACT REMOVAL      TONSILLECTOMY          History reviewed. No pertinent family history.     Social History     Socioeconomic History    Marital status:      Spouse name: None    Number of children: None    Years of education: None    Highest education level: None   Tobacco Use    Smoking status: Former    Smokeless tobacco: Never   Substance and Sexual Activity    Alcohol use: Not Currently     Social Determinants of Health     Food Insecurity: No Food Insecurity (7/23/2024)    Received from [facility]    Hunger Vital Sign     Worried About Running Out of Food in the Last Year: Never true     Ran Out of Food in the Last Year: Never true   Transportation Needs: No Transportation Needs (7/23/2024)    Received from [facility]    PRAPARE - Transportation     Lack of Transportation (Medical): No     Lack of Transportation (Non-Medical): No   Housing Stability: Low Risk  (7/23/2024)    Received from [facility]    Housing Stability Vital Sign     Unable to Pay for Housing in the Last Year: No     Number of Times Moved in the Last Year: 0     Homeless in the Last Year: No        Current Medications: Reviewed in chart.    Allergies:   Allergies   Allergen Reactions    Penicillins Hives     In childhood          REVIEW OF SYSTEMS: See HPI for pertinent positives and negatives.      PHYSICAL EXAM:  /79   Pulse 58   Temp 97.6 °F (36.4 °C) (Axillary)

## 2024-12-16 NOTE — CONSULTS
CARDIOLOGY CONSULT                  Subjective:    Date of  Admission:   Admission type:Emergency    Peter Berger MD     This is a 89 yof with MMP who presented with back and leg pain. She had tried to get out of a chair when the pain began. Workup showed multiple abnormal lab and radiology values including an elevated troponin. She has not had any cardiac symptoms although she is a limited historian. She has been evaluated by Dr. Arana as arterial duplex to the E showed complete vessel occlusion.    Patient Active Problem List   Diagnosis    Acute respiratory failure    CHF (congestive heart failure) (HCC)    Fever    Acute kidney injury superimposed on chronic kidney disease (HCC)    Sepsis with multiple organ dysfunction (MOD) ([facility])    Diastolic CHF, acute on chronic (HCC)    Atrial fibrillation with rapid ventricular response ([facility])        Past Medical History:   Diagnosis Date    A-fib ([facility])     CHF (congestive heart failure) ([facility])     EF of 55-60%    Hypertension     TIA (transient ischemic attack)       Past Surgical History:   Procedure Laterality Date    CATARACT REMOVAL      TONSILLECTOMY        History reviewed. No pertinent family history.   Social History     Socioeconomic History    Marital status:      Spouse name: Not on file    Number of children: Not on file    Years of education: Not on file    Highest education level: Not on file   Occupational History    Not on file   Tobacco Use    Smoking status: Former    Smokeless tobacco: Never   Substance and Sexual Activity    Alcohol use: Not Currently    Drug use: Not on file    Sexual activity: Not on file   Other Topics Concern    Not on file   Social History Narrative    Not on file     Social Determinants of Health     Financial Resource Strain: Not on file   Food Insecurity: No Food Insecurity (7/23/2024)    [facility]    Hunger Vital Sign     Worried About Running Out of Food in the Last 
       Consult    Patient: Edison Burciaga MRN: [id number]  SSN: [id number]    YOB: 1923  Age: 89 y.o.  Sex: female      Subjective:      Edison Burciaga is a 89 y.o. female who is being seen for evaluation left lower extremity ischemia.    Past Medical History:   Diagnosis Date    A-fib (HCC)     CHF (congestive heart failure) (HCC)     EF of 55-60%    Hypertension     TIA (transient ischemic attack)      Past Surgical History:   Procedure Laterality Date    CATARACT REMOVAL      TONSILLECTOMY        History reviewed. No pertinent family history.  Social History     Tobacco Use    Smoking status: Former    Smokeless tobacco: Never   Substance Use Topics    Alcohol use: Not Currently      Current Facility-Administered Medications   Medication Dose Route Frequency Provider Last Rate Last Admin    sodium chloride flush 0.9 % injection 5-40 mL  5-40 mL IntraVENous 2 times per day Clay Mauro MD        sodium chloride flush 0.9 % injection 5-40 mL  5-40 mL IntraVENous PRN Clay Mauro MD        0.9 % sodium chloride infusion   IntraVENous PRN Clay Mauro MD        acetaminophen (TYLENOL) tablet 650 mg  650 mg Oral Q6H PRN Clay Mauro MD        Or    acetaminophen (TYLENOL) suppository 650 mg  650 mg Rectal Q6H PRN Clay Mauro MD        ascorbic acid (VITAMIN C) tablet 500 mg  500 mg Oral Daily Clay Mauro MD        polycarbophil (FIBERCON) tablet 625 mg  625 mg Oral BID Clay Mauro MD        Vitamin D (CHOLECALCIFEROL) tablet 5,000 Units  5,000 Units Oral Daily Clay Mauro MD        cefTRIAXone (ROCEPHIN) 1,000 mg in sterile water 10 mL IV syringe  1,000 mg IntraVENous Q24H Clay Mauro MD        doxycycline (VIBRAMYCIN) 100 mg in sodium chloride 0.9 % 100 mL IVPB (mini-bag)  100 mg IntraVENous Q12H Clay Mauro MD   Stopped at 12/16/24 0743    heparin (porcine) injection 4,000 Units  60 Units/kg IntraVENous PRN Clay Mauro MD        heparin 
Palliative Medicine  Patient Name: Edison Burciaga  YOB: 1923  MRN: [id number]  Age: 89 y.o.  Gender: female    Date of Initial Consult: 12/16/24  Date of Service: 12/16/2024  Time: 11:43 AM  Provider: Ellen Dinero MD  Hospital Day: 2  Admit Date: 12/15/2024  Referring Provider: Dr Epps        Reasons for Consultation:  Goals of Care    HISTORY OF PRESENT ILLNESS (HPI):   Edison Burciaga is a 89 y.o. female with a past medical history of dementia, a fib, HFpEF, HTN  who was admitted on 12/15/2024 from [facility] with lower back pain and confusion. Upon arrival, pt septic w/ elevated WBC and lactate, being treated for UTI and PNA. With ischemia of b/l LE, on heparin ggt- arterial duplex showing occlusion of all vessels in LLE. Vascular surgery evaluated- surgery is an option, but not recommended. MYRANDA on CKD from septic shock.     Psychosocial: Pt . She has 4 children- Eleanor (local), Jasmine (physician- child psychiatry and functional medicine), Samm and Vahe. They live in [place], [place].       PALLIATIVE DIAGNOSES:    LLE vascular occlusion   Septic shock  Agitation/confusion   LLE pain   MYRANDA on CKD  Mild dementia   Palliative care assessment     ASSESSMENT AND PLAN:   Along w/ Dr Mcguire, meet w/ pt - who is confused, but speaking and pleasant. Has been agitated and in pain overnight requiring prn symptom medications.   Meet outside with dtr Eleanor- she is in close communication w/ her 3 siblings.   Recent baseline:  Pt w/ dementia but mild- short term memory loss. She walks, eats and recognizes family.   Current condition:   Eleanor has met w/ Cardiology, Vascular surgery and hospitalist. She understands that while vascular surgery is an option- it carries very high risk and pt would likely be bed bound in best case scenario.   Family does not want to proceed w/ surgery. The medical team supports this decision.   As we cannot cure/fix the LLE w/out surgery - feel that pressors and CRRT would not be 
Conjunctiva without pallor ,erythema.  The sclerae without icterus. .   Neck:Supple,no mass palpable  Lungs : Clears to auscultation Bilaterally, Normal respiratory effort  CVS: RRR, S1 S2 normal, No rub, cold LLE, trace R LE edema  Abdomen: Soft, Non tender, No hepatosplenomegaly, bowel sounds present  Extremities: No cyanosis, No clubbing  Skin: No rash or lesions.  Lymph nodes: No palpable nodes  MS: No joint swelling, erythema, warmth  Neurologic: non focal, AAO x 3  Psych: normal affect    Laboratory Results:    Lab Results   Component Value Date    CREATININE 2.01 (H) 12/16/2024    BUN 46 (H) 12/16/2024     12/16/2024    K 4.8 12/16/2024     12/16/2024    CO2 18 (L) 12/16/2024       Lab Results   Component Value Date    CREATININE 2.01 (H) 12/16/2024    CREATININE 1.6 (H) 12/15/2024    CREATININE 1.88 (H) 12/15/2024    CREATININE 1.32 (H) 03/10/2023    CREATININE 1.33 (H) 03/09/2023    BUN 46 (H) 12/16/2024    BUN 41 (H) 12/15/2024    BUN 43 (H) 03/10/2023    BUN 39 (H) 03/09/2023    BUN 43 (H) 03/08/2023    K 4.8 12/16/2024    K 3.2 (L) 12/15/2024    K 4.4 03/10/2023    K 4.5 03/09/2023    K 4.1 03/08/2023       Lab Results   Component Value Date    WBC 19.3 (H) 12/16/2024    RBC 3.89 12/16/2024    HGB 12.2 12/16/2024    HCT 38.0 12/16/2024    MCV 97.7 12/16/2024    MCH 31.4 12/16/2024    RDW 13.4 12/16/2024     12/16/2024       Lab Results   Component Value Date    CALCIUM 9.3 12/16/2024       Urine dipstick:   No results found for: \"COLOR\", \"CASTS\"         Thank you for allowing us to participate in the care of this patient.   We will follow patient. Please don’t hesitate to call with any questions    Demetri Mcguire MD  12/16/2024    [facility] [address] [place] [address]  Phone - [phone]   Fax - [phone]  [email or web address]

## 2024-12-16 NOTE — CARE COORDINATION
Care Management Initial Assessment       RUR:12%  Readmission? No  1st IM letter given? No  1st  letter given: No       12/16/24 1206   Service Assessment   Patient Orientation Unable to Assess   Cognition Dementia / Early Alzheimer's   History Provided By Child/Family   Primary Caregiver   (resident at [facility])   Accompanied By/Relationship Eleanor Taylor [phone]  (Eleanor Taylor [phone])   Support Systems Family Members   Patient's Healthcare Decision Maker is: Legal Next of Kin   PCP Verified by CM Yes   Can patient return to prior living arrangement Unknown at present   Would you like for me to discuss the discharge plan with any other family members/significant others, and if so, who? Yes  (Daughters Dr. Doreen Burciaga and Eleanor Taylor)   Financial Resources Medicare   Services At/After Discharge   Transition of Care Consult (CM Consult) Hospice   Internal Hospice Yes     EMR reviewed. Patient presents from [facility] . Noted Palliative and Hospice consults. Currently patient is a r/o for covid-19 . Call placed to daughter Dr. Doreen Burciaga introduced to role of CM informed she is in the [place] ([place]ives in [place], [place]). Alternative call placed to Eleanor Taylor [phone] introduced to role of CM. CM acknowledged Hospice order and FOC daughter expressed current need to continue Palliative treatment (medications for pain and ABX )this writer informed would continue to receive current care. Eleanor in agreement w/ CARLENE to send referral to [facility] by [facility] to review.     Daughter states patient is a DNR and she will call [place] for copy of DNR. This writer acknowledged can place call and obtain and will provide to ED Unit for scanning.     Call placed to facility spoke w/ Audra and copy received.     CM Department will follow for MARC needs.

## 2024-12-16 NOTE — H&P
uncertain of outcome and options with inability to obtain angiogram for intervention due to MYRANDA  -Patient is on Xarelto at home but will order heparin IV drip due to concerns for lower extremity ischemia as well as decreasing GFR    6.  Elevated troponin  -Suspect demand ischemia  -Cannot rule ACS  -Initial high-sensitivity troponin 1164  -Repeat /trend high-sensitivity troponin levels  -Cardiologist consulted  -Start heparin IV infusion    7.  MYRANDA (acute kidney injury)  -BUN 41, creatinine 1.88, GFR 23  -Repeat renal panel in a.m.  -Consult nephrologist  -Avoid nephrotoxins  -Ordered bedside bladder scan as patient is anuric  -Plan as above regarding IV fluids    8.  Acute on chronic heart failure preserved ejection fraction (HFpEF)  -proBNP 4686  -Last TTE 3/7/2023: EF 60% to 65%, dilated left and right atrium  -Repeat TTE in a.m.  -Strict I's and O's; daily weights  -GDMT limited due to hypotension and MYRANDA  -Consult cardiologist    9.  Leukocytosis, unspecified  -WBC 22.4, neutrophils 78%  -Repeat CBC in a.m.    10.   Hypokalemia  - Initial K 3.2  -Repeat K level and provide KCl replacements if needed    11.  Numbness and tingling of left lower extremity        Lumbar spinal stenosis        Pain of the left lower extremity        Acute lower back pain  -Severe stenosis of L3-L4 and L4-L5 (seen on CT abdomen pelvis)  -MRI lumbar spine without IV contrast ordered but not obtainable as patient is hemodynamically unstable and unable to transport to MRI for scan  -Place on fall precautions and neurochecks    12.  Hypoxia  -Continue supplemental oxygen  -Continuous pulse oximetry monitoring  -Titrate O2 to keep O2 saturations greater than 94%    13.  Generalized weakness  -plan as above    14.   Dementia  -continue neuro checks    15.  Hyperglycemia  -Without history of type 2 diabetes mellitus  -Initial initial random blood glucose 43 mg/dL  -Order insulin correctional sliding scale coverage  -Order scheduled

## 2024-12-16 NOTE — ED NOTES
Per EMS the patient was at the nursing home sitting in a wheelchair when she went to stand up and had sudden leg pain. They denied the patient falling or having any other associated trauma.  Daughter states that she had her rubbing on her buttocks and the tailbone and this seemed to provide her comfort and she stated that she complained of some numbness to the upper part of the leg. The description sounds a little of sciatica. Daughter denied any previous problems with sciatica. Daughter states that the patient had 500 mg tylenol pta.

## 2024-12-16 NOTE — CARDIO/PULMONARY
Cardiac Rehab: Review of chart done on Edison Burciaga for elevated troponin and suspected NSTEMI. The following is cards impression from Dr Torrez:      This is a 89 yof with MMP here with acute ischemia of her LLE and consulted for elevated troponin. Given dearth of cardiac symptoms and advance age and MMP including dementia, agree with conservative medical therapy.     Plan:     Cont heparin drip  Echo pending  Dose of IV digoxin  Cardiac meds held  Agree comfort measures would be appropriate if this is decided   Therefore, at this time will not refer to OP program.Yanely Dale RN

## 2024-12-17 VITALS
DIASTOLIC BLOOD PRESSURE: 79 MMHG | TEMPERATURE: 97.6 F | HEART RATE: 58 BPM | OXYGEN SATURATION: 92 % | SYSTOLIC BLOOD PRESSURE: 107 MMHG | BODY MASS INDEX: 28.91 KG/M2 | WEIGHT: 163.14 LBS | HEIGHT: 63 IN | RESPIRATION RATE: 32 BRPM

## 2024-12-17 LAB
ALBUMIN SERPL-MCNC: 3.9 G/DL (ref 3.5–5)
ALBUMIN/GLOB SERPL: 1.4 (ref 1.1–2.2)
ALP SERPL-CCNC: 52 U/L (ref 45–117)
ALT SERPL-CCNC: 419 U/L (ref 12–78)
ANION GAP SERPL CALC-SCNC: 13 MMOL/L (ref 2–12)
AST SERPL-CCNC: 601 U/L (ref 15–37)
BASOPHILS # BLD: 0 K/UL (ref 0–0.1)
BASOPHILS NFR BLD: 0 % (ref 0–1)
BILIRUB SERPL-MCNC: 0.5 MG/DL (ref 0.2–1)
BUN SERPL-MCNC: 72 MG/DL (ref 6–20)
BUN/CREAT SERPL: 25 (ref 12–20)
CALCIUM SERPL-MCNC: 8.4 MG/DL (ref 8.5–10.1)
CHLORIDE SERPL-SCNC: 107 MMOL/L (ref 97–108)
CO2 SERPL-SCNC: 17 MMOL/L (ref 21–32)
CREAT SERPL-MCNC: 2.9 MG/DL (ref 0.55–1.02)
DIFFERENTIAL METHOD BLD: ABNORMAL
ECHO BSA: 1.72 M2
EOSINOPHIL # BLD: 0 K/UL (ref 0–0.4)
EOSINOPHIL NFR BLD: 0 % (ref 0–7)
ERYTHROCYTE [DISTWIDTH] IN BLOOD BY AUTOMATED COUNT: 13.9 % (ref 11.5–14.5)
EST. AVERAGE GLUCOSE BLD GHB EST-MCNC: 114 MG/DL
GLOBULIN SER CALC-MCNC: 2.8 G/DL (ref 2–4)
GLUCOSE BLD STRIP.AUTO-MCNC: 152 MG/DL (ref 65–117)
GLUCOSE BLD STRIP.AUTO-MCNC: 157 MG/DL (ref 65–117)
GLUCOSE BLD STRIP.AUTO-MCNC: 162 MG/DL (ref 65–117)
GLUCOSE SERPL-MCNC: 153 MG/DL (ref 65–100)
HBA1C MFR BLD: 5.6 % (ref 4–5.6)
HCT VFR BLD AUTO: 35.7 % (ref 35–47)
HGB BLD-MCNC: 11.6 G/DL (ref 11.5–16)
IMM GRANULOCYTES # BLD AUTO: 0.3 K/UL (ref 0–0.04)
IMM GRANULOCYTES NFR BLD AUTO: 1 % (ref 0–0.5)
LACTATE SERPL-SCNC: 2.8 MMOL/L (ref 0.4–2)
LYMPHOCYTES # BLD: 2.4 K/UL (ref 0.8–3.5)
LYMPHOCYTES NFR BLD: 9 % (ref 12–49)
MAGNESIUM SERPL-MCNC: 2.8 MG/DL (ref 1.6–2.4)
MCH RBC QN AUTO: 31.4 PG (ref 26–34)
MCHC RBC AUTO-ENTMCNC: 32.5 G/DL (ref 30–36.5)
MCV RBC AUTO: 96.5 FL (ref 80–99)
MONOCYTES # BLD: 2.2 K/UL (ref 0–1)
MONOCYTES NFR BLD: 8 % (ref 5–13)
NEUTS SEG # BLD: 22.2 K/UL (ref 1.8–8)
NEUTS SEG NFR BLD: 82 % (ref 32–75)
NRBC # BLD: 0 K/UL (ref 0–0.01)
NRBC BLD-RTO: 0 PER 100 WBC
PLATELET # BLD AUTO: 149 K/UL (ref 150–400)
PMV BLD AUTO: 12.2 FL (ref 8.9–12.9)
POTASSIUM SERPL-SCNC: 6.5 MMOL/L (ref 3.5–5.1)
PROT SERPL-MCNC: 6.7 G/DL (ref 6.4–8.2)
RBC # BLD AUTO: 3.7 M/UL (ref 3.8–5.2)
RBC MORPH BLD: ABNORMAL
SERVICE CMNT-IMP: ABNORMAL
SODIUM SERPL-SCNC: 137 MMOL/L (ref 136–145)
UFH PPP CHRO-ACNC: 0.31 IU/ML
UFH PPP CHRO-ACNC: 0.47 IU/ML
VAS LEFT ATA DIST PSV: 0 CM/S
VAS LEFT CFA PROX PSV: 0 CM/S
VAS LEFT PERONEAL DIST PSV: 0 CM/S
VAS LEFT PFA PROX PSV: 0.5 CM/S
VAS LEFT POP A DIST PSV: 0 CM/S
VAS LEFT POP A PROX PSV: 0 CM/S
VAS LEFT PTA DIST PSV: 0 CM/S
VAS LEFT PTA PROX PSV: 0 CM/S
VAS LEFT SFA DIST PSV: 0 CM/S
VAS LEFT SFA DIST VEL RATIO: 0
VAS LEFT SFA MID PSV: 0.2 CM/S
VAS RIGHT ATA DIST PSV: 19.8 CM/S
VAS RIGHT ATA PROX PSV: 39.3 CM/S
VAS RIGHT CFA PROX PSV: 50.8 CM/S
VAS RIGHT PERONEAL DIST PSV: 44.7 CM/S
VAS RIGHT PFA PROX PSV: 56.2 CM/S
VAS RIGHT POP A DIST PSV: 50.8 CM/S
VAS RIGHT POP A PROX PSV: 87.5 CM/S
VAS RIGHT POP A PROX VEL RATIO: 2.97
VAS RIGHT PTA DIST PSV: 0 CM/S
VAS RIGHT PTA PROX PSV: 0 CM/S
VAS RIGHT SFA DIST PSV: 29.5 CM/S
VAS RIGHT SFA DIST VEL RATIO: 0.65
VAS RIGHT SFA MID PSV: 45.7 CM/S
VAS RIGHT SFA MID VEL RATIO: 0.8
VAS RIGHT SFA PROX PSV: 57.4 CM/S
VAS RIGHT SFA PROX VEL RATIO: 1.1
WBC # BLD AUTO: 27.1 K/UL (ref 3.6–11)

## 2024-12-17 PROCEDURE — 2500000003 HC RX 250 WO HCPCS: Performed by: FAMILY MEDICINE

## 2024-12-17 PROCEDURE — 6360000002 HC RX W HCPCS: Performed by: NURSE PRACTITIONER

## 2024-12-17 PROCEDURE — 36415 COLL VENOUS BLD VENIPUNCTURE: CPT

## 2024-12-17 PROCEDURE — 6360000002 HC RX W HCPCS: Performed by: HOSPITALIST

## 2024-12-17 PROCEDURE — 85025 COMPLETE CBC W/AUTO DIFF WBC: CPT

## 2024-12-17 PROCEDURE — 94760 N-INVAS EAR/PLS OXIMETRY 1: CPT

## 2024-12-17 PROCEDURE — 82962 GLUCOSE BLOOD TEST: CPT

## 2024-12-17 PROCEDURE — 83036 HEMOGLOBIN GLYCOSYLATED A1C: CPT

## 2024-12-17 PROCEDURE — 2700000000 HC OXYGEN THERAPY PER DAY

## 2024-12-17 PROCEDURE — 83605 ASSAY OF LACTIC ACID: CPT

## 2024-12-17 PROCEDURE — 99232 SBSQ HOSP IP/OBS MODERATE 35: CPT | Performed by: PHYSICAL MEDICINE & REHABILITATION

## 2024-12-17 PROCEDURE — 85520 HEPARIN ASSAY: CPT

## 2024-12-17 PROCEDURE — 2580000003 HC RX 258: Performed by: FAMILY MEDICINE

## 2024-12-17 PROCEDURE — 83735 ASSAY OF MAGNESIUM: CPT

## 2024-12-17 PROCEDURE — 80053 COMPREHEN METABOLIC PANEL: CPT

## 2024-12-17 PROCEDURE — 6360000002 HC RX W HCPCS: Performed by: PHYSICAL MEDICINE & REHABILITATION

## 2024-12-17 RX ORDER — GLYCOPYRROLATE 0.2 MG/ML
0.1 INJECTION INTRAMUSCULAR; INTRAVENOUS EVERY 6 HOURS PRN
Status: DISCONTINUED | OUTPATIENT
Start: 2024-12-17 | End: 2024-12-17 | Stop reason: HOSPADM

## 2024-12-17 RX ORDER — LORAZEPAM 2 MG/ML
0.5 INJECTION INTRAMUSCULAR
Status: DISCONTINUED | OUTPATIENT
Start: 2024-12-17 | End: 2024-12-17 | Stop reason: HOSPADM

## 2024-12-17 RX ORDER — HYDROMORPHONE HYDROCHLORIDE 1 MG/ML
1 INJECTION, SOLUTION INTRAMUSCULAR; INTRAVENOUS; SUBCUTANEOUS
Status: DISCONTINUED | OUTPATIENT
Start: 2024-12-17 | End: 2024-12-17 | Stop reason: HOSPADM

## 2024-12-17 RX ADMIN — FENTANYL CITRATE 20 MCG: 50 INJECTION INTRAMUSCULAR; INTRAVENOUS at 02:23

## 2024-12-17 RX ADMIN — LORAZEPAM 0.5 MG: 2 INJECTION INTRAMUSCULAR; INTRAVENOUS at 10:44

## 2024-12-17 RX ADMIN — LORAZEPAM 0.5 MG: 2 INJECTION INTRAMUSCULAR; INTRAVENOUS at 12:30

## 2024-12-17 RX ADMIN — FENTANYL CITRATE 20 MCG: 50 INJECTION INTRAMUSCULAR; INTRAVENOUS at 06:35

## 2024-12-17 RX ADMIN — DOXYCYCLINE 100 MG: 100 INJECTION, POWDER, LYOPHILIZED, FOR SOLUTION INTRAVENOUS at 06:05

## 2024-12-17 RX ADMIN — GLYCOPYRROLATE 0.1 MG: 0.2 INJECTION INTRAMUSCULAR; INTRAVENOUS at 10:22

## 2024-12-17 RX ADMIN — GLYCOPYRROLATE 0.1 MG: 0.2 INJECTION INTRAMUSCULAR; INTRAVENOUS at 02:20

## 2024-12-17 RX ADMIN — FENTANYL CITRATE 20 MCG: 50 INJECTION INTRAMUSCULAR; INTRAVENOUS at 11:36

## 2024-12-17 RX ADMIN — SODIUM CHLORIDE, PRESERVATIVE FREE 10 ML: 5 INJECTION INTRAVENOUS at 09:10

## 2024-12-17 RX ADMIN — FENTANYL CITRATE 20 MCG: 50 INJECTION INTRAMUSCULAR; INTRAVENOUS at 09:10

## 2024-12-17 RX ADMIN — FENTANYL CITRATE 20 MCG: 50 INJECTION INTRAMUSCULAR; INTRAVENOUS at 04:42

## 2024-12-17 RX ADMIN — FENTANYL CITRATE 20 MCG: 50 INJECTION INTRAMUSCULAR; INTRAVENOUS at 00:22

## 2024-12-17 ASSESSMENT — PAIN SCALES - GENERAL
PAINLEVEL_OUTOF10: 7
PAINLEVEL_OUTOF10: 0
PAINLEVEL_OUTOF10: 0
PAINLEVEL_OUTOF10: 7
PAINLEVEL_OUTOF10: 10
PAINLEVEL_OUTOF10: 0
PAINLEVEL_OUTOF10: 7
PAINLEVEL_OUTOF10: 8

## 2024-12-17 ASSESSMENT — PAIN DESCRIPTION - DESCRIPTORS
DESCRIPTORS: ACHING

## 2024-12-17 ASSESSMENT — PAIN DESCRIPTION - LOCATION
LOCATION: BACK;GENERALIZED
LOCATION: GENERALIZED
LOCATION: BACK
LOCATION: GENERALIZED

## 2024-12-17 ASSESSMENT — PAIN DESCRIPTION - ORIENTATION
ORIENTATION: LEFT
ORIENTATION: LEFT

## 2024-12-17 NOTE — PROGRESS NOTES
1400: Family requested for nurse to come in room as they suspected passing. Wes, RN went in to listen to heart and feel pulse. Wes notified this RN that patient had passed.    1405: This RN called aJsmine Samaniego, hospice nurse, as patient was being transitioned to hospice care. She confirmed time of death at 1414. Family in room with patient.     1600: Family departed. Postmortem care was performed.    1621: Patient was brought to [facility].   
Patient comfort care  Will sign off  Call us back with any issues  
Per Dr. Nur (ordering provider) spoken to in ED,  lower extremity arterial duplex can wait until Monday when vascular ultrasound department is open.   
Spiritual Health History and Assessment/Progress Note  [facility]    Family Care, Family Care (notified of death), Death,      Name: Edison Burciaga MRN: [id number]    Age: 89 y.o.     Sex: female   Language: English   Mandaeism: [employer]   Atrial fibrillation with rapid ventricular response (HCC)     Date: 2024            Total Time Calculated: 12 min              Spiritual Assessment began in [facility] 6E MED ONCOLOGY        Referral/Consult From: Nurse   Encounter Overview/Reason: Family Care  Service Provided For: Family    Kirsten, Belief, Meaning:   Patient unable to assess at this time  Family/Friends Other: family declined  services      Importance and Influence:  Patient unable to assess at this time  Family/Friends No family/friends present    Community:  Patient Other: unable to assess  Family/Friends No family/friends present    Assessment and Plan of Care:     Patient Interventions include:   Family/Friends Interventions include: No family/friends present    Patient Plan of Care: No spiritual needs identified for follow-up  Family/Friends Plan of Care: No family/friends present    Other:  paged to room patient has  with family present.  was advised by nurse that family declined  services and left.     Electronically signed by Yvonne Hankins, Chaplain Resident on 2024 at 2:53 PM   
Spiritual Health History and Assessment/Progress Note  [facility]    Palliative Care,  , Anticipatory Grief,      Name: Edison Burciaga MRN: [id number]    Age: 89 y.o.     Sex: female   Language: English   Ad[employer]: [employer]   Atrial fibrillation with rapid ventricular response (HCC)     Date: 12/16/2024            Total Time Calculated: 20 min              Spiritual Assessment began in [facility] EMERGENCY DEP        Referral/Consult From: Palliative Care   Encounter Overview/Reason: Palliative Care  Service Provided For: Patient and family together    Kirsten, Belief, Meaning:   Patient identifies as spiritual  Family/Friends identify as spiritual      Importance and Influence:  Patient has no beliefs influential to healthcare decision-making identified during this visit  Family/Friends have no beliefs influential to healthcare decision-making identified during this visit    Community:  Patient feels well-supported. Support system includes: Children and Extended family  Family/Friends feel well-supported. Support system includes: Extended family    Assessment and Plan of Care:     Patient Interventions include: Other:    Family/Friends Interventions include: Facilitated expression of thoughts and feelings and Affirmed coping skills/support systems    Patient Plan of Care: Spiritual Care available upon further referral  Family/Friends Plan of Care: Spiritual Care available upon further referral    I visited Edison Burciaga for a palliative initial spiritual health assessment.    The patient was awake with two of her many grand children at bedside. Her four surviving children (originally there were five) are expected to be here later today. The patient was awake but unable to interact verbally.                     Electronically signed by JUAN TORRES on 12/16/2024 at 3:06 PM   
[facility]  Good Help to Those in Need  [phone]    Patient Name: Edison Burciaga  YOB: 1923  Age: 89 y.o.    [facility] RN Note:  Hospice consult noted. Chart reviewed. Plan of care discussed with patients nurse & care manager.   In to meet with all 4 children.   Discussed Hospice philosophy, general plan of care, levels of care, services and on call procedures.  They agreed with hospice. Called for review for [facility] level of care. Spoke with Dr Arriola and got CTI of PAD.   Met with family to sign consents.  Waiting on approval.     Upon returning to floor , patient had just passed. No admitted to GIP level of care. Notified Dr Epps of death.    Thank you for the opportunity to be of service to this patient.   Jasmine Samaniego RN  Hospice Liaison  [phone] c  [phone] o    
[facility]  Good Help to Those in Need  [phone]     Patient Name: Edison Burciaga  YOB: 1923  Age: 89 y.o.    [facility] RN Note:  Hospice consult received, reviewing chart. Will follow up with Unit Nurse and Care Manager to discuss plan of care, patient status and discharge disposition within the hour.   Family coming in from out of state tonight. I spoke with patient's Eleanor and lots of family coming from out of state tonight. Plan will be for hospice liaison to meet with them bedside tomorrow at 11am.  She will be comfort care [facility] and I will place her on wait list for [facility].     Thank you for the opportunity to be of service to this patient.     Hilary Sandoval RN  Clinical Nurse Liaison  [facility]   [phone] Mobile  [phone] Office   Available on Perfect Serve     
[facility]      Good Help to Those in Need  [phone]     Patient Name: Edison Burciaga  YOB: 1923  Age: 89 y.o.    [facility] RN Note:  At bedside to see patient who has . Patient is unresponsive. Absent of breath sounds. Pupils fixed and dilated. Apical pulse absent after auscultating for 60sec  TOD:1414  Pronounced on behalf of  Chloe Epps MD   Witnessed by Lea Barboza  Thank you for the opportunity to be of service to this patient.     Jasmine Samaniego RN  Hospice Liaison  [phone] c  [phone] o    
The Rapid Response RN was requested to assist with peripheral vascular access in room ED 13    On exam the patient is awake and alert with some repetitive questioning and c/f acute confusion, with increasing lactic acidosis.  The Primary RN advises that they have had difficulty obtaining reliable IV access.      Due to patient and anatomic considerations no suitable location identified utilizing palpation or vein finder.  The patient was assessed via ultrasound found to have suitable, patent cephalic vein in the left upper arm.  The needle tip was observed continuously throughout the placement, and the guidewire was confirmed to be in the vessel prior to catheter advancement using an accelerated Seldinger technique.  Vein was accessed using a 20Gx2.25in AccuCath IV with brisk venous blood return.  The catheter was flushed under ultrasound without signs of extravasation or infiltration.  Sample obtained and provided to Primary RN for analysis.      Report given to the Primary RN, and the patient was left in their care.  Opportunity provided for questions or concerns.          
Fentanyl added yesterday, may need to schedule.  Plan:   Hospice to meet and decide where Hospice care will take place.   For now cont current measures.     Discussed w/ Jasmine Samaniego w/ Hospice    Referrals to:   [] Outpatient Palliative Care  [] Home Based Palliative Care  [] Home Based Primary Care  [x] Hospice       ADVANCE CARE PLANNING:   [] The [employer] Interdisciplinary Team has updated the ACP Navigator with Health Care Decision Maker and Patient Capacity      Primary Decision Maker: Doreen Burciaga - Child - [phone]  Confirm Advance Directive: None    Current Code Status: DNR     Goals of Care: Goals of Care and Interventions  Patient/Health Care Proxy Stated Goals: Comfort       Please refer to Palliative Medicine ACP notes for further details.    PALLIATIVE ASSESSMENT:      Palliative Performance Scale (PPS):  PPS: 30    Pt c/o pain earlier per sons, received Fentanyl, resting.    Modified ESAS:  Modified-Varney Symptom Assessment Scale (ESAS)  Tiredness Score: Not tired  Drowsiness Score: Not drowsy  Pain Score: 3  Anxiety Score: 2  Dyspnea Score: 2    Clinical Pain Assessment (nonverbal scale for severity on nonverbal patients):   Clinical Pain Assessment  Severity: 3  Location: Leg and back  Character: cannot assess  Duration: days  Factors: cannot assess  Frequency: cannot assess           Vital Signs: Blood pressure 107/79, pulse 98, temperature 97.6 °F (36.4 °C), temperature source Axillary, resp. rate 28, height 1.6 m (5' 3\"), weight 74 kg (163 lb 2.3 oz), SpO2 92%.    PHYSICAL ASSESSMENT:   General: [] Oriented x3  [] Well appearing  [] Intubated  []Ill appearing  [x]Other:alert, smiles, confused   Mental Status: [] Normal mental status exam  [] Drowsy  [x] Confused  []Other:  Cardiovascular: [] Regular rate/rhythm  [] Arrhythmia  [] Other:  Chest: [x] Effort normal  []Lungs clear  [] Respiratory distress  []Tachypnea  [] Other:  Abdomen: [] Soft/non-tender  [] Normal appearance  [] 
0                 Chloe Epps MD

## 2024-12-17 NOTE — PLAN OF CARE
Problem: Safety - Adult  Goal: Free from fall injury  Outcome: Completed     Problem: Chronic Conditions and Co-morbidities  Goal: Patient's chronic conditions and co-morbidity symptoms are monitored and maintained or improved  Outcome: Completed     Problem: Pain  Goal: Verbalizes/displays adequate comfort level or baseline comfort level  Outcome: Completed

## 2024-12-17 NOTE — PLAN OF CARE
Patient and family focusing on comfort care  Problem: Chronic Conditions and Co-morbidities  Goal: Patient's chronic conditions and co-morbidity symptoms are monitored and maintained or improved  Outcome: Not Progressing     Problem: Safety - Adult  Goal: Free from fall injury  Outcome: Progressing     Problem: Pain  Goal: Verbalizes/displays adequate comfort level or baseline comfort level  Outcome: Progressing

## 2024-12-17 NOTE — DISCHARGE SUMMARY
improving with 4 L/min nasal cannula, continue oxygen support, monitor pulse ox  -respiratory panel non reactive  UTI  -continue iv ceftriaxone   -urine culture gram negative rods, identification  Paroxysmal A Fib with RVR , HR fluctuating  -s/p iv metoprolol 5 mg x 1 in ER  -continue oral metoprolol, on heparin gtt  -her home xarelto on hold  -Cardiologist on board  Lower back pain possible due to L3-4, L4-5 spinal canal stenosis   Pain and numbness and tingling sensation of left lower extremities   -continue prn tylenol  -PT/OT   -d/c MRI of lumbar spine    Ischemia of both extremities more on the left due to PAD   -off heparin drip   -arterial doppler study pending  -Vascular surgeon on board  Elevated troponin possibly due to demand ischemia  -troponin high sensitivity initial was 1,163, repeated 2,952  -EKG A-fib ventricular rate was 97 bpm, nonspecific ST-T wave  -s/p metoprolol, heparin gtt  -cardiologist is consulted  MYRANDA likely due to prerenal and sepsis  -Creatinine is trending up, creatinine 2.01  -IV albumin x 2 doses  -Avoid nephrotoxin  -Repeat renal function test in a.m.  -Nephrology on board   HFpEF  -s/p metoprolol  -Unable to give diuretics, ACEi/ARB due to MYRANDA and  -Continue I/O and daily weight monitoring  -Echo pending  HTN  -BP normal, monitor BP  Hypokalemia   -resolved  -repeat in am  Generalized weakness  -due to above  -PT/OT   Acute metabolic encephalopathy with hx of dementia   -patient lethargic  -CT head no acute process  -continue neuro check and supportive care     DISCHARGE / DEATH DIAGNOSES       Sepsis due to pneumonia and UTI  Ischemia of both extremities more on the left due to occlusion of all visualized vessels LLE.   Paroxysmal A Fib with RVR     DISCHARGE MEDICATIONS:     Medication List        ASK your doctor about these medications      ascorbic acid 500 MG tablet  Commonly known as: VITAMIN C     coenzyme Q10 100 MG Caps capsule     Fiber 625 MG Tabs     rivaroxaban 15 MG

## 2024-12-18 LAB
BACTERIA SPEC CULT: ABNORMAL
CC UR VC: ABNORMAL
SERVICE CMNT-IMP: ABNORMAL

## 2024-12-20 LAB
BACTERIA SPEC CULT: NORMAL
BACTERIA SPEC CULT: NORMAL
SERVICE CMNT-IMP: NORMAL
SERVICE CMNT-IMP: NORMAL